# Patient Record
Sex: MALE | Race: ASIAN | NOT HISPANIC OR LATINO | Employment: UNEMPLOYED
[De-identification: names, ages, dates, MRNs, and addresses within clinical notes are randomized per-mention and may not be internally consistent; named-entity substitution may affect disease eponyms.]

---

## 2017-08-29 ENCOUNTER — RECORDS - HEALTHEAST (OUTPATIENT)
Dept: ADMINISTRATIVE | Facility: OTHER | Age: 37
End: 2017-08-29

## 2017-09-06 ENCOUNTER — RECORDS - HEALTHEAST (OUTPATIENT)
Dept: ADMINISTRATIVE | Facility: OTHER | Age: 37
End: 2017-09-06

## 2018-03-05 ENCOUNTER — OFFICE VISIT - HEALTHEAST (OUTPATIENT)
Dept: FAMILY MEDICINE | Facility: CLINIC | Age: 38
End: 2018-03-05

## 2018-03-05 DIAGNOSIS — N52.9 ERECTILE DYSFUNCTION: ICD-10-CM

## 2018-03-05 DIAGNOSIS — N41.9 PROSTATITIS: ICD-10-CM

## 2018-03-05 DIAGNOSIS — E66.9 OBESITY: ICD-10-CM

## 2018-03-05 DIAGNOSIS — L83 ACANTHOSIS NIGRICANS: ICD-10-CM

## 2018-03-05 DIAGNOSIS — L21.0 SEBORRHEA CAPITIS: ICD-10-CM

## 2018-03-05 DIAGNOSIS — G62.9 NEUROPATHY: ICD-10-CM

## 2018-03-05 DIAGNOSIS — R74.8 ABNORMAL LIVER ENZYMES: ICD-10-CM

## 2018-03-05 LAB
ALBUMIN SERPL-MCNC: 4.5 G/DL (ref 3.5–5)
ALP SERPL-CCNC: 79 U/L (ref 45–120)
ALT SERPL W P-5'-P-CCNC: 100 U/L (ref 0–45)
ANION GAP SERPL CALCULATED.3IONS-SCNC: 12 MMOL/L (ref 5–18)
AST SERPL W P-5'-P-CCNC: 67 U/L (ref 0–40)
BILIRUB SERPL-MCNC: 0.8 MG/DL (ref 0–1)
BUN SERPL-MCNC: 15 MG/DL (ref 8–22)
CALCIUM SERPL-MCNC: 9.9 MG/DL (ref 8.5–10.5)
CHLORIDE BLD-SCNC: 103 MMOL/L (ref 98–107)
CO2 SERPL-SCNC: 26 MMOL/L (ref 22–31)
CREAT SERPL-MCNC: 0.93 MG/DL (ref 0.7–1.3)
ERYTHROCYTE [DISTWIDTH] IN BLOOD BY AUTOMATED COUNT: 11.8 % (ref 11–14.5)
GFR SERPL CREATININE-BSD FRML MDRD: >60 ML/MIN/1.73M2
GLUCOSE BLD-MCNC: 71 MG/DL (ref 70–125)
HCT VFR BLD AUTO: 52.5 % (ref 40–54)
HGB BLD-MCNC: 17 G/DL (ref 14–18)
LDLC SERPL CALC-MCNC: 196 MG/DL
MCH RBC QN AUTO: 28.3 PG (ref 27–34)
MCHC RBC AUTO-ENTMCNC: 32.4 G/DL (ref 32–36)
MCV RBC AUTO: 88 FL (ref 80–100)
PLATELET # BLD AUTO: 314 THOU/UL (ref 140–440)
PMV BLD AUTO: 7 FL (ref 7–10)
POTASSIUM BLD-SCNC: 4.4 MMOL/L (ref 3.5–5)
PROT SERPL-MCNC: 8.5 G/DL (ref 6–8)
RBC # BLD AUTO: 5.99 MILL/UL (ref 4.4–6.2)
SODIUM SERPL-SCNC: 141 MMOL/L (ref 136–145)
TSH SERPL DL<=0.005 MIU/L-ACNC: 3.11 UIU/ML (ref 0.3–5)
WBC: 9.9 THOU/UL (ref 4–11)

## 2018-03-05 ASSESSMENT — MIFFLIN-ST. JEOR: SCORE: 1724.59

## 2018-03-07 ENCOUNTER — COMMUNICATION - HEALTHEAST (OUTPATIENT)
Dept: FAMILY MEDICINE | Facility: CLINIC | Age: 38
End: 2018-03-07

## 2018-03-07 LAB
HAV IGM SERPL QL IA: NEGATIVE
HBV CORE IGM SERPL QL IA: NEGATIVE
HBV SURFACE AG SERPL QL IA: NEGATIVE
HCV AB SERPL QL IA: NEGATIVE

## 2018-05-11 ENCOUNTER — OFFICE VISIT - HEALTHEAST (OUTPATIENT)
Dept: FAMILY MEDICINE | Facility: CLINIC | Age: 38
End: 2018-05-11

## 2018-05-11 DIAGNOSIS — L21.0 SEBORRHEA CAPITIS: ICD-10-CM

## 2018-05-11 DIAGNOSIS — E78.5 HYPERLIPIDEMIA: ICD-10-CM

## 2018-05-11 DIAGNOSIS — M77.9 TENDONITIS: ICD-10-CM

## 2018-05-11 DIAGNOSIS — E66.9 OBESITY: ICD-10-CM

## 2018-05-11 DIAGNOSIS — R74.8 ABNORMAL LIVER ENZYMES: ICD-10-CM

## 2018-05-11 LAB
ALBUMIN SERPL-MCNC: 4.1 G/DL (ref 3.5–5)
ALP SERPL-CCNC: 85 U/L (ref 45–120)
ALT SERPL W P-5'-P-CCNC: 86 U/L (ref 0–45)
ANION GAP SERPL CALCULATED.3IONS-SCNC: 9 MMOL/L (ref 5–18)
AST SERPL W P-5'-P-CCNC: 50 U/L (ref 0–40)
BILIRUB SERPL-MCNC: 0.7 MG/DL (ref 0–1)
BUN SERPL-MCNC: 17 MG/DL (ref 8–22)
CALCIUM SERPL-MCNC: 9.8 MG/DL (ref 8.5–10.5)
CHLORIDE BLD-SCNC: 105 MMOL/L (ref 98–107)
CO2 SERPL-SCNC: 25 MMOL/L (ref 22–31)
CREAT SERPL-MCNC: 0.86 MG/DL (ref 0.7–1.3)
GFR SERPL CREATININE-BSD FRML MDRD: >60 ML/MIN/1.73M2
GLUCOSE BLD-MCNC: 72 MG/DL (ref 70–125)
LDLC SERPL CALC-MCNC: 170 MG/DL
POTASSIUM BLD-SCNC: 4.4 MMOL/L (ref 3.5–5)
PROT SERPL-MCNC: 7.7 G/DL (ref 6–8)
SODIUM SERPL-SCNC: 139 MMOL/L (ref 136–145)

## 2018-05-14 ENCOUNTER — COMMUNICATION - HEALTHEAST (OUTPATIENT)
Dept: FAMILY MEDICINE | Facility: CLINIC | Age: 38
End: 2018-05-14

## 2018-06-04 ENCOUNTER — OFFICE VISIT - HEALTHEAST (OUTPATIENT)
Dept: FAMILY MEDICINE | Facility: CLINIC | Age: 38
End: 2018-06-04

## 2018-06-04 DIAGNOSIS — J02.0 STREP PHARYNGITIS: ICD-10-CM

## 2018-06-04 DIAGNOSIS — L21.0 SEBORRHEA CAPITIS: ICD-10-CM

## 2018-06-04 DIAGNOSIS — Z20.818 EXPOSURE TO STREP THROAT: ICD-10-CM

## 2018-06-04 LAB — DEPRECATED S PYO AG THROAT QL EIA: ABNORMAL

## 2018-07-08 ENCOUNTER — OFFICE VISIT - HEALTHEAST (OUTPATIENT)
Dept: FAMILY MEDICINE | Facility: CLINIC | Age: 38
End: 2018-07-08

## 2018-07-08 DIAGNOSIS — S89.92XA INJURY, LOWER LEG, LEFT, INITIAL ENCOUNTER: ICD-10-CM

## 2018-07-08 DIAGNOSIS — M79.605 BILATERAL LEG PAIN: ICD-10-CM

## 2018-07-08 DIAGNOSIS — S89.91XA INJURY, LOWER LEG, RIGHT, INITIAL ENCOUNTER: ICD-10-CM

## 2018-07-08 DIAGNOSIS — M79.604 BILATERAL LEG PAIN: ICD-10-CM

## 2018-07-08 DIAGNOSIS — S86.899A SHIN SPLINTS, INITIAL ENCOUNTER: ICD-10-CM

## 2018-09-30 ENCOUNTER — OFFICE VISIT - HEALTHEAST (OUTPATIENT)
Dept: FAMILY MEDICINE | Facility: CLINIC | Age: 38
End: 2018-09-30

## 2018-09-30 DIAGNOSIS — J02.0 ACUTE STREPTOCOCCAL PHARYNGITIS: ICD-10-CM

## 2018-09-30 DIAGNOSIS — R07.0 THROAT PAIN: ICD-10-CM

## 2018-09-30 DIAGNOSIS — J32.9 SINUSITIS: ICD-10-CM

## 2018-09-30 DIAGNOSIS — H66.93 BILATERAL OTITIS MEDIA: ICD-10-CM

## 2018-09-30 LAB — DEPRECATED S PYO AG THROAT QL EIA: ABNORMAL

## 2019-10-24 ENCOUNTER — AMBULATORY - HEALTHEAST (OUTPATIENT)
Dept: NURSING | Facility: CLINIC | Age: 39
End: 2019-10-24

## 2019-10-31 ENCOUNTER — COMMUNICATION - HEALTHEAST (OUTPATIENT)
Dept: PHARMACY | Facility: HOSPITAL | Age: 39
End: 2019-10-31

## 2019-10-31 ENCOUNTER — OFFICE VISIT - HEALTHEAST (OUTPATIENT)
Dept: FAMILY MEDICINE | Facility: CLINIC | Age: 39
End: 2019-10-31

## 2019-10-31 DIAGNOSIS — R10.13 ABDOMINAL PAIN, EPIGASTRIC: ICD-10-CM

## 2019-10-31 DIAGNOSIS — R10.11 RUQ ABDOMINAL PAIN: ICD-10-CM

## 2019-10-31 DIAGNOSIS — K76.0 HEPATIC STEATOSIS: ICD-10-CM

## 2020-10-03 ENCOUNTER — OFFICE VISIT - HEALTHEAST (OUTPATIENT)
Dept: FAMILY MEDICINE | Facility: CLINIC | Age: 40
End: 2020-10-03

## 2020-10-03 DIAGNOSIS — R10.13 ABDOMINAL PAIN, EPIGASTRIC: ICD-10-CM

## 2020-10-03 DIAGNOSIS — Z13.1 SCREENING FOR DIABETES MELLITUS: ICD-10-CM

## 2020-10-03 DIAGNOSIS — K59.00 CONSTIPATION, UNSPECIFIED CONSTIPATION TYPE: ICD-10-CM

## 2020-10-03 DIAGNOSIS — Z13.220 LIPID SCREENING: ICD-10-CM

## 2020-10-03 LAB
CHOLEST SERPL-MCNC: 262 MG/DL
FASTING STATUS PATIENT QL REPORTED: NO
HBA1C MFR BLD: 7.3 %
HDLC SERPL-MCNC: 41 MG/DL
LDLC SERPL CALC-MCNC: 196 MG/DL
TRIGL SERPL-MCNC: 127 MG/DL

## 2020-10-04 ENCOUNTER — COMMUNICATION - HEALTHEAST (OUTPATIENT)
Dept: FAMILY MEDICINE | Facility: CLINIC | Age: 40
End: 2020-10-04

## 2020-10-05 ENCOUNTER — COMMUNICATION - HEALTHEAST (OUTPATIENT)
Dept: FAMILY MEDICINE | Facility: CLINIC | Age: 40
End: 2020-10-05

## 2020-10-07 ENCOUNTER — OFFICE VISIT - HEALTHEAST (OUTPATIENT)
Dept: FAMILY MEDICINE | Facility: CLINIC | Age: 40
End: 2020-10-07

## 2020-10-07 ENCOUNTER — AMBULATORY - HEALTHEAST (OUTPATIENT)
Dept: LAB | Facility: CLINIC | Age: 40
End: 2020-10-07

## 2020-10-07 DIAGNOSIS — R11.0 NAUSEA: ICD-10-CM

## 2020-10-07 DIAGNOSIS — R10.31 ABDOMINAL PAIN, RIGHT LOWER QUADRANT: ICD-10-CM

## 2020-10-07 DIAGNOSIS — R10.13 ABDOMINAL PAIN, EPIGASTRIC: ICD-10-CM

## 2020-10-07 DIAGNOSIS — A04.8 H. PYLORI INFECTION: ICD-10-CM

## 2020-10-07 DIAGNOSIS — R10.32 ABDOMINAL PAIN, LEFT LOWER QUADRANT: ICD-10-CM

## 2020-10-07 LAB
ALBUMIN SERPL-MCNC: 4.9 G/DL (ref 3.5–5)
ALP SERPL-CCNC: 73 U/L (ref 45–120)
ALT SERPL W P-5'-P-CCNC: 124 U/L (ref 0–45)
AST SERPL W P-5'-P-CCNC: 65 U/L (ref 0–40)
BASOPHILS # BLD AUTO: 0 THOU/UL (ref 0–0.2)
BASOPHILS NFR BLD AUTO: 0 % (ref 0–2)
BILIRUB DIRECT SERPL-MCNC: 0.3 MG/DL
BILIRUB SERPL-MCNC: 1.1 MG/DL (ref 0–1)
EOSINOPHIL # BLD AUTO: 0 THOU/UL (ref 0–0.4)
EOSINOPHIL NFR BLD AUTO: 0 % (ref 0–6)
ERYTHROCYTE [DISTWIDTH] IN BLOOD BY AUTOMATED COUNT: 13.2 % (ref 11–14.5)
HCT VFR BLD AUTO: 50 % (ref 40–54)
HGB BLD-MCNC: 16.3 G/DL (ref 14–18)
IMM GRANULOCYTES # BLD: 0 THOU/UL
IMM GRANULOCYTES NFR BLD: 0 %
LIPASE SERPL-CCNC: 20 U/L (ref 0–52)
LYMPHOCYTES # BLD AUTO: 4.7 THOU/UL (ref 0.8–4.4)
LYMPHOCYTES NFR BLD AUTO: 48 % (ref 20–40)
MCH RBC QN AUTO: 28.2 PG (ref 27–34)
MCHC RBC AUTO-ENTMCNC: 32.6 G/DL (ref 32–36)
MCV RBC AUTO: 86 FL (ref 80–100)
MONOCYTES # BLD AUTO: 0.5 THOU/UL (ref 0–0.9)
MONOCYTES NFR BLD AUTO: 5 % (ref 2–10)
NEUTROPHILS # BLD AUTO: 4.5 THOU/UL (ref 2–7.7)
NEUTROPHILS NFR BLD AUTO: 46 % (ref 50–70)
PLATELET # BLD AUTO: 352 THOU/UL (ref 140–440)
PMV BLD AUTO: 9.6 FL (ref 8.5–12.5)
PROT SERPL-MCNC: 8.8 G/DL (ref 6–8)
RBC # BLD AUTO: 5.79 MILL/UL (ref 4.4–6.2)
WBC: 9.8 THOU/UL (ref 4–11)

## 2020-10-08 ENCOUNTER — OFFICE VISIT - HEALTHEAST (OUTPATIENT)
Dept: FAMILY MEDICINE | Facility: CLINIC | Age: 40
End: 2020-10-08

## 2020-10-08 DIAGNOSIS — E66.01 SEVERE OBESITY (BMI 35.0-35.9 WITH COMORBIDITY) (H): ICD-10-CM

## 2020-10-08 DIAGNOSIS — E78.5 HYPERLIPIDEMIA LDL GOAL <130: ICD-10-CM

## 2020-10-08 DIAGNOSIS — R10.30 LOWER ABDOMINAL PAIN: ICD-10-CM

## 2020-10-08 DIAGNOSIS — R07.89 ATYPICAL CHEST PAIN: ICD-10-CM

## 2020-10-08 DIAGNOSIS — R19.8 TENESMUS: ICD-10-CM

## 2020-10-08 DIAGNOSIS — E11.65 TYPE 2 DIABETES MELLITUS WITH HYPERGLYCEMIA, WITHOUT LONG-TERM CURRENT USE OF INSULIN (H): ICD-10-CM

## 2020-10-08 DIAGNOSIS — R74.01 ELEVATED ALT MEASUREMENT: ICD-10-CM

## 2020-10-08 DIAGNOSIS — R03.0 ELEVATED BLOOD PRESSURE READING WITHOUT DIAGNOSIS OF HYPERTENSION: ICD-10-CM

## 2020-10-08 DIAGNOSIS — K76.0 NONALCOHOLIC HEPATOSTEATOSIS: ICD-10-CM

## 2020-10-08 RX ORDER — GLUCOSAMINE HCL/CHONDROITIN SU 500-400 MG
1 CAPSULE ORAL DAILY
Qty: 100 STRIP | Refills: 3 | Status: SHIPPED | OUTPATIENT
Start: 2020-10-08 | End: 2023-05-01

## 2020-10-08 ASSESSMENT — PATIENT HEALTH QUESTIONNAIRE - PHQ9: SUM OF ALL RESPONSES TO PHQ QUESTIONS 1-9: 0

## 2020-10-09 ENCOUNTER — COMMUNICATION - HEALTHEAST (OUTPATIENT)
Dept: FAMILY MEDICINE | Facility: CLINIC | Age: 40
End: 2020-10-09

## 2020-10-09 DIAGNOSIS — A04.8 HELICOBACTER PYLORI STOOL TEST POSITIVE: ICD-10-CM

## 2020-10-09 LAB — H PYLORI AG STL QL IA: POSITIVE

## 2020-10-11 ENCOUNTER — AMBULATORY - HEALTHEAST (OUTPATIENT)
Dept: FAMILY MEDICINE | Facility: CLINIC | Age: 40
End: 2020-10-11

## 2020-10-11 DIAGNOSIS — R07.89 ATYPICAL CHEST PAIN: ICD-10-CM

## 2020-10-11 DIAGNOSIS — R10.30 LOWER ABDOMINAL PAIN: ICD-10-CM

## 2020-10-13 ENCOUNTER — COMMUNICATION - HEALTHEAST (OUTPATIENT)
Dept: SCHEDULING | Facility: CLINIC | Age: 40
End: 2020-10-13

## 2020-10-14 ENCOUNTER — COMMUNICATION - HEALTHEAST (OUTPATIENT)
Dept: FAMILY MEDICINE | Facility: CLINIC | Age: 40
End: 2020-10-14

## 2020-10-14 ENCOUNTER — COMMUNICATION - HEALTHEAST (OUTPATIENT)
Dept: CARDIOLOGY | Facility: CLINIC | Age: 40
End: 2020-10-14

## 2020-10-14 ENCOUNTER — OFFICE VISIT - HEALTHEAST (OUTPATIENT)
Dept: FAMILY MEDICINE | Facility: CLINIC | Age: 40
End: 2020-10-14

## 2020-10-14 DIAGNOSIS — R07.9 ACUTE CHEST PAIN: ICD-10-CM

## 2020-10-14 LAB
ATRIAL RATE - MUSE: 78 BPM
DIASTOLIC BLOOD PRESSURE - MUSE: NORMAL
INTERPRETATION ECG - MUSE: NORMAL
P AXIS - MUSE: 18 DEGREES
PR INTERVAL - MUSE: 170 MS
QRS DURATION - MUSE: 104 MS
QT - MUSE: 390 MS
QTC - MUSE: 444 MS
R AXIS - MUSE: 60 DEGREES
SYSTOLIC BLOOD PRESSURE - MUSE: NORMAL
T AXIS - MUSE: 4 DEGREES
VENTRICULAR RATE- MUSE: 78 BPM

## 2020-10-15 ENCOUNTER — OFFICE VISIT - HEALTHEAST (OUTPATIENT)
Dept: CARDIOLOGY | Facility: CLINIC | Age: 40
End: 2020-10-15

## 2020-10-15 DIAGNOSIS — R07.9 CHEST PAIN: ICD-10-CM

## 2020-10-15 DIAGNOSIS — E78.5 DYSLIPIDEMIA, GOAL LDL BELOW 70: ICD-10-CM

## 2020-10-15 ASSESSMENT — MIFFLIN-ST. JEOR: SCORE: 1749.08

## 2020-10-18 ENCOUNTER — COMMUNICATION - HEALTHEAST (OUTPATIENT)
Dept: FAMILY MEDICINE | Facility: CLINIC | Age: 40
End: 2020-10-18

## 2020-10-20 ENCOUNTER — COMMUNICATION - HEALTHEAST (OUTPATIENT)
Dept: SCHEDULING | Facility: CLINIC | Age: 40
End: 2020-10-20

## 2020-10-20 ENCOUNTER — OFFICE VISIT - HEALTHEAST (OUTPATIENT)
Dept: FAMILY MEDICINE | Facility: CLINIC | Age: 40
End: 2020-10-20

## 2020-10-20 DIAGNOSIS — R53.83 FATIGUE, UNSPECIFIED TYPE: ICD-10-CM

## 2020-10-20 DIAGNOSIS — J02.9 PHARYNGITIS, UNSPECIFIED ETIOLOGY: ICD-10-CM

## 2020-10-21 ENCOUNTER — AMBULATORY - HEALTHEAST (OUTPATIENT)
Dept: FAMILY MEDICINE | Facility: CLINIC | Age: 40
End: 2020-10-21

## 2020-10-22 ENCOUNTER — OFFICE VISIT - HEALTHEAST (OUTPATIENT)
Dept: FAMILY MEDICINE | Facility: CLINIC | Age: 40
End: 2020-10-22

## 2020-10-22 DIAGNOSIS — R10.9 BILATERAL FLANK PAIN: ICD-10-CM

## 2020-10-22 DIAGNOSIS — R07.0 THROAT PAIN: ICD-10-CM

## 2020-10-22 LAB
ALBUMIN SERPL-MCNC: 5 G/DL (ref 3.5–5)
ALBUMIN UR-MCNC: ABNORMAL MG/DL
ALP SERPL-CCNC: 73 U/L (ref 45–120)
ALT SERPL W P-5'-P-CCNC: 95 U/L (ref 0–45)
ANION GAP SERPL CALCULATED.3IONS-SCNC: 15 MMOL/L (ref 5–18)
APPEARANCE UR: CLEAR
AST SERPL W P-5'-P-CCNC: 52 U/L (ref 0–40)
BACTERIA #/AREA URNS HPF: ABNORMAL HPF
BASOPHILS # BLD AUTO: 0 THOU/UL (ref 0–0.2)
BASOPHILS NFR BLD AUTO: 1 % (ref 0–2)
BILIRUB SERPL-MCNC: 1.1 MG/DL (ref 0–1)
BILIRUB UR QL STRIP: ABNORMAL
BUN SERPL-MCNC: 13 MG/DL (ref 8–22)
CALCIUM SERPL-MCNC: 9.9 MG/DL (ref 8.5–10.5)
CHLORIDE BLD-SCNC: 102 MMOL/L (ref 98–107)
CO2 SERPL-SCNC: 22 MMOL/L (ref 22–31)
COLOR UR AUTO: YELLOW
CREAT SERPL-MCNC: 1.26 MG/DL (ref 0.7–1.3)
EOSINOPHIL # BLD AUTO: 0.1 THOU/UL (ref 0–0.4)
EOSINOPHIL NFR BLD AUTO: 1 % (ref 0–6)
ERYTHROCYTE [DISTWIDTH] IN BLOOD BY AUTOMATED COUNT: 12.8 % (ref 11–14.5)
GFR SERPL CREATININE-BSD FRML MDRD: >60 ML/MIN/1.73M2
GLUCOSE BLD-MCNC: 85 MG/DL (ref 70–125)
GLUCOSE UR STRIP-MCNC: NEGATIVE MG/DL
HCT VFR BLD AUTO: 49.3 % (ref 40–54)
HGB BLD-MCNC: 16.2 G/DL (ref 14–18)
HGB UR QL STRIP: NEGATIVE
IMM GRANULOCYTES # BLD: 0 THOU/UL
IMM GRANULOCYTES NFR BLD: 0 %
KETONES UR STRIP-MCNC: ABNORMAL MG/DL
LEUKOCYTE ESTERASE UR QL STRIP: NEGATIVE
LYMPHOCYTES # BLD AUTO: 3.7 THOU/UL (ref 0.8–4.4)
LYMPHOCYTES NFR BLD AUTO: 46 % (ref 20–40)
MCH RBC QN AUTO: 28.2 PG (ref 27–34)
MCHC RBC AUTO-ENTMCNC: 32.9 G/DL (ref 32–36)
MCV RBC AUTO: 86 FL (ref 80–100)
MONOCYTES # BLD AUTO: 0.5 THOU/UL (ref 0–0.9)
MONOCYTES NFR BLD AUTO: 7 % (ref 2–10)
MUCOUS THREADS #/AREA URNS LPF: ABNORMAL LPF
NEUTROPHILS # BLD AUTO: 3.6 THOU/UL (ref 2–7.7)
NEUTROPHILS NFR BLD AUTO: 45 % (ref 50–70)
NITRATE UR QL: NEGATIVE
PH UR STRIP: 5.5 [PH] (ref 5–8)
PLATELET # BLD AUTO: 345 THOU/UL (ref 140–440)
PMV BLD AUTO: 9.6 FL (ref 8.5–12.5)
POTASSIUM BLD-SCNC: 4.4 MMOL/L (ref 3.5–5)
PROT SERPL-MCNC: 8.7 G/DL (ref 6–8)
RBC # BLD AUTO: 5.75 MILL/UL (ref 4.4–6.2)
RBC #/AREA URNS AUTO: ABNORMAL HPF
SODIUM SERPL-SCNC: 139 MMOL/L (ref 136–145)
SP GR UR STRIP: 1.02 (ref 1–1.03)
SQUAMOUS #/AREA URNS AUTO: ABNORMAL LPF
TSH SERPL DL<=0.005 MIU/L-ACNC: 2.37 UIU/ML (ref 0.3–5)
UROBILINOGEN UR STRIP-ACNC: ABNORMAL
WBC #/AREA URNS AUTO: ABNORMAL HPF
WBC: 8 THOU/UL (ref 4–11)

## 2020-10-23 ENCOUNTER — COMMUNICATION - HEALTHEAST (OUTPATIENT)
Dept: SCHEDULING | Facility: CLINIC | Age: 40
End: 2020-10-23

## 2020-10-26 ENCOUNTER — OFFICE VISIT - HEALTHEAST (OUTPATIENT)
Dept: FAMILY MEDICINE | Facility: CLINIC | Age: 40
End: 2020-10-26

## 2020-10-26 DIAGNOSIS — Z23 IMMUNIZATION DUE: ICD-10-CM

## 2020-10-26 DIAGNOSIS — E11.9 TYPE 2 DIABETES MELLITUS WITHOUT COMPLICATION, WITHOUT LONG-TERM CURRENT USE OF INSULIN (H): ICD-10-CM

## 2020-10-26 DIAGNOSIS — E78.5 DYSLIPIDEMIA, GOAL LDL BELOW 70: ICD-10-CM

## 2020-10-26 DIAGNOSIS — A04.8 HELICOBACTER PYLORI STOOL TEST POSITIVE: ICD-10-CM

## 2020-10-26 DIAGNOSIS — K76.0 NONALCOHOLIC HEPATOSTEATOSIS: ICD-10-CM

## 2020-10-26 DIAGNOSIS — R10.9 FLANK PAIN: ICD-10-CM

## 2020-10-26 LAB
ALBUMIN SERPL-MCNC: 4.9 G/DL (ref 3.5–5)
ALBUMIN UR-MCNC: ABNORMAL MG/DL
ALP SERPL-CCNC: 76 U/L (ref 45–120)
ALT SERPL W P-5'-P-CCNC: 76 U/L (ref 0–45)
ANION GAP SERPL CALCULATED.3IONS-SCNC: 13 MMOL/L (ref 5–18)
APPEARANCE UR: CLEAR
AST SERPL W P-5'-P-CCNC: 41 U/L (ref 0–40)
BACTERIA #/AREA URNS HPF: ABNORMAL HPF
BILIRUB SERPL-MCNC: 0.8 MG/DL (ref 0–1)
BILIRUB UR QL STRIP: ABNORMAL
BUN SERPL-MCNC: 15 MG/DL (ref 8–22)
CALCIUM SERPL-MCNC: 10 MG/DL (ref 8.5–10.5)
CHLORIDE BLD-SCNC: 102 MMOL/L (ref 98–107)
CHOLEST SERPL-MCNC: 220 MG/DL
CO2 SERPL-SCNC: 23 MMOL/L (ref 22–31)
COLOR UR AUTO: YELLOW
CREAT SERPL-MCNC: 1.03 MG/DL (ref 0.7–1.3)
CREAT UR-MCNC: 384 MG/DL
FASTING STATUS PATIENT QL REPORTED: YES
GFR SERPL CREATININE-BSD FRML MDRD: >60 ML/MIN/1.73M2
GLUCOSE BLD-MCNC: 84 MG/DL (ref 70–125)
GLUCOSE UR STRIP-MCNC: NEGATIVE MG/DL
HBA1C MFR BLD: 6.7 %
HDLC SERPL-MCNC: 29 MG/DL
HGB UR QL STRIP: ABNORMAL
KETONES UR STRIP-MCNC: ABNORMAL MG/DL
LDLC SERPL CALC-MCNC: 176 MG/DL
LEUKOCYTE ESTERASE UR QL STRIP: NEGATIVE
MICROALBUMIN UR-MCNC: 2.31 MG/DL (ref 0–1.99)
MICROALBUMIN/CREAT UR: 6 MG/G
MUCOUS THREADS #/AREA URNS LPF: ABNORMAL LPF
NITRATE UR QL: NEGATIVE
PH UR STRIP: 6 [PH] (ref 5–8)
POTASSIUM BLD-SCNC: 4.4 MMOL/L (ref 3.5–5)
PROT SERPL-MCNC: 8.2 G/DL (ref 6–8)
RBC #/AREA URNS AUTO: ABNORMAL HPF
SODIUM SERPL-SCNC: 138 MMOL/L (ref 136–145)
SP GR UR STRIP: 1.02 (ref 1–1.03)
SQUAMOUS #/AREA URNS AUTO: ABNORMAL LPF
TRIGL SERPL-MCNC: 75 MG/DL
UROBILINOGEN UR STRIP-ACNC: ABNORMAL
WBC #/AREA URNS AUTO: ABNORMAL HPF

## 2020-10-27 ENCOUNTER — COMMUNICATION - HEALTHEAST (OUTPATIENT)
Dept: FAMILY MEDICINE | Facility: CLINIC | Age: 40
End: 2020-10-27

## 2020-10-27 ENCOUNTER — HOSPITAL ENCOUNTER (OUTPATIENT)
Dept: CARDIOLOGY | Facility: HOSPITAL | Age: 40
Discharge: HOME | End: 2020-10-27
Attending: INTERNAL MEDICINE

## 2020-10-27 ENCOUNTER — HOSPITAL ENCOUNTER (OUTPATIENT)
Dept: NUCLEAR MEDICINE | Facility: HOSPITAL | Age: 40
Discharge: HOME | End: 2020-10-27
Attending: INTERNAL MEDICINE

## 2020-10-27 DIAGNOSIS — R07.9 CHEST PAIN: ICD-10-CM

## 2020-10-27 LAB
BACTERIA SPEC CULT: NO GROWTH
CV STRESS CURRENT BP HE: NORMAL
CV STRESS CURRENT HR HE: 101
CV STRESS CURRENT HR HE: 107
CV STRESS CURRENT HR HE: 108
CV STRESS CURRENT HR HE: 109
CV STRESS CURRENT HR HE: 110
CV STRESS CURRENT HR HE: 110
CV STRESS CURRENT HR HE: 111
CV STRESS CURRENT HR HE: 113
CV STRESS CURRENT HR HE: 114
CV STRESS CURRENT HR HE: 118
CV STRESS CURRENT HR HE: 125
CV STRESS CURRENT HR HE: 126
CV STRESS CURRENT HR HE: 129
CV STRESS CURRENT HR HE: 134
CV STRESS CURRENT HR HE: 153
CV STRESS CURRENT HR HE: 155
CV STRESS CURRENT HR HE: 157
CV STRESS CURRENT HR HE: 158
CV STRESS CURRENT HR HE: 159
CV STRESS CURRENT HR HE: 159
CV STRESS CURRENT HR HE: 164
CV STRESS CURRENT HR HE: 167
CV STRESS CURRENT HR HE: 169
CV STRESS CURRENT HR HE: 74
CV STRESS CURRENT HR HE: 81
CV STRESS CURRENT HR HE: 98
CV STRESS DEVIATION TIME HE: NORMAL
CV STRESS ECHO PERCENT HR HE: NORMAL
CV STRESS EXERCISE STAGE HE: NORMAL
CV STRESS EXERCISE STAGE REACHED HE: NORMAL
CV STRESS FINAL RESTING BP HE: NORMAL
CV STRESS FINAL RESTING HR HE: 111
CV STRESS MAX HR HE: 169
CV STRESS MAX TREADMILL GRADE HE: 14
CV STRESS MAX TREADMILL SPEED HE: 3.4
CV STRESS PEAK DIA BP HE: NORMAL
CV STRESS PEAK SYS BP HE: NORMAL
CV STRESS PHASE HE: NORMAL
CV STRESS PROTOCOL HE: NORMAL
CV STRESS RESTING PT POSITION HE: NORMAL
CV STRESS RESTING PT POSITION HE: NORMAL
CV STRESS ST DEVIATION AMOUNT HE: NORMAL
CV STRESS ST DEVIATION ELEVATION HE: NORMAL
CV STRESS ST EVELATION AMOUNT HE: NORMAL
CV STRESS TEST TYPE HE: NORMAL
CV STRESS TOTAL STAGE TIME MIN 1 HE: NORMAL
HBV SURFACE AG SERPL QL IA: NEGATIVE
HEPATITIS B SURFACE ANTIBODY LHE- HISTORICAL: POSITIVE
NUC STRESS EJECTION FRACTION: 68 %
RATE PRESSURE PRODUCT: NORMAL
STRESS ECHO BASELINE DIASTOLIC HE: 64
STRESS ECHO BASELINE HR: 71
STRESS ECHO BASELINE SYSTOLIC BP: 104
STRESS ECHO CALCULATED PERCENT HR: 92 %
STRESS ECHO LAST STRESS DIASTOLIC BP: 84
STRESS ECHO LAST STRESS HR: 169
STRESS ECHO LAST STRESS SYSTOLIC BP: 198
STRESS ECHO POST ESTIMATED WORKLOAD: 9.7
STRESS ECHO POST EXERCISE DUR MIN: 8
STRESS ECHO POST EXERCISE DUR SEC: 5
STRESS ECHO TARGET HR: 183

## 2020-10-28 ENCOUNTER — COMMUNICATION - HEALTHEAST (OUTPATIENT)
Dept: FAMILY MEDICINE | Facility: CLINIC | Age: 40
End: 2020-10-28

## 2020-11-03 ENCOUNTER — COMMUNICATION - HEALTHEAST (OUTPATIENT)
Dept: FAMILY MEDICINE | Facility: CLINIC | Age: 40
End: 2020-11-03

## 2020-11-04 ENCOUNTER — AMBULATORY - HEALTHEAST (OUTPATIENT)
Dept: EDUCATION SERVICES | Facility: CLINIC | Age: 40
End: 2020-11-04

## 2020-11-04 DIAGNOSIS — E11.9 TYPE 2 DIABETES MELLITUS WITHOUT COMPLICATION, WITHOUT LONG-TERM CURRENT USE OF INSULIN (H): ICD-10-CM

## 2020-11-10 ENCOUNTER — OFFICE VISIT - HEALTHEAST (OUTPATIENT)
Dept: FAMILY MEDICINE | Facility: CLINIC | Age: 40
End: 2020-11-10

## 2020-11-10 DIAGNOSIS — R10.84 ABDOMINAL PAIN, GENERALIZED: ICD-10-CM

## 2020-11-10 DIAGNOSIS — E66.01 MORBID OBESITY (H): ICD-10-CM

## 2020-11-10 DIAGNOSIS — Q79.0 BOCHDALEK HERNIA: ICD-10-CM

## 2020-11-10 DIAGNOSIS — M54.2 NECK PAIN: ICD-10-CM

## 2020-11-10 DIAGNOSIS — Z23 NEED FOR VACCINATION AGAINST HEPATITIS A: ICD-10-CM

## 2020-11-10 DIAGNOSIS — Z23 NEED FOR TD VACCINE: ICD-10-CM

## 2020-11-10 ASSESSMENT — MIFFLIN-ST. JEOR: SCORE: 1683.3

## 2020-11-18 ENCOUNTER — COMMUNICATION - HEALTHEAST (OUTPATIENT)
Dept: FAMILY MEDICINE | Facility: CLINIC | Age: 40
End: 2020-11-18

## 2020-11-18 DIAGNOSIS — R22.1 NECK SWELLING: ICD-10-CM

## 2020-11-21 ENCOUNTER — HOSPITAL ENCOUNTER (OUTPATIENT)
Dept: ULTRASOUND IMAGING | Facility: HOSPITAL | Age: 40
Discharge: HOME | End: 2020-11-21
Attending: FAMILY MEDICINE

## 2020-11-21 DIAGNOSIS — M54.2 NECK PAIN: ICD-10-CM

## 2020-11-21 DIAGNOSIS — R10.84 ABDOMINAL PAIN, GENERALIZED: ICD-10-CM

## 2020-11-24 ENCOUNTER — OFFICE VISIT - HEALTHEAST (OUTPATIENT)
Dept: OTOLARYNGOLOGY | Facility: CLINIC | Age: 40
End: 2020-11-24

## 2020-11-24 ENCOUNTER — AMBULATORY - HEALTHEAST (OUTPATIENT)
Dept: FAMILY MEDICINE | Facility: CLINIC | Age: 40
End: 2020-11-24

## 2020-11-24 DIAGNOSIS — R51.9 MORNING HEADACHE: ICD-10-CM

## 2020-11-24 DIAGNOSIS — Q79.0 BOCHDALEK HERNIA: ICD-10-CM

## 2020-11-24 DIAGNOSIS — R09.A2 GLOBUS SENSATION: ICD-10-CM

## 2020-11-24 DIAGNOSIS — R06.83 SNORING: ICD-10-CM

## 2020-11-24 DIAGNOSIS — R10.84 ABDOMINAL PAIN, GENERALIZED: ICD-10-CM

## 2020-11-24 DIAGNOSIS — M26.621 ARTHRALGIA OF RIGHT TEMPOROMANDIBULAR JOINT: ICD-10-CM

## 2020-11-24 DIAGNOSIS — R09.81 NASAL CONGESTION: ICD-10-CM

## 2020-11-24 DIAGNOSIS — K76.0 NONALCOHOLIC HEPATOSTEATOSIS: ICD-10-CM

## 2020-11-24 DIAGNOSIS — K21.9 GASTROESOPHAGEAL REFLUX DISEASE WITHOUT ESOPHAGITIS: ICD-10-CM

## 2020-11-24 RX ORDER — AZELASTINE 1 MG/ML
SPRAY, METERED NASAL
Qty: 30 ML | Refills: 12 | Status: SHIPPED | OUTPATIENT
Start: 2020-11-24

## 2020-11-30 ENCOUNTER — OFFICE VISIT - HEALTHEAST (OUTPATIENT)
Dept: SURGERY | Facility: CLINIC | Age: 40
End: 2020-11-30

## 2020-11-30 ENCOUNTER — SURGERY - HEALTHEAST (OUTPATIENT)
Dept: SURGERY | Facility: CLINIC | Age: 40
End: 2020-11-30

## 2020-11-30 DIAGNOSIS — K21.9 GASTROESOPHAGEAL REFLUX DISEASE WITHOUT ESOPHAGITIS: ICD-10-CM

## 2020-12-03 ENCOUNTER — OFFICE VISIT - HEALTHEAST (OUTPATIENT)
Dept: FAMILY MEDICINE | Facility: CLINIC | Age: 40
End: 2020-12-03

## 2020-12-03 DIAGNOSIS — N48.1 BALANITIS: ICD-10-CM

## 2020-12-03 RX ORDER — CLOTRIMAZOLE 1 %
CREAM (GRAM) TOPICAL 2 TIMES DAILY
Qty: 30 G | Refills: 0 | Status: SHIPPED | OUTPATIENT
Start: 2020-12-03 | End: 2023-05-01

## 2020-12-03 ASSESSMENT — MIFFLIN-ST. JEOR: SCORE: 1687.27

## 2020-12-04 ENCOUNTER — COMMUNICATION - HEALTHEAST (OUTPATIENT)
Dept: FAMILY MEDICINE | Facility: CLINIC | Age: 40
End: 2020-12-04

## 2020-12-04 ENCOUNTER — AMBULATORY - HEALTHEAST (OUTPATIENT)
Dept: EDUCATION SERVICES | Facility: CLINIC | Age: 40
End: 2020-12-04

## 2020-12-04 DIAGNOSIS — E11.9 TYPE 2 DIABETES MELLITUS WITHOUT COMPLICATION, WITHOUT LONG-TERM CURRENT USE OF INSULIN (H): ICD-10-CM

## 2020-12-08 ENCOUNTER — HOSPITAL ENCOUNTER (OUTPATIENT)
Dept: RADIOLOGY | Facility: HOSPITAL | Age: 40
Discharge: HOME | End: 2020-12-08
Attending: SURGERY

## 2020-12-08 DIAGNOSIS — K21.9 GASTROESOPHAGEAL REFLUX DISEASE WITHOUT ESOPHAGITIS: ICD-10-CM

## 2020-12-10 ENCOUNTER — COMMUNICATION - HEALTHEAST (OUTPATIENT)
Dept: SURGERY | Facility: CLINIC | Age: 40
End: 2020-12-10

## 2020-12-15 ENCOUNTER — COMMUNICATION - HEALTHEAST (OUTPATIENT)
Dept: SURGERY | Facility: CLINIC | Age: 40
End: 2020-12-15

## 2020-12-15 ENCOUNTER — COMMUNICATION - HEALTHEAST (OUTPATIENT)
Dept: SLEEP MEDICINE | Facility: CLINIC | Age: 40
End: 2020-12-15

## 2020-12-17 ENCOUNTER — OFFICE VISIT - HEALTHEAST (OUTPATIENT)
Dept: FAMILY MEDICINE | Facility: CLINIC | Age: 40
End: 2020-12-17

## 2020-12-17 DIAGNOSIS — K21.00 GASTROESOPHAGEAL REFLUX DISEASE WITH ESOPHAGITIS WITHOUT HEMORRHAGE: ICD-10-CM

## 2020-12-17 DIAGNOSIS — K21.9 GASTROESOPHAGEAL REFLUX DISEASE WITHOUT ESOPHAGITIS: ICD-10-CM

## 2020-12-17 DIAGNOSIS — R07.89 OTHER CHEST PAIN: ICD-10-CM

## 2020-12-17 DIAGNOSIS — R52 TINGLING PAIN: ICD-10-CM

## 2020-12-18 ENCOUNTER — AMBULATORY - HEALTHEAST (OUTPATIENT)
Dept: LAB | Facility: CLINIC | Age: 40
End: 2020-12-18

## 2020-12-18 DIAGNOSIS — R52 TINGLING PAIN: ICD-10-CM

## 2020-12-18 LAB
ALBUMIN SERPL-MCNC: 4.5 G/DL (ref 3.5–5)
ALP SERPL-CCNC: 88 U/L (ref 45–120)
ALT SERPL W P-5'-P-CCNC: 41 U/L (ref 0–45)
ANION GAP SERPL CALCULATED.3IONS-SCNC: 13 MMOL/L (ref 5–18)
AST SERPL W P-5'-P-CCNC: 28 U/L (ref 0–40)
BILIRUB SERPL-MCNC: 0.7 MG/DL (ref 0–1)
BUN SERPL-MCNC: 21 MG/DL (ref 8–22)
CALCIUM SERPL-MCNC: 9.5 MG/DL (ref 8.5–10.5)
CHLORIDE BLD-SCNC: 103 MMOL/L (ref 98–107)
CO2 SERPL-SCNC: 24 MMOL/L (ref 22–31)
CREAT SERPL-MCNC: 1.04 MG/DL (ref 0.7–1.3)
GFR SERPL CREATININE-BSD FRML MDRD: >60 ML/MIN/1.73M2
GLUCOSE BLD-MCNC: 78 MG/DL (ref 70–125)
MAGNESIUM SERPL-MCNC: 2.2 MG/DL (ref 1.8–2.6)
POTASSIUM BLD-SCNC: 4.2 MMOL/L (ref 3.5–5)
PROT SERPL-MCNC: 8.1 G/DL (ref 6–8)
SODIUM SERPL-SCNC: 140 MMOL/L (ref 136–145)
TSH SERPL DL<=0.005 MIU/L-ACNC: 2.94 UIU/ML (ref 0.3–5)

## 2020-12-21 ENCOUNTER — AMBULATORY - HEALTHEAST (OUTPATIENT)
Dept: FAMILY MEDICINE | Facility: CLINIC | Age: 40
End: 2020-12-21

## 2020-12-21 DIAGNOSIS — E55.9 VITAMIN D DEFICIENCY: ICD-10-CM

## 2020-12-21 LAB
25(OH)D3 SERPL-MCNC: 15.3 NG/ML (ref 30–80)
25(OH)D3 SERPL-MCNC: 15.3 NG/ML (ref 30–80)

## 2020-12-22 ENCOUNTER — COMMUNICATION - HEALTHEAST (OUTPATIENT)
Dept: FAMILY MEDICINE | Facility: CLINIC | Age: 40
End: 2020-12-22

## 2020-12-28 ENCOUNTER — COMMUNICATION - HEALTHEAST (OUTPATIENT)
Dept: SURGERY | Facility: CLINIC | Age: 40
End: 2020-12-28

## 2020-12-28 ENCOUNTER — SURGERY - HEALTHEAST (OUTPATIENT)
Dept: SURGERY | Facility: CLINIC | Age: 40
End: 2020-12-28

## 2020-12-28 ENCOUNTER — COMMUNICATION - HEALTHEAST (OUTPATIENT)
Dept: FAMILY MEDICINE | Facility: CLINIC | Age: 40
End: 2020-12-28

## 2020-12-28 ENCOUNTER — AMBULATORY - HEALTHEAST (OUTPATIENT)
Dept: SURGERY | Facility: AMBULATORY SURGERY CENTER | Age: 40
End: 2020-12-28

## 2020-12-28 DIAGNOSIS — Z11.59 ENCOUNTER FOR SCREENING FOR OTHER VIRAL DISEASES: ICD-10-CM

## 2020-12-28 DIAGNOSIS — K21.9 GASTROESOPHAGEAL REFLUX DISEASE WITHOUT ESOPHAGITIS: ICD-10-CM

## 2021-01-02 ENCOUNTER — AMBULATORY - HEALTHEAST (OUTPATIENT)
Dept: FAMILY MEDICINE | Facility: CLINIC | Age: 41
End: 2021-01-02

## 2021-01-02 DIAGNOSIS — Z11.59 ENCOUNTER FOR SCREENING FOR OTHER VIRAL DISEASES: ICD-10-CM

## 2021-01-04 ENCOUNTER — ANESTHESIA - HEALTHEAST (OUTPATIENT)
Dept: SURGERY | Facility: AMBULATORY SURGERY CENTER | Age: 41
End: 2021-01-04

## 2021-01-05 ENCOUNTER — SURGERY - HEALTHEAST (OUTPATIENT)
Dept: SURGERY | Facility: AMBULATORY SURGERY CENTER | Age: 41
End: 2021-01-05
Payer: COMMERCIAL

## 2021-02-09 ENCOUNTER — COMMUNICATION - HEALTHEAST (OUTPATIENT)
Dept: FAMILY MEDICINE | Facility: CLINIC | Age: 41
End: 2021-02-09

## 2021-02-09 DIAGNOSIS — K21.00 GASTROESOPHAGEAL REFLUX DISEASE WITH ESOPHAGITIS WITHOUT HEMORRHAGE: ICD-10-CM

## 2021-02-09 DIAGNOSIS — R22.1 NECK SWELLING: ICD-10-CM

## 2021-02-09 DIAGNOSIS — Z79.899 MEDICATION MANAGEMENT: ICD-10-CM

## 2021-02-09 DIAGNOSIS — K76.0 NONALCOHOLIC HEPATOSTEATOSIS: ICD-10-CM

## 2021-02-09 DIAGNOSIS — E11.9 TYPE 2 DIABETES MELLITUS WITHOUT COMPLICATION, WITHOUT LONG-TERM CURRENT USE OF INSULIN (H): ICD-10-CM

## 2021-02-09 DIAGNOSIS — E55.9 VITAMIN D DEFICIENCY: ICD-10-CM

## 2021-02-09 DIAGNOSIS — E78.5 DYSLIPIDEMIA, GOAL LDL BELOW 70: ICD-10-CM

## 2021-02-11 ENCOUNTER — AMBULATORY - HEALTHEAST (OUTPATIENT)
Dept: LAB | Facility: CLINIC | Age: 41
End: 2021-02-11

## 2021-02-11 ENCOUNTER — HOSPITAL ENCOUNTER (OUTPATIENT)
Dept: ULTRASOUND IMAGING | Facility: HOSPITAL | Age: 41
Discharge: HOME | End: 2021-02-11
Attending: FAMILY MEDICINE

## 2021-02-11 ENCOUNTER — COMMUNICATION - HEALTHEAST (OUTPATIENT)
Dept: FAMILY MEDICINE | Facility: CLINIC | Age: 41
End: 2021-02-11

## 2021-02-11 DIAGNOSIS — E78.5 DYSLIPIDEMIA, GOAL LDL BELOW 70: ICD-10-CM

## 2021-02-11 DIAGNOSIS — E11.9 TYPE 2 DIABETES MELLITUS WITHOUT COMPLICATION, WITHOUT LONG-TERM CURRENT USE OF INSULIN (H): ICD-10-CM

## 2021-02-11 DIAGNOSIS — Z79.899 MEDICATION MANAGEMENT: ICD-10-CM

## 2021-02-11 DIAGNOSIS — K21.00 GASTROESOPHAGEAL REFLUX DISEASE WITH ESOPHAGITIS WITHOUT HEMORRHAGE: ICD-10-CM

## 2021-02-11 DIAGNOSIS — R22.1 NECK SWELLING: ICD-10-CM

## 2021-02-11 DIAGNOSIS — E55.9 VITAMIN D DEFICIENCY: ICD-10-CM

## 2021-02-11 LAB
HBA1C MFR BLD: 5.3 %
IRON SATN MFR SERPL: 30 % (ref 20–50)
IRON SERPL-MCNC: 103 UG/DL (ref 42–175)
LDLC SERPL CALC-MCNC: 231 MG/DL
TIBC SERPL-MCNC: 345 UG/DL (ref 313–563)
TRANSFERRIN SERPL-MCNC: 276 MG/DL (ref 212–360)
TSH SERPL DL<=0.005 MIU/L-ACNC: 2.65 UIU/ML (ref 0.3–5)
VIT B12 SERPL-MCNC: 564 PG/ML (ref 213–816)

## 2021-02-12 LAB
25(OH)D3 SERPL-MCNC: 35.3 NG/ML (ref 30–80)
25(OH)D3 SERPL-MCNC: 35.3 NG/ML (ref 30–80)

## 2021-04-12 ENCOUNTER — COMMUNICATION - HEALTHEAST (OUTPATIENT)
Dept: FAMILY MEDICINE | Facility: CLINIC | Age: 41
End: 2021-04-12

## 2021-04-12 DIAGNOSIS — E55.9 VITAMIN D DEFICIENCY: ICD-10-CM

## 2021-04-15 ENCOUNTER — OFFICE VISIT - HEALTHEAST (OUTPATIENT)
Dept: FAMILY MEDICINE | Facility: CLINIC | Age: 41
End: 2021-04-15

## 2021-04-15 ENCOUNTER — AMBULATORY - HEALTHEAST (OUTPATIENT)
Dept: LAB | Facility: CLINIC | Age: 41
End: 2021-04-15

## 2021-04-15 ENCOUNTER — COMMUNICATION - HEALTHEAST (OUTPATIENT)
Dept: FAMILY MEDICINE | Facility: CLINIC | Age: 41
End: 2021-04-15

## 2021-04-15 DIAGNOSIS — E55.9 VITAMIN D DEFICIENCY: ICD-10-CM

## 2021-04-15 DIAGNOSIS — K21.9 GASTROESOPHAGEAL REFLUX DISEASE WITHOUT ESOPHAGITIS: ICD-10-CM

## 2021-04-15 DIAGNOSIS — M54.32 BILATERAL SCIATICA: ICD-10-CM

## 2021-04-15 DIAGNOSIS — E11.65 TYPE 2 DIABETES MELLITUS WITH HYPERGLYCEMIA, WITHOUT LONG-TERM CURRENT USE OF INSULIN (H): ICD-10-CM

## 2021-04-15 DIAGNOSIS — E78.5 DYSLIPIDEMIA, GOAL LDL BELOW 70: ICD-10-CM

## 2021-04-15 DIAGNOSIS — E66.01 MORBID OBESITY (H): ICD-10-CM

## 2021-04-15 DIAGNOSIS — N41.0 PROSTATITIS, ACUTE: ICD-10-CM

## 2021-04-15 DIAGNOSIS — M54.31 BILATERAL SCIATICA: ICD-10-CM

## 2021-04-15 LAB — LDLC SERPL CALC-MCNC: 187 MG/DL

## 2021-04-15 RX ORDER — OMEPRAZOLE 40 MG/1
40 CAPSULE, DELAYED RELEASE ORAL
Qty: 90 CAPSULE | Refills: 3 | Status: SHIPPED | OUTPATIENT
Start: 2021-04-15 | End: 2023-05-01

## 2021-04-16 LAB
25(OH)D3 SERPL-MCNC: 29 NG/ML (ref 30–80)
25(OH)D3 SERPL-MCNC: 29 NG/ML (ref 30–80)
BACTERIA SPEC CULT: NO GROWTH
C TRACH DNA SPEC QL PROBE+SIG AMP: NEGATIVE
N GONORRHOEA DNA SPEC QL NAA+PROBE: NEGATIVE

## 2021-04-16 RX ORDER — ROSUVASTATIN CALCIUM 40 MG/1
40 TABLET, COATED ORAL AT BEDTIME
Qty: 90 TABLET | Refills: 3 | Status: SHIPPED | OUTPATIENT
Start: 2021-04-16 | End: 2021-10-08

## 2021-04-16 RX ORDER — ERGOCALCIFEROL 1.25 MG/1
50000 CAPSULE ORAL
Qty: 3 CAPSULE | Refills: 4 | Status: SHIPPED | OUTPATIENT
Start: 2021-04-16 | End: 2023-05-01

## 2021-04-23 ENCOUNTER — COMMUNICATION - HEALTHEAST (OUTPATIENT)
Dept: FAMILY MEDICINE | Facility: CLINIC | Age: 41
End: 2021-04-23

## 2021-04-29 ENCOUNTER — OFFICE VISIT - HEALTHEAST (OUTPATIENT)
Dept: FAMILY MEDICINE | Facility: CLINIC | Age: 41
End: 2021-04-29

## 2021-04-29 DIAGNOSIS — N41.0 PROSTATITIS, ACUTE: ICD-10-CM

## 2021-04-29 DIAGNOSIS — K64.8 INTERNAL HEMORRHOIDS: ICD-10-CM

## 2021-04-29 RX ORDER — AMOXICILLIN 250 MG
2 CAPSULE ORAL DAILY
Qty: 50 TABLET | Refills: 0 | Status: SHIPPED | OUTPATIENT
Start: 2021-04-29 | End: 2023-05-01

## 2021-05-06 ENCOUNTER — RECORDS - HEALTHEAST (OUTPATIENT)
Dept: ADMINISTRATIVE | Facility: OTHER | Age: 41
End: 2021-05-06

## 2021-05-06 ENCOUNTER — OFFICE VISIT - HEALTHEAST (OUTPATIENT)
Dept: FAMILY MEDICINE | Facility: CLINIC | Age: 41
End: 2021-05-06

## 2021-05-06 DIAGNOSIS — R51.9 ACUTE INTRACTABLE HEADACHE, UNSPECIFIED HEADACHE TYPE: ICD-10-CM

## 2021-05-06 DIAGNOSIS — M62.838 CERVICAL PARASPINAL MUSCLE SPASM: ICD-10-CM

## 2021-05-06 DIAGNOSIS — L03.811 CELLULITIS OF HEAD EXCEPT FACE: ICD-10-CM

## 2021-05-06 RX ORDER — CYCLOBENZAPRINE HCL 5 MG
5 TABLET ORAL 3 TIMES DAILY PRN
Qty: 30 TABLET | Refills: 0 | Status: SHIPPED | OUTPATIENT
Start: 2021-05-06 | End: 2023-05-01

## 2021-05-13 ENCOUNTER — OFFICE VISIT - HEALTHEAST (OUTPATIENT)
Dept: FAMILY MEDICINE | Facility: CLINIC | Age: 41
End: 2021-05-13

## 2021-05-13 DIAGNOSIS — K76.0 NONALCOHOLIC HEPATOSTEATOSIS: ICD-10-CM

## 2021-05-13 DIAGNOSIS — E55.9 VITAMIN D DEFICIENCY: ICD-10-CM

## 2021-05-13 DIAGNOSIS — E78.5 DYSLIPIDEMIA, GOAL LDL BELOW 70: ICD-10-CM

## 2021-05-13 DIAGNOSIS — E11.65 TYPE 2 DIABETES MELLITUS WITH HYPERGLYCEMIA, WITHOUT LONG-TERM CURRENT USE OF INSULIN (H): ICD-10-CM

## 2021-05-13 DIAGNOSIS — R19.5 ABNORMAL STOOL CALIBER: ICD-10-CM

## 2021-05-27 VITALS
OXYGEN SATURATION: 98 % | SYSTOLIC BLOOD PRESSURE: 125 MMHG | RESPIRATION RATE: 16 BRPM | TEMPERATURE: 98 F | HEART RATE: 71 BPM | DIASTOLIC BLOOD PRESSURE: 78 MMHG

## 2021-06-01 VITALS — WEIGHT: 210 LBS | BODY MASS INDEX: 38.41 KG/M2

## 2021-06-01 VITALS — WEIGHT: 205.7 LBS | BODY MASS INDEX: 37.62 KG/M2

## 2021-06-01 VITALS — WEIGHT: 204 LBS | HEIGHT: 62 IN | BODY MASS INDEX: 37.54 KG/M2

## 2021-06-01 VITALS — WEIGHT: 207 LBS | BODY MASS INDEX: 37.86 KG/M2

## 2021-06-02 VITALS — WEIGHT: 208.5 LBS | BODY MASS INDEX: 38.14 KG/M2

## 2021-06-02 NOTE — PATIENT INSTRUCTIONS - HE
Go to the Emergency Department if your symptoms worsen.      Follow up with your primary care provider in a week for recheck.

## 2021-06-02 NOTE — PROGRESS NOTES
Chief Complaint   Patient presents with     Abdominal Pain     started yesterday, started at the chest and travel down to stomach and pain is now on middle Rt side of abdomen, coughing, SOB comes and goes           HPI      Patient is here for having moderate constant epigastric pain described as somewhat burning. The pain started for mid lower chest that came down to epigastric and radiates to right upper quadrant. The worst pain is at the epigastric area. Pain worsens with coughing. This AM he started a mild cough. He has hx of bad heartburn and only takes herbal supplements to help. No nausea, vomiting, chest pain (no chest pain today), diarrhea. No hx of abdominal surgeries.     ROS: Pertinent ROS noted in HPI.     No Known Allergies    There is no problem list on file for this patient.    No family history on file.    Social History     Socioeconomic History     Marital status:      Spouse name: Not on file     Number of children: Not on file     Years of education: Not on file     Highest education level: Not on file   Occupational History     Not on file   Social Needs     Financial resource strain: Not on file     Food insecurity:     Worry: Not on file     Inability: Not on file     Transportation needs:     Medical: Not on file     Non-medical: Not on file   Tobacco Use     Smoking status: Former Smoker     Smokeless tobacco: Never Used   Substance and Sexual Activity     Alcohol use: Not on file     Drug use: Not on file     Sexual activity: Not on file   Lifestyle     Physical activity:     Days per week: Not on file     Minutes per session: Not on file     Stress: Not on file   Relationships     Social connections:     Talks on phone: Not on file     Gets together: Not on file     Attends [employer] service: Not on file     Active member of club or organization: Not on file     Attends meetings of clubs or organizations: Not on file     Relationship status: Not on file     Intimate partner  violence:     Fear of current or ex partner: Not on file     Emotionally abused: Not on file     Physically abused: Not on file     Forced sexual activity: Not on file   Other Topics Concern     Not on file   Social History Narrative     Not on file         Objective:    Vitals:    10/31/19 0734   BP: 119/65   Pulse: 83   Resp: 16   Temp: 98.2  F (36.8  C)   SpO2: 96%       Gen: well appearing  CV: RRR, normal S1S2, no M, R, G  Pulm: CTAB, normal effort  Abd: normal inspection, normal bowel sounds, soft, moderate pain to palpation of epigastric area, mild tenderness to palpation of RUQ, normal palpation of other quadrants, no mass/HSM    Us Abdomen Limited    Result Date: 10/31/2019  EXAM DATE:         10/31/2019 EXAM: US ABDOMEN LIMITED LOCATION: [facility] DATE/TIME: 10/31/2019 8:30 AM INDICATION: Epigastric and right upper quadrant pain COMPARISON: None. TECHNIQUE: Limited abdominal ultrasound. FINDINGS: GALLBLADDER: Normal. No gallstones, wall thickening, or pericholecystic fluid. Negative sonographic Tran's sign. BILE DUCTS: No biliary dilatation. The common duct measures 3 mm. LIVER: Echogenic liver parenchyma with smooth border suggestive of steatosis. Focal area of lesser echogenicity adjacent to the gallbladder fossa is likely focal sparing. RIGHT KIDNEY: No hydronephrosis. PANCREAS: The visualized portions are normal. No ascites. IMPRESSION: 1.  Hepatic steatosis with focal sparing adjacent to the gallbladder fossa.         Abdominal pain, epigastric - patient reported significant improvement after GI cocktail, suspect GERD. Will start Pepcid and Prilosec, and have patient f/u with PCP, sooner in ER should symptoms escalate.   -     aluminum-magnesium hydroxide-simethicone 15 mL, viscous lidocaine HC 15 mL (GI COCKTAIL)  -     US Abdomen Limited  -     famotidine (FOR PEPCID) 10 MG tablet; Take 1 tablet (10 mg total) by mouth 2 (two) times a day.  -     omeprazole (PRILOSEC) 20 MG  capsule; Take 1 capsule (20 mg total) by mouth 2 (two) times a day.    RUQ abdominal pain - there is only partial relief after GI cocktail. US was obtained and results are as above.   -     US Abdomen Limited    Hepatic steatosis - discussed etiology, advised life style modifications. Patient will f/u with PCP for this.

## 2021-06-03 VITALS
HEART RATE: 83 BPM | TEMPERATURE: 98.2 F | BODY MASS INDEX: 39.27 KG/M2 | RESPIRATION RATE: 16 BRPM | DIASTOLIC BLOOD PRESSURE: 65 MMHG | OXYGEN SATURATION: 96 % | WEIGHT: 214.7 LBS | SYSTOLIC BLOOD PRESSURE: 119 MMHG

## 2021-06-12 NOTE — PROGRESS NOTES
"Office Visit  [facility]  Date of Service: 10/26/2020      Subjective   Luan Morgan is a 39 y.o. male who presents for Follow-up    Completed 13 days of H pylori triple therapy on 10/24/20.    Chest/neck/upper abdominal pain  Feeling much better.  Neck still hurts - right side, felt worse yesterday, now better. Radiates in front of ear and back of neck.  Throat - can swallow now  RUQ pain - radiates to back, better, but   Chest tightness - had it a little yesterday. Very mild <1/10. Heart goes fast with exercise  Feels kidneys \"pumping\" (heartbeat sensation) and \"liver bloating \"  Urine was dark brown with foam. Went to urgent care. Concentrated urine.    Diabetes  Blood sugars have mostly been in 70-80s  Yesterday: donut & cupcake - went up to 90.   Highest was mid 100s.  Wonders if he really has diabetes  Acanthosis nigricans improving  24# weight loss in the last month.      No data recorded   Objective   /74 (Patient Site: Left Arm, Patient Position: Sitting, Cuff Size: Adult Regular)   Pulse 86   Resp 16   Wt 197 lb (89.4 kg)   SpO2 99%   BMI 34.90 kg/m     He reports that he quit smoking about 7 years ago. His smoking use included cigarettes. He has a 52.50 pack-year smoking history. He has never used smokeless tobacco.  Wt Readings from Last 6 Encounters:   10/26/20 197 lb (89.4 kg)   10/15/20 207 lb (93.9 kg)   10/14/20 210 lb (95.3 kg)   10/07/20 213 lb 4.8 oz (96.8 kg)   10/03/20 221 lb (100.2 kg)   10/02/20 200 lb (90.7 kg)       Gen: Alert, no apparent distress.   Tonsils/oropharynx: no tonsillar hypertrophy, normal oropharynx.   Neck: no significant lymphadenopathy, thyroid not enlarged and not tender.  Tenderness at proximal right SCM and inferior masseter muscle.  Heart: Regular rate and rhythm, no murmurs.  Lungs: Clear to auscultation bilaterally, no increased work of breathing.  Abdomen: Soft, mildly tender at hepatic margin, " non-distended, bowel sounds normal.  Extremities: No clubbing, cyanosis, edema.  Skin: acanthosis nigricans    Results for orders placed or performed in visit on 10/26/20   Glycosylated Hemoglobin A1c   Result Value Ref Range    Hemoglobin A1c 6.7 (H) <=5.6 %   Urinalysis   Result Value Ref Range    Color, UA Yellow Colorless, Yellow, Straw, Light Yellow    Clarity, UA Clear Clear    Glucose, UA Negative Negative    Bilirubin, UA Moderate (!) Negative    Ketones, UA >=160 mg/dL (!) Negative    Specific Gravity, UA 1.025 1.005 - 1.030    Blood, UA Trace (!) Negative    pH, UA 6.0 5.0 - 8.0    Protein, UA Trace (!) Negative mg/dL    Urobilinogen, UA 1.0 E.U./dL 0.2 E.U./dL, 1.0 E.U./dL    Nitrite, UA Negative Negative    Leukocytes, UA Negative Negative    Bacteria, UA Moderate (!) None Seen hpf    RBC, UA 0-2 None Seen, 0-2 hpf    WBC, UA 0-5 None Seen, 0-5 hpf    Squam Epithel, UA 0-5 None Seen, 0-5 lpf    Mucus, UA Few (!) None Seen lpf     No results found.    Assessment & Plan     1. Neck pain - by exam this is musculoskeletal today. Suspect some referred pain from GERD/esophagitis was also contributing before.  2. Epigastric pain - history of hepatic steatosis with abnormal lfts. Since treatment of H pylori, significant relief of pain with residual RUQ tenderness. Suspect resolving gastritis. Recheck labs to assess for transaminitis. Check Hep B serologies. Due for hep B and hep A immunization, but Luan prefers to defer until he is feeling better.  3. DM2, diet-controlled. A1c now at goal. Making significant lifestyle changes now with goal of keeping the weight off as he starts to feel better. Pneumovax ordered, referral made for eye exam.  4. Hyperlipidemia. Recheck lipids. Luan prefers to not use medication, if not needed. Discussed lifestyle modification with a plan to start a statin if persistent lipid elevation in six months.  5. Flank pain. Normal exam today, some bacteria on urine. Await culture result.    6. Chest pain. In patient with newly diagnosed diabetes, and probably longstanding hyperlipidemia. Planning exercise stress tomorrow.       Order Summary                                                      1. Type 2 diabetes mellitus without complication, without long-term current use of insulin (H)  HM DIABETES FOOT EXAM    Pneumococcal polysaccharide vaccine 23-valent 3 yo or older, subq/IM    Microalbumin, Random Urine    Ambulatory referral to Ophthalmology    Glycosylated Hemoglobin A1c    Urinalysis   2. Immunization due  Pneumococcal polysaccharide vaccine 23-valent 3 yo or older, subq/IM    Influenza, Seasonal Quad, PF =/> 6months   3. Dyslipidemia, goal LDL below 70  Lipid Cascade   4. Nonalcoholic hepatosteatosis  Comprehensive Metabolic Panel    Hepatitis B Surface Antigen (HBsAG)    Hepatitis B Surface Antibody (Anti-HBs)    Hepatitis A adult 2 dose IM (19 yr & older)    Hepatitis B Vaccine Age 20 years and above   5. Helicobacter pylori stool test positive  H. pylori Antigen, Stool(HPSAG)   6. Flank pain  Culture, Urine      Future Appointments   Date Time Provider Department Center   10/27/2020  7:00 AM JN NM CH 1 JN NM JN   10/27/2020  7:15 AM JN NM A JN NM JN   10/27/2020  7:45 AM JN HC NM STRESS 1 JN CTST JN   10/27/2020  9:30 AM JN NM A JN NM JN   11/4/2020 10:30 AM Maya Mace, Diabetes Ed [facility]   12/15/2020  7:30 AM JN HCC LAB HCC JN HCC JN       Completed by: Shahla Joaquin M.D., [facility]. 10/26/2020 7:41 AM.  This transcription uses voice recognition software, which may contain typographical errors.

## 2021-06-12 NOTE — PROGRESS NOTES
Please let patient know if his lab results and to follow up with PCP to discuss optimization of care for high cholesterol and diabetes.

## 2021-06-12 NOTE — PATIENT INSTRUCTIONS - HE
Roland,    It was a pleasure to see you today at [facility].    My nurse or I will [facility]    Please start taking rosuvastatin 10 mg by mouth each day; we will recheck your lipid profile in 2 months. Your goal LDL is less than 70 mg/dl.    Please call us if you have any questions or concerns about your heart.      Hipolito Barr M.D.  [facility]

## 2021-06-12 NOTE — TELEPHONE ENCOUNTER
Wellness Screening Tool  Symptom Screening:  Do you have one of the following NEW symptoms:    Fever (subjective or >100.0)?  No    A new cough?  No    Shortness of breath?  No     Chills? No     New loss of taste or smell? No     Generalized body aches? No     New persistent headache? No     New sore throat? No     Nausea, vomiting, or diarrhea?  No    Within the past 2 weeks, have you been exposed to someone with a known positive illness below:    COVID-19 (known or suspected)?  No    Chicken pox?  No    Mealses?  No    Pertussis?  No    Patient notified of visitor policy- They may have one person accompany them to their appointment, but they will need to wear a mask and will be screened upon arrival for symptoms: Yes  Pt informed to wear a mask: Yes  Pt notified if they develop any symptoms listed above, prior to their appointment, they are to call the clinic directly at [phone] for further instructions.  Yes  Patient's appointment status: 10/15

## 2021-06-12 NOTE — PATIENT INSTRUCTIONS - HE
Abdominal + Chest Pain  Avoid these foods:   fatty, greasy, or oily foods  chocolate  tomatoes and tomato sauce  spices (like onion, pepper, garlic)  citrus like lemon, lime, orange, grapefruit  mint  Avoid these beverages:   alcohol  caffeine  juices (like orange juice, grapefruit juice, tomato juice, apple juice)  coffee  tea  pop  other carbonated beverages  Eat smaller meals.  Don't eat before bedtime.  Avoid tight fitting clothes.  Lose weight.  Avoid tobacco.

## 2021-06-12 NOTE — PROGRESS NOTES
Chief Complaint   Patient presents with     Abdominal Pain     LUQ pain, lower abdominal pain, bloated, diarrhea 2 days ago, constipated now. X3 days      Blood pressure 144/88, pulse 78, temperature 97.8  F (36.6  C), temperature source Oral, resp. rate 18, weight 221 lb (100.2 kg), SpO2 97 %.         JOSE Roland is a 39 y.o.  male with a PMH significant for:   There is no problem list on file for this patient.    Complaining of epigastric abdominal pain since Thursday.  Was seen yesterday at [place]'s ED for cardiac rule out, was told that it work-up was unremarkable and sent home.  Chest x-ray negative.  BMP notable for glucose of 156.  D-dimer, CBC, BMP, troponin negative.  Unclear why patient was not sent home with any GI medications.  Still with epigastric pain, described as sharp and burning rated at 8 out of 10 at its worst.  Reports that spicy and greasy foods makes it worse.  Able to tolerate liquids okay.  Denies any nausea or vomiting.    Also describes constipation, last bowel movement was this morning, mixed loose and hard stools.  Denies any blood in the stools.  No dysuria.  Does have some lower abdominal cramping pain.    Patient has not tried any medications for any of his symptoms at the moment.  Of note, he was seen on 10/31/2019 for similar symptoms and was sent home with PPI and famotidine.  Ultrasound of abdomen at that time was notable for hepatic steatosis.nStates that omeprazole made the epigastric pain better, but caused constipation thought to be secondary to decreased digestion of food.  Patient has tried Prevacid over-the-counter which works better for him.       Patient often also feels like there is water is in his ears, right worse than left.  Has been having some congestion over the past few days.  Denies any fevers, chills, shortness of breath, cough, lightheadedness or dizziness.    PMH, Medications and Allergies were reviewed and updated as needed.        REVIEW OF  SYSTEMS     A 12 point comprehensive review of systems was negative except as noted.        OBJECTIVE     Vitals:    10/03/20 1015   BP: 144/88   Patient Site: Right Arm   Patient Position: Sitting   Cuff Size: Adult Large   Pulse: 78   Resp: 18   Temp: 97.8  F (36.6  C)   TempSrc: Oral   SpO2: 97%   Weight: 221 lb (100.2 kg)     Body mass index is 37.93 kg/m .    Constitutional: Awake, alert, cooperative, no acute distress, and appears stated age.  Eyes: sclera clear, conjunctiva normal.  ENT: NC/AT, MMM  Neck: Supple, symmetrical, trachea midline  Back: Symmetric, no curvature  Lungs: No increased WOB, CTABL, no crackles or wheezing appreciated.  Cardiovascular: Appears well perfused, RRR, normal S1 and S2, no S3 or S4, and no murmur appreciated.  Abdomen: Obese, normal BS, soft, tenderness to palpation of the epigastric and lower abdomen without rebound tenderness or guarding.  Musculoskeletal: No redness, warmth, or swelling of the joints. Tone is normal.  Neurologic: Cranial nerves II-XII are grossly intact.  Sensory is intact. Gait is normal.  Neuropsychiatric: Normal affect, mood, orientation, memory and insight.  Skin: Chronic hyperpigmented skin changes throughout    Pertinent Labs  Recent Results (from the past 24 hour(s))   ECG 12 lead nursing unit performed   Result Value Ref Range    SYSTOLIC BLOOD PRESSURE      DIASTOLIC BLOOD PRESSURE      VENTRICULAR RATE 107 BPM    ATRIAL RATE 107 BPM    P-R INTERVAL 178 ms    QRS DURATION 106 ms    Q-T INTERVAL 336 ms    QTC CALCULATION (BEZET) 448 ms    P Axis 28 degrees    R AXIS 81 degrees    T AXIS -2 degrees    MUSE DIAGNOSIS       Sinus tachycardia  Abnormal QRS-T angle, consider primary T wave abnormality  Abnormal ECG  No previous ECGs available  Confirmed by SEE ED PROVIDER NOTE FOR, ECG INTERPRETATION ([id number]),  MAIK OROZCO ([id number]) on 10/3/2020 6:38:11 AM     Troponin I   Result Value Ref Range    Troponin I <0.01 0.00 - 0.29 ng/mL   BNP(B-type  Natriuretic Peptide)   Result Value Ref Range    BNP <10 0 - 35 pg/mL   Basic Metabolic Panel   Result Value Ref Range    Sodium 136 136 - 145 mmol/L    Potassium 3.6 3.5 - 5.0 mmol/L    Chloride 101 98 - 107 mmol/L    CO2 23 22 - 31 mmol/L    Anion Gap, Calculation 12 5 - 18 mmol/L    Glucose 159 (H) 70 - 125 mg/dL    Calcium 9.1 8.5 - 10.5 mg/dL    BUN 20 8 - 22 mg/dL    Creatinine 1.02 0.70 - 1.30 mg/dL    GFR MDRD Af Amer >60 >60 mL/min/1.73m2    GFR MDRD Non Af Amer >60 >60 mL/min/1.73m2   D-dimer, Quantitative   Result Value Ref Range    D-Dimer, Quant <0.27 <=0.50 FEU ug/mL   HM1 (CBC with Diff)   Result Value Ref Range    WBC 11.6 (H) 4.0 - 11.0 thou/uL    RBC 5.46 4.40 - 6.20 mill/uL    Hemoglobin 15.2 14.0 - 18.0 g/dL    Hematocrit 47.4 40.0 - 54.0 %    MCV 87 80 - 100 fL    MCH 27.8 27.0 - 34.0 pg    MCHC 32.1 32.0 - 36.0 g/dL    RDW 13.2 11.0 - 14.5 %    Platelets 297 140 - 440 thou/uL    MPV 9.4 8.5 - 12.5 fL    Neutrophils % 45 (L) 50 - 70 %    Lymphocytes % 47 (H) 20 - 40 %    Monocytes % 6 2 - 10 %    Eosinophils % 2 0 - 6 %    Basophils % 0 0 - 2 %    Immature Granulocyte % 1 (H) <=0 %    Neutrophils Absolute 5.2 2.0 - 7.7 thou/uL    Lymphocytes Absolute 5.4 (H) 0.8 - 4.4 thou/uL    Monocytes Absolute 0.7 0.0 - 0.9 thou/uL    Eosinophils Absolute 0.2 0.0 - 0.4 thou/uL    Basophils Absolute 0.0 0.0 - 0.2 thou/uL    Immature Granulocyte Absolute 0.1 (H) <=0.0 thou/uL   Glycosylated Hemoglobin A1c   Result Value Ref Range    Hemoglobin A1c 7.3 (H) <=5.6 %     Lab Results: personally reviewed.     Pertinent Radiology:  Xr Chest 2 Views    Result Date: 10/2/2020  EXAM: XR CHEST 2 VIEWS LOCATION: [facility] DATE/TIME: 10/2/2020 10:49 PM INDICATION: cp COMPARISON: None.     Negative chest.    Radiology Results: personally reviewed.     Cardiographics:   EKG Results: personally reviewed.   EKG: nonspecific ST and T waves changes, sinus tachycardia.      ASSESSMENT AND PLAN     Hong  was seen today for abdominal pain.    Diagnoses and all orders for this visit:    Abdominal pain, epigastric  Likely secondary to reflux versus gastritis.  Reassuring that patient had a negative cardiac work-up in the ED yesterday.  Advised to take Prevacid daily and famotidine as needed.  If symptoms continue to persist, may need to GI referral for endoscopy.  -     famotidine (PEPCID) 40 MG tablet; Take 1 tablet (40 mg total) by mouth 2 (two) times a day as needed for heartburn.  -     lansoprazole (PREVACID) 15 MG capsule; Take 1 capsule (15 mg total) by mouth daily.    Constipation, unspecified constipation type  Likely secondary to viral gastroenteritis, given initial diarrhea, but now with constipation.  -     polyethylene glycol (GLYCOLAX) 17 gram/dose powder; Take 17 g by mouth daily as needed (contipation). Titrate to 1 loose bowel movement per day. If too loose, half the dose. If too solid, double the dose.    Screening for diabetes mellitus  Lipid screening  Positive family history for diabetes.  Advised to modify lifestyle changes including diet and exercise.  May need to start on metformin and other oral anti-hypoglycemics in the future.  Follow-up with PCP.  -     Glycosylated Hemoglobin A1c  -     Lipid Profile      Of note, patient is due for Tdap and influenza, but declining at this time due to not wanting to exacerbate current illness.      Pola Childers  10/3/2020

## 2021-06-12 NOTE — PROGRESS NOTES
Assessment/Plan:   Bilateral flank pain  Throat/neck pain  Dark urine  There is bilirubin in the urine with some protein and mucus along with ketones (hasn't eaten today) and fairly concentrated specific gravity leading to the color and froth changes. No sign of infection. LFTs are slightly elevated but a little better than before, renal function within normal limits. There is no definite process to explain the mild flank pains with percussion and change in urine but he was reassured that the kidneys were working and liver stable. Close follow up with primary care and I think he can safely continue the antibiotics to complete the last 4 days of treatment.   He swallowed a GI cocktail to see if the sharp poke like pain in the right side throat was dulled by the lidocaine which would help us locate whether there was an esophageal source or external source of the pain. The sharp pain was blocked with the lidocaine but the soreness with palpating the right side throat remained. So I think he may have scratched the throat/esophagus, perhaps from swallowing all pills at once which is causing the foreign body sensation and sticking with swallowing that he notices. TSH is normal and thyroid itself doesn't seem so tender. moreso the neck lateral to the thyroid and there is maybe some adenopathy. Normal CBC. Consider also thrush though no signs of it in mouth. Use mylanta if needed to coat throat symptomatically. Take pills one at a time with plenty of water to make sure they go all the way down. If worse or no better may need imaging or scope.   Follow up as planned with primary care on Monday to recheck.   I discussed red flag symptoms, return precautions to clinic/ER and follow up care with patient/parent.  Expected clinical course, symptomatic cares advised. Questions answered. Patient/parent amenable with plan.  - Urinalysis-UC if Indicated  - Comprehensive Metabolic Panel  - HM1(CBC and Differential)  - Thyroid  Cascade  - aluminum-magnesium hydroxide-simethicone 15 mL, viscous lidocaine HC 15 mL (GI COCKTAIL)    Drink plenty of water as tolerated.   Finish the antibiotic treatment but swallow just one pill at a time with plenty of water to get it all the way down.   May use over the counter mylanta or equivalent 10ml to swallow and coat the esophagus which may have been scratched.   If fever, severe pain, inability to swallow then need to be seen immediately otherwise follow up with primary care on Monday as planned to recheck.     Patient and  wore masks.   Provider and rooming staff wore masks, face shields.     Subjective:      Luan Morgan is a 39 y.o. male who presents 3-4 days of throat and back pain. He has been taking amoxicillin and clarithromycin and prevacid for H. Pylori infection since 10 /12/20 (prevacid started earlier). He is actually feeling much better with regard to that issue - still a little bloated and soft stools but not significant diarrhea. He has 4 days remaining.   However he has noticed in the last 3 days bilateral flank 'kidney' pain that comes on 40-60 minutes after taking the clarithromycin. There has been a constant soreness in mid back for 3 days that gets worse with movement and taking those pills. He has had a change in urine color since last night - it is darker like beer and foamy on top, no fever. No definite blood. He denies eating anything that would change urine color and states usually urine is pretty clear/white. BMs have been yellowish/tan/light brown, not chalky. No abdominal pain. No urinary urgency or frequency. He is worried that the medications are shutting his kidneys down.   He has CT scan a week ago or so which showed fatty liver but no other abnormalities.   He has been prescribed crestor to take for elevated cholesterol but was told to wait until after the h pylori treatment to start it so hasn't started it yet.   The other thing that happened about 3 days ago  was he developed pain in the right side of his throat lateral to the voice box. It feels swollen in this area and sore all the time with a sharp poke type of pain with swallow or palpation of that area. He does have a sensation of something in his throat and things sticking with swallow. He had been swallowing the amoxicillin and clarithromycin all together. No other injury. No fever or chills, no URI or cough or ST. No sores in the mouth or pain on the tongue or upper pharynx. No stiffness of the neck. There was some posterior neck soreness the first day of onset, none now. No rash. No hoarseness. No palpitations. He has had weight loss over the last month or two. He is worried about thyroid or tumor.   No known ill contacts.   No high risk exposures for coronavirus.  Former smoker.     No Known Allergies    Current Outpatient Medications on File Prior to Visit   Medication Sig Dispense Refill     amoxicillin (AMOXIL) 500 MG tablet Take 2 tablets (1,000 mg total) by mouth 2 (two) times a day for 14 days. 56 tablet 0     clarithromycin (BIAXIN) 500 MG tablet Take 1 tablet (500 mg total) by mouth 2 (two) times a day for 14 days. For Helicobacter pylori gastritis. 28 tablet 0     lansoprazole (PREVACID) 30 MG capsule Take 1 capsule (30 mg total) by mouth 2 (two) times a day for 14 days. 28 capsule 0     alcohol swabs PadM Apply 1 each topically daily. 100 each 3     aspirin 81 mg chewable tablet Chew 81 mg daily.       blood glucose test strips Use 1 each As Directed daily. Dispense brand per patient's insurance at pharmacy discretion. 100 strip 3     lancets 32 gauge Misc Use 1 each As Directed daily. 100 each 3     rosuvastatin (CRESTOR) 10 MG tablet Take 1 tablet (10 mg total) by mouth daily. 90 tablet 3     No current facility-administered medications on file prior to visit.      Patient Active Problem List   Diagnosis     Type 2 diabetes mellitus (H)     Dyslipidemia, goal LDL below 70     Nonalcoholic  hepatosteatosis     Severe obesity (BMI 35.0-35.9 with comorbidity) (H)     Chest pain     Bochdalek hernia       Objective:     /85 (Patient Site: Right Arm, Patient Position: Sitting, Cuff Size: Adult Regular)   Pulse 86   Temp 97.6  F (36.4  C) (Oral)   Resp 16   SpO2 97%     Physical  General Appearance: Alert, cooperative, no distress, aVSS  Head: Normocephalic, without obvious abnormality, atraumatic  Eyes: Conjunctivae are normal. No scleral icterus.   Nose: No significant congestion.  Throat: Throat is normal.  No exudate.  No significant lesions. No thrush.   Neck: supple, there is tenderness to palpate along the right edge of the thyroid, no palpable thyroid nodule or enlargement. Possible tender anterior cervical nodes on the right.   Lungs: Clear to auscultation bilaterally, respirations unlabored  Heart: Regular rate and rhythm  Abdomen: Soft, non-tender, no masses, no organomegaly. Negative mcmurrays. No epigastric pain. There is CVA tenderness with percussion - left side hurts with tapping on the right side and right side hurts with tapping on the left side. There is pain both sides with tapping on the spine but no spine tenderness. No low back pain. No definite muscle spasm. No radiation to the groin or abdomen.   Skin: Skin color, texture, turgor normal, no rashes or lesions. No jaundice.   Psychiatric: Patient has a normal mood and affect.        Recent Results (from the past 24 hour(s))   Comprehensive Metabolic Panel   Result Value Ref Range    Sodium 139 136 - 145 mmol/L    Potassium 4.4 3.5 - 5.0 mmol/L    Chloride 102 98 - 107 mmol/L    CO2 22 22 - 31 mmol/L    Anion Gap, Calculation 15 5 - 18 mmol/L    Glucose 85 70 - 125 mg/dL    BUN 13 8 - 22 mg/dL    Creatinine 1.26 0.70 - 1.30 mg/dL    GFR MDRD Af Amer >60 >60 mL/min/1.73m2    GFR MDRD Non Af Amer >60 >60 mL/min/1.73m2    Bilirubin, Total 1.1 (H) 0.0 - 1.0 mg/dL    Calcium 9.9 8.5 - 10.5 mg/dL    Protein, Total 8.7 (H) 6.0 - 8.0  g/dL    Albumin 5.0 3.5 - 5.0 g/dL    Alkaline Phosphatase 73 45 - 120 U/L    AST 52 (H) 0 - 40 U/L    ALT 95 (H) 0 - 45 U/L   Thyroid Cascade   Result Value Ref Range    TSH 2.37 0.30 - 5.00 uIU/mL   HM1 (CBC with Diff)   Result Value Ref Range    WBC 8.0 4.0 - 11.0 thou/uL    RBC 5.75 4.40 - 6.20 mill/uL    Hemoglobin 16.2 14.0 - 18.0 g/dL    Hematocrit 49.3 40.0 - 54.0 %    MCV 86 80 - 100 fL    MCH 28.2 27.0 - 34.0 pg    MCHC 32.9 32.0 - 36.0 g/dL    RDW 12.8 11.0 - 14.5 %    Platelets 345 140 - 440 thou/uL    MPV 9.6 8.5 - 12.5 fL    Neutrophils % 45 (L) 50 - 70 %    Lymphocytes % 46 (H) 20 - 40 %    Monocytes % 7 2 - 10 %    Eosinophils % 1 0 - 6 %    Basophils % 1 0 - 2 %    Immature Granulocyte % 0 <=0 %    Neutrophils Absolute 3.6 2.0 - 7.7 thou/uL    Lymphocytes Absolute 3.7 0.8 - 4.4 thou/uL    Monocytes Absolute 0.5 0.0 - 0.9 thou/uL    Eosinophils Absolute 0.1 0.0 - 0.4 thou/uL    Basophils Absolute 0.0 0.0 - 0.2 thou/uL    Immature Granulocyte Absolute 0.0 <=0.0 thou/uL   Urinalysis-UC if Indicated   Result Value Ref Range    Color, UA Yellow Colorless, Yellow, Straw, Light Yellow    Clarity, UA Clear Clear    Glucose, UA Negative Negative    Bilirubin, UA Moderate (!) Negative    Ketones, UA 80 mg/dL (!) Negative    Specific Gravity, UA 1.025 1.005 - 1.030    Blood, UA Negative Negative    pH, UA 5.5 5.0 - 8.0    Protein, UA 30 mg/dL (!) Negative mg/dL    Urobilinogen, UA 0.2 E.U./dL 0.2 E.U./dL, 1.0 E.U./dL    Nitrite, UA Negative Negative    Leukocytes, UA Negative Negative    Bacteria, UA None Seen None Seen hpf    RBC, UA 0-2 None Seen, 0-2 hpf    WBC, UA 0-5 None Seen, 0-5 hpf    Squam Epithel, UA None Seen None Seen, 0-5 lpf    Mucus, UA Moderate (!) None Seen lpf

## 2021-06-12 NOTE — PATIENT INSTRUCTIONS - HE
Drink plenty of water as tolerated.   Finish the antibiotic treatment but swallow just one pill at a time with plenty of water to get it all the way down.   May use over the counter mylanta or equivalent 10ml to swallow and coat the esophagus which may have been scratched.   If fever, severe pain, inability to swallow then need to be seen immediately otherwise follow up with primary care on Monday as planned to recheck.

## 2021-06-12 NOTE — TELEPHONE ENCOUNTER
"Symptoms starting yesterday with sore throat. Patient reporting \"lump\" in back of throat. Patient reporting he is able to swallow solid foods with pain. Taking fluids. Stating he took Benadryl 47 minutes ago. Denies any difficulty breathing. Afebrile. Denies other symptoms. Patient rating pain level \"7\" on 1-10 pain scale. Stating he is unable to see swelling. Patient is able to open mouth.     Advised virtual visit for today. Transferred to Central Scheduling.     COVID 19 Nurse Triage Plan/Patient Instructions    Please be aware that novel coronavirus (COVID-19) may be circulating in the community. If you develop symptoms such as fever, cough, or SOB or if you have concerns about the presence of another infection including coronavirus (COVID-19), please contact your health care provider or visit [email or web address].     Disposition/Instructions    Virtual Visit with provider recommended. Reference Visit Selection Guide.    Thank you for taking steps to prevent the spread of this virus.  o Limit your contact with others.  o Wear a simple mask to cover your cough.  o Wash your hands well and often.    Resources    [facility]: About COVID-19: [email or web address]    CDC: What to Do If You're Sick: www.cdc.gov/coronavirus/2019-ncov/about/steps-when-sick.html    CDC: Ending Home Isolation: www.cdc.gov/coronavirus/2019-ncov/hcp/disposition-in-home-patients.html     CDC: Caring for Someone: www.cdc.gov/coronavirus/2019-ncov/if-you-are-sick/care-for-someone.html     [facility]: Interim Guidance for Hospital Discharge to Home: www.health.[facility].[place]/diseases/coronavirus/hcp/hospdischarge.pdf    [facility] clinical trials (COVID-19 research studies): clinicalaffairs.[facility]-clinical-trials     Below are the COVID-19 hotlines at the [place] [facility] ([facility]). Interpreters are available.   o For health questions: Call 968-086-7266 or 1-395.166.6365 (7 a.m. to 7 p.m.)  o For questions about " schools and childcare: Call 367-598-4735 or 1-807.896.4046 (7 a.m. to 7 p.m.)     Reason for Disposition    SEVERE sore throat pain    Additional Information    Negative: SEVERE difficulty breathing (e.g., struggling for each breath, speaks in single words)    Negative: Sounds like a life-threatening emergency to the triager    Negative: Drooling or spitting out saliva (because can't swallow)    Negative: Unable to open mouth completely    Negative: Drinking very little and has signs of dehydration (e.g., no urine > 12 hours, very dry mouth, very lightheaded)    Negative: Patient sounds very sick or weak to the triager    Negative: Difficulty breathing (per caller) but not severe    Negative: Fever > 103 F (39.4 C)    Negative: Refuses to drink anything for > 12 hours    Negative: Throat culture results, call about    Negative: Productive cough is the main symptom    Negative: Runny nose is the main symptom    Protocols used: SORE THROAT-A-OH

## 2021-06-12 NOTE — PROGRESS NOTES
[facility] Note                                                        Date of Visit: 10/7/2020     Chief Complaint   Luan Morgan is a(n) 39 y.o.  male who presents to [facility], accompanied by his wife, with the following complaint(s):  Bloated (lower abdomen pains x 1 week, persistant pains worsening last few days ) and Diarrhea       Assessment and Plan   1. Abdominal pain, epigastric  - HM1(CBC and Differential)  - Hepatic Profile  - Lipase  - XR Abdomen 2 Views; Future  - XR Abdomen 2 Views  - JIC RED  - CT Abdomen Pelvis With Oral With IV Contrast; Future  - CT Abdomen Pelvis With Oral With IV Contrast  - H. pylori Antigen, Stool; Future    2. Abdominal pain, right lower quadrant  - HM1(CBC and Differential)  - Hepatic Profile  - Lipase  - XR Abdomen 2 Views; Future  - XR Abdomen 2 Views  - JIC RED  - CT Abdomen Pelvis With Oral With IV Contrast; Future  - CT Abdomen Pelvis With Oral With IV Contrast  - H. pylori Antigen, Stool; Future    3. Abdominal pain, left lower quadrant  - HM1(CBC and Differential)  - Hepatic Profile  - Lipase  - XR Abdomen 2 Views; Future  - XR Abdomen 2 Views  - JIC RED  - CT Abdomen Pelvis With Oral With IV Contrast; Future  - CT Abdomen Pelvis With Oral With IV Contrast  - H. pylori Antigen, Stool; Future    4. Nausea  - HM1(CBC and Differential)  - Hepatic Profile  - Lipase  - XR Abdomen 2 Views; Future  - XR Abdomen 2 Views  - JIC RED  - CT Abdomen Pelvis With Oral With IV Contrast; Future  - CT Abdomen Pelvis With Oral With IV Contrast  - H. pylori Antigen, Stool; Future      Patient with recently diagnosed type 2 diabetes mellitus and hyperlipidemia presenting for evaluation of ongoing abdominal pain. He was evaluated in the ED for chest pain and shortness of breath on 10/2/2020, where EKG, chest x-ray, Troponin, BNP, and D-Dimer were negative; CBC showed mild leukocytosis with lymphocytic shift but normal hemoglobin, and BMP was normal except for a random glucose of  159. He was evaluated in [facility] for epigastric abdominal pain on 10/3/2020, at which time famotidine 40 mg two times a day, lansoprazole 15 mg daily, and polyethylene glycol 17 gm daily were prescribed; HbA1c was found to be 7.3% and Lipid Profile showed elevated Total Cholesterol and LDL but normal Triglycerides at that time. Of note, patient was seen for similar gastrointestinal symptoms on 10/31/2019, at which time RUQ abdominal ultrasound showed hepatic steatosis and normal gallbladder; famotidine and omeprazole were prescribed at that time, though patient noted that the omeprazole seemed to cause constipation. He ultimately stopped the medications after his symptoms resolved. Repeat CBC today shows normal hemoglobin and WBC with slight lymphocytosis. Abdominal x-rays obtained today rule out constipation, which was one of the patient's main concerns. Hepatic Profile shows mildly elevated transaminases, likely due to hepatic steatosis. Normal Lipase rules out pancreatitis. CT of the abdomen / pelvis was completed to evaluate for obstruction; this study is normal except for hepatic steatosis. Have ordered testing for H pylori. Recommended that patient continue lansoprazole for now but that he discontinue polyethylene glycol. Discussed possible referral to Gastroenterology for EGD if symptoms persist.     Counseled patient and his wife regarding assessment and plan for evaluation and treatment. Questions were answered. See AVS for the specific written instructions and educational handout(s) regarding abdominal pain with uncertain cause that were provided at the conclusion of the visit.     Discussed signs / symptoms that warrant urgent / emergent medical attention.     Follow up with Primary Care tomorrow as scheduled.      History of Present Illness   Primary symptom: Abdominal pain  Onset: 1 week ago  Progression: Worsening  Location: Across the lower abdomen  Quality: Burning  Radiation: No  Frequency:  Constant  Relieving factors: None  Exacerbating factors: Laying on either his right or left side in bed.   Appetite change: Decreased  Nausea: Yes  Vomiting: No  Diarrhea: 2 episodes in the past week  Constipation: Yes, for the past week  Melena: No  Hematochezia: No  Urinary symptoms: Notes polyuria and nocturia (4-5 times per night). Also incidentally notes polydipsia. Denies hematuria. Urine is cloudy or malodorous.   Genital symptoms: Denies urethral discharge and testicular pain / swelling.   Fevers: No  Chills: No  Additional symptoms: Has noted central anterior chest pain and epigastric pain over this same time frame. Periodically feels short or breath, which is associated with his abdomen feeling bloated. Notes weight loss of several pounds in recent weeks.   History of similar pain: No  History of abdominal surgery: No  Prior endoscopy: No  Home treatments utilized: Has been taking lansoprazole since being seen on 10/3/2020. Took a single dose of Miralax on 10/3/2020 but stopped it since he did not appreciate improvement in constipation following that dose.   Additional pertinent history: Was evaluated in the ED at [facility] on 10/2/2020 due to chest pain. Was seen in [facility] on 10/3/2020 for abdominal pain.      Review of Systems   Review of Systems   All other systems reviewed and are negative.       Physical Exam   Vitals:    10/07/20 0903   BP: 149/84   Pulse: 82   Resp: 16   Temp: 98.3  F (36.8  C)   TempSrc: Oral   SpO2: 96%   Weight: 213 lb 4.8 oz (96.8 kg)     Physical Exam  Vitals signs and nursing note reviewed.   Constitutional:       General: He is not in acute distress.     Appearance: He is well-developed and overweight. He is not ill-appearing, toxic-appearing or diaphoretic.   HENT:      Mouth/Throat:      Mouth: Mucous membranes are moist. No oral lesions.      Pharynx: Oropharynx is clear.   Eyes:      General: Lids are normal. No scleral icterus.     Conjunctiva/sclera:  Conjunctivae normal.   Neck:      Musculoskeletal: Neck supple. No edema or erythema.   Cardiovascular:      Rate and Rhythm: Normal rate and regular rhythm.      Heart sounds: S1 normal and S2 normal. No murmur. No friction rub. No gallop.    Pulmonary:      Effort: Pulmonary effort is normal.      Breath sounds: Normal breath sounds. No stridor. No wheezing, rhonchi or rales.   Abdominal:      General: Abdomen is protuberant. Bowel sounds are normal. There is no distension.      Palpations: Abdomen is soft. There is no hepatomegaly, splenomegaly or mass.      Tenderness: There is generalized abdominal tenderness. There is no right CVA tenderness, left CVA tenderness, guarding or rebound.   Lymphadenopathy:      Cervical: No cervical adenopathy.   Skin:     General: Skin is warm and dry.      Coloration: Skin is not pale.      Findings: No rash.      Comments: Acanthosis nigricans on the posterior neck.    Neurological:      General: No focal deficit present.      Mental Status: He is alert and oriented to person, place, and time.   Psychiatric:         Mood and Affect: Mood is anxious. Affect is flat.          Diagnostic Studies   Laboratory:  Results for orders placed or performed in visit on 10/07/20   Hepatic Profile   Result Value Ref Range    Bilirubin, Total 1.1 (H) 0.0 - 1.0 mg/dL    Bilirubin, Direct 0.3 <=0.5 mg/dL    Protein, Total 8.8 (H) 6.0 - 8.0 g/dL    Albumin 4.9 3.5 - 5.0 g/dL    Alkaline Phosphatase 73 45 - 120 U/L    AST 65 (H) 0 - 40 U/L     (H) 0 - 45 U/L   Lipase   Result Value Ref Range    Lipase 20 0 - 52 U/L   HM1 (CBC with Diff)   Result Value Ref Range    WBC 9.8 4.0 - 11.0 thou/uL    RBC 5.79 4.40 - 6.20 mill/uL    Hemoglobin 16.3 14.0 - 18.0 g/dL    Hematocrit 50.0 40.0 - 54.0 %    MCV 86 80 - 100 fL    MCH 28.2 27.0 - 34.0 pg    MCHC 32.6 32.0 - 36.0 g/dL    RDW 13.2 11.0 - 14.5 %    Platelets 352 140 - 440 thou/uL    MPV 9.6 8.5 - 12.5 fL    Neutrophils % 46 (L) 50 - 70 %     Lymphocytes % 48 (H) 20 - 40 %    Monocytes % 5 2 - 10 %    Eosinophils % 0 0 - 6 %    Basophils % 0 0 - 2 %    Immature Granulocyte % 0 <=0 %    Neutrophils Absolute 4.5 2.0 - 7.7 thou/uL    Lymphocytes Absolute 4.7 (H) 0.8 - 4.4 thou/uL    Monocytes Absolute 0.5 0.0 - 0.9 thou/uL    Eosinophils Absolute 0.0 0.0 - 0.4 thou/uL    Basophils Absolute 0.0 0.0 - 0.2 thou/uL    Immature Granulocyte Absolute 0.0 <=0.0 thou/uL       Radiology:  EXAM DATE:         10/07/2020  EXAM: X-RAY ABDOMEN, 2 VIEWS  LOCATION: [facility]  DATE/TIME: 10/7/2020 10:15 AM  INDICATION: Lower abdominal pain, upper abdominal pain, nausea, evaluate for  constipation / obstruction  COMPARISON: None.  IMPRESSION: Negative abdomen. Bowel gas pattern is normal. Minimal air in the  colon. No significant colonic stool. Nothing for obstruction or free air. No  evidence for renal stones.    EXAM DATE:         10/07/2020  EXAM: CT ABDOMEN, PELVIS WITH CONTRAST  LOCATION: [facility]  DATE/TIME: 10/7/2020 11:00 AM  INDICATION: Lower abdominal pain, upper abdominal pain, nausea, evaluate for  constipation / obstruction  COMPARISON: Radiograph 10/7/2020  TECHNIQUE: CT scan of the abdomen and pelvis was performed following injection  of IV contrast. Multiplanar reformats were obtained. Dose reduction techniques  were used.  CONTRAST: 100 ml Isovue 370 was administered via iv from a single use vial. SPR  istat CR= 1.0 mg/dl, eGFR=62.  FINDINGS:  LOWER CHEST: Small left Bochdalek hernia.  HEPATOBILIARY: Hepatic steatosis.  PANCREAS: Normal.  SPLEEN: Normal.  ADRENAL GLANDS: Normal.  KIDNEYS/BLADDER: Normal.  BOWEL: No obstruction or inflammatory change. Normal appendix.  LYMPH NODES: Normal.  VASCULATURE: Unremarkable.  PELVIC ORGANS: Normal.  MUSCULOSKELETAL: Mild degenerative disc disease at L4-5 and L5-S1.  IMPRESSION:  1.  No acute abnormality in the abdomen or pelvis.  2.  Hepatic steatosis.    Electrocardiogram:  N/A      Procedure Note   N/A     Pertinent History   The following portions of the patient's history were reviewed and updated as appropriate: allergies, current medications, past family history, past medical history, past social history, past surgical history and problem list.    Patient has Type 2 diabetes mellitus (H); Mixed hyperlipidemia; and Abdominal pain, epigastric on their problem list.    Patient has a past medical history of Mixed hyperlipidemia (10/3/2020) and Type 2 diabetes mellitus (H) (10/3/2020).    Patient has a past surgical history that includes No past surgeries.    Patient's family history is not on file.    Patient reports that he has quit smoking. He has never used smokeless tobacco.     Portions of this note have been dictated using voice recognition software. Any grammatical or contextual distortions are unintentional and inherent to the software.    Pedro Stearns MD  [facility]

## 2021-06-12 NOTE — PROGRESS NOTES
"Luan Morgan is a 39 y.o. male who is being evaluated via a billable telephone visit.      The patient has been notified of following:     \"This telephone visit will be conducted via a call between you and your physician/provider. We have found that certain health care needs can be provided without the need for a physical exam.  This service lets us provide the care you need with a short phone conversation.  If a prescription is necessary we can send it directly to your pharmacy.  If lab work is needed we can place an order for that and you can then stop by our lab to have the test done at a later time.    Telephone visits are billed at different rates depending on your insurance coverage. During this emergency period, for some insurers they may be billed the same as an in-person visit.  Please reach out to your insurance provider with any questions.    If during the course of the call the physician/provider feels a telephone visit is not appropriate, you will not be charged for this service.\"    Patient has given verbal consent to a Telephone visit? Yes    What phone number would you like to be contacted at? [phone]    Patient would like to receive their AVS by AVS Preference: Vale.    ____________________________    Virtual Visit - Telephone Encounter  [facility]  Family Medicine  Date of Service: 10/20/2020    Subjective:    Right Side of Throat Pain  Feels swollen, in middle of neck/throat  Hard to swallow but can swallow  Saliva is pink-tinged, but not obviously bloody  No fever  Dizzy  Cold  Temp 97.7  Really tired + weak  Started last night  Wondering about thyroid  Wonders if this is due to reflux from H pylori mdicines    Chest Pain  Still there, but hardly noticeable    Stomach/Intestines  Doing OK right now  Currently on day #11 of 14 of triple therapy (amox, clarithromycin, omeprazole) for H pylori  Hurts upper back on both sides    Objective:  There were no vitals taken " for this visit.   Speech is normal  Patient is calm  Admission on 10/14/2020, Discharged on 10/14/2020   Component Date Value Ref Range Status     VENTRICULAR RATE 10/14/2020 82  BPM Final     ATRIAL RATE 10/14/2020 82  BPM Final     P-R INTERVAL 10/14/2020 168  ms Final     QRS DURATION 10/14/2020 104  ms Final     Q-T INTERVAL 10/14/2020 394  ms Final     QTC CALCULATION (BEZET) 10/14/2020 460  ms Final     P Axis 10/14/2020 47  degrees Final     R AXIS 10/14/2020 66  degrees Final     T AXIS 10/14/2020 30  degrees Final     MUSE DIAGNOSIS 10/14/2020    Final                    Value:Normal sinus rhythm  Normal ECG  When compared with ECG of 14-OCT-2020 07:50,  No significant change was found  Confirmed by SEE ED PROVIDER NOTE FOR, ECG INTERPRETATION (4000),  SAMIRA LANG ([id number]) on 10/14/2020 1:23:54 PM       BNP 10/14/2020 <10  0 - 35 pg/mL Final     Troponin I 10/14/2020 <0.01  0.00 - 0.29 ng/mL Final     Sodium 10/14/2020 139  136 - 145 mmol/L Final     Potassium 10/14/2020 3.8  3.5 - 5.0 mmol/L Final     Chloride 10/14/2020 104  98 - 107 mmol/L Final     CO2 10/14/2020 24  22 - 31 mmol/L Final     Anion Gap, Calculation 10/14/2020 11  5 - 18 mmol/L Final     Glucose 10/14/2020 91  70 - 125 mg/dL Final     Calcium 10/14/2020 9.3  8.5 - 10.5 mg/dL Final     BUN 10/14/2020 13  8 - 22 mg/dL Final     Creatinine 10/14/2020 1.01  0.70 - 1.30 mg/dL Final     GFR MDRD Af Amer 10/14/2020 >60  >60 mL/min/1.73m2 Final     GFR MDRD Non Af Amer 10/14/2020 >60  >60 mL/min/1.73m2 Final     WBC 10/14/2020 9.0  4.0 - 11.0 thou/uL Final     RBC 10/14/2020 5.55  4.40 - 6.20 mill/uL Final     Hemoglobin 10/14/2020 15.6  14.0 - 18.0 g/dL Final     Hematocrit 10/14/2020 47.0  40.0 - 54.0 % Final     MCV 10/14/2020 85  80 - 100 fL Final     MCH 10/14/2020 28.1  27.0 - 34.0 pg Final     MCHC 10/14/2020 33.2  32.0 - 36.0 g/dL Final     RDW 10/14/2020 12.7  11.0 - 14.5 % Final     Platelets 10/14/2020 321  140 - 440 thou/uL  Final     MPV 10/14/2020 9.1  8.5 - 12.5 fL Final     Neutrophils % 10/14/2020 51  50 - 70 % Final     Lymphocytes % 10/14/2020 43* 20 - 40 % Final     Monocytes % 10/14/2020 5  2 - 10 % Final     Eosinophils % 10/14/2020 0  0 - 6 % Final     Basophils % 10/14/2020 1  0 - 2 % Final     Immature Granulocyte % 10/14/2020 0  <=0 % Final     Neutrophils Absolute 10/14/2020 4.6  2.0 - 7.7 thou/uL Final     Lymphocytes Absolute 10/14/2020 3.9  0.8 - 4.4 thou/uL Final     Monocytes Absolute 10/14/2020 0.5  0.0 - 0.9 thou/uL Final     Eosinophils Absolute 10/14/2020 0.0  0.0 - 0.4 thou/uL Final     Basophils Absolute 10/14/2020 0.1  0.0 - 0.2 thou/uL Final     Immature Granulocyte Absolute 10/14/2020 0.0  <=0.0 thou/uL Final     Troponin I 10/14/2020 <0.01  0.00 - 0.29 ng/mL Final     SYSTOLIC BLOOD PRESSURE 10/14/2020 119  mmHg Final     DIASTOLIC BLOOD PRESSURE 10/14/2020 61  mmHg Final     VENTRICULAR RATE 10/14/2020 73  BPM Final     ATRIAL RATE 10/14/2020 73  BPM Final     P-R INTERVAL 10/14/2020 174  ms Final     QRS DURATION 10/14/2020 102  ms Final     Q-T INTERVAL 10/14/2020 412  ms Final     QTC CALCULATION (BEZET) 10/14/2020 453  ms Final     P Axis 10/14/2020 35  degrees Final     R AXIS 10/14/2020 62  degrees Final     T AXIS 10/14/2020 36  degrees Final     MUSE DIAGNOSIS 10/14/2020    Final                    Value:Normal sinus rhythm  Normal ECG  When compared with ECG of 14-OCT-2020 08:18,  No significant change was found  Confirmed by SEE ED PROVIDER NOTE FOR, ECG INTERPRETATION (4000),  SAMIRA LANG ([id number]) on 10/14/2020 1:24:24 PM       D-Dimer, Quant 10/14/2020 <0.27  <=0.50 FEU ug/mL Final   Office Visit on 10/14/2020   Component Date Value Ref Range Status     VENTRICULAR RATE 10/14/2020 78  BPM Final     ATRIAL RATE 10/14/2020 78  BPM Final     P-R INTERVAL 10/14/2020 170  ms Final     QRS DURATION 10/14/2020 104  ms Final     Q-T INTERVAL 10/14/2020 390  ms Final     QTC CALCULATION (NIKHIL)  10/14/2020 444  ms Final     P Axis 10/14/2020 18  degrees Final     R AXIS 10/14/2020 60  degrees Final     T AXIS 10/14/2020 4  degrees Final     MUSE DIAGNOSIS 10/14/2020    Final                    Value:Normal sinus rhythm  Normal ECG  When compared with ECG of 02-OCT-2020 21:05,  No significant change was found  Confirmed by GUILLERMO YUEN MD LOC:JN ([id number]) on 10/14/2020 4:53:25 PM       Xr Chest 2 Views    Result Date: 10/14/2020  EXAM: XR CHEST 2 VIEWS LOCATION: [facility] DATE/TIME: 10/14/2020 8:34 AM INDICATION: Chest pain. COMPARISON: 10/02/2020.     Possible mild perihilar predominant airway thickening or bronchitis. Correlate for symptoms. Otherwise, no significant change or obvious acute process. Lungs are clear.     Xr Chest 2 Views    Result Date: 10/2/2020  EXAM: XR CHEST 2 VIEWS LOCATION: [facility] DATE/TIME: 10/2/2020 10:49 PM INDICATION: cp COMPARISON: None.     Negative chest.    Xr Abdomen 2 Views    Result Date: 10/7/2020  EXAM DATE:         10/07/2020 EXAM: X-RAY ABDOMEN, 2 VIEWS LOCATION: [facility] DATE/TIME: 10/7/2020 10:15 AM INDICATION: Lower abdominal pain, upper abdominal pain, nausea, evaluate for constipation / obstruction COMPARISON: None. IMPRESSION: Negative abdomen. Bowel gas pattern is normal. Minimal air in the colon. No significant colonic stool. Nothing for obstruction or free air. No evidence for renal stones.     Ct Abdomen Pelvis With Oral With Iv Contrast    Result Date: 10/7/2020  EXAM DATE:         10/07/2020 EXAM: CT ABDOMEN, PELVIS WITH CONTRAST LOCATION: [facility] DATE/TIME: 10/7/2020 11:00 AM INDICATION: Lower abdominal pain, upper abdominal pain, nausea, evaluate for constipation / obstruction COMPARISON: Radiograph 10/7/2020 TECHNIQUE: CT scan of the abdomen and pelvis was performed following injection of IV contrast. Multiplanar reformats were obtained. Dose reduction  techniques were used. CONTRAST: 100 ml Isovue 370 was administered via iv from a single use vial. SPR istat CR= 1.0 mg/dl, eGFR=62. FINDINGS: LOWER CHEST: Small left Bochdalek hernia. HEPATOBILIARY: Hepatic steatosis. PANCREAS: Normal. SPLEEN: Normal. ADRENAL GLANDS: Normal. KIDNEYS/BLADDER: Normal. BOWEL: No obstruction or inflammatory change. Normal appendix. LYMPH NODES: Normal. VASCULATURE: Unremarkable. PELVIC ORGANS: Normal. MUSCULOSKELETAL: Mild degenerative disc disease at L4-5 and L5-S1. IMPRESSION: 1.  No acute abnormality in the abdomen or pelvis. 2.  Hepatic steatosis.      Assessment & Plan:  1. Pharyngitis, unspecified etiology. Unlikely bacterial due to current antibiotic regimen. He has had some face to face interactions in healthcare and has covid symptoms. Referral sent for covid screening. Discussed symptomatic care: acetaminophen, ibuprofen. OK to continue the bendadryl he has found helpful. If any breathing problems, or unable to swallow,he is to go to the ER immediately.      Order Summary                                                      1. Pharyngitis, unspecified etiology  Symptomatic COVID-19 Virus (CORONAVIRUS) PCR   2. Fatigue, unspecified type  Symptomatic COVID-19 Virus (CORONAVIRUS) PCR      Future Appointments   Date Time Provider Department Center   10/20/2020  3:20 PM Shahla Joaquin MD [facility] OB [facility]   10/26/2020  7:40 AM Shahla Joaquin MD [facility] OB C Clinic   10/27/2020  7:00 AM JN NM CH 1 JN NM JN   10/27/2020  7:15 AM JN NM A JN NM JN   10/27/2020  7:45 AM JN HC NM STRESS 1 JN CTST JN   10/27/2020  9:30 AM JN NM A JN NM JN   11/4/2020 10:30 AM Maya Mace, Diabetes Ed [facility] DIABED [facility]   12/15/2020  7:30 AM JN HCC LAB HCC JN HCC JN       Completed by: Shahla Joaquin M.D., [facility]. 10/20/2020 3:03 PM.  This transcription uses voice recognition software, which may contain typographical errors.  ____________________________  Start call: 3:03  PM   End call: 3:18 PM     Phone call duration:  15 minutes    Shahla Joaquin MD

## 2021-06-12 NOTE — PROGRESS NOTES
Call to inform patient that his hemoglobin A1c is elevated at 7.3, meeting criteria for diabetes diagnosis.  Instructed patient to follow-up with his PCP for further management.

## 2021-06-12 NOTE — TELEPHONE ENCOUNTER
----- Message from Pola Childers MD sent at 10/3/2020  2:10 PM CDT -----  Please let patient know if his lab results and to follow up with PCP to discuss optimization of care for high cholesterol and diabetes.

## 2021-06-12 NOTE — PROGRESS NOTES
Assessment:   --initial educatin for Luan for Type 2 DM  --patient stated that after hearing that he had Type 2 DM, he made drastic changes in his diet: cut out fast food, candy, regular pop, junk food and cut out rice/pasta/bread, other carb foods, He has been having just boiled chicken breast and caesar salad for his meals, he stated that he cut back to one alden/day as of yesterday.  --patient drinking Pedialyte and regular water during the day  --patient stated that weight was 254# at the beginning of October and he decreased to 194# on home scale in this past month.  --he stated that he is feeling weak later in the afternoon, some dizziness when he gets up too fast.   --patient is walking/jogging in his home, 30 minutes total per day, he splits exercise into smaller increments of time.       Blood glucose record 10/28-10/31: patient ran of testing strips  FBS:  94,80,86,86  Pre lunch: 86,98,97  Pre dinner: 93,118,85,84    No medications prescribed for Diabetes     Education:  --reviewed Type 2 DM, SMBG, Basic CHO counting and plate method & reading food labels  --discussed getting adequate nutrition during the day, having regularly scheduled meals, allowing some carbohydrate back in his meals in smaller portions, having fruit as a snack. .  --reviewed s/s of hyper and hypoglycemia, and treatment, BG/A1C goals, healthy eating, snacking and benefits of exercise.        Plan:   1. Check glucose twice daily: fasting and two hours after one of your meals.  2. Take medications daily as prescribed.  3. Add in foods containing carbohydrate in small portions at meals and for snacks.  Read labels for total carbohydrate content when eating out and for food purchased.   4. Continue to exercise per MD discretion.  5. Follow up with educator on 12/4/20    Subjective and Objective:      Luan Morgan is referred by Shahla Joaquin for Diabetes Education.     Lab Results   Component Value Date    HGBA1C 6.7 (H) 10/26/2020                Education:     Monitoring   Meter (per above goals): meter not brought with to the clinic, but readings were given off his cell phone  Monitoring: Assessed and Discussed  BG goals: Assessed, Discussed and Literature provided    Nutrition Management  Nutrition Management: Assessed, Discussed and Literature provided  Weight: Assessed and Discussed  Portions/Balance: Assessed, Discussed and Literature provided  Carb ID/Count: Assessed, Discussed and Literature provided  Label Reading: Assessed, Discussed and Literature provided  Heart Healthy Fats: Assessed, Discussed and Literature provided  Menu Planning: Assessed, Discussed and Literature provided  Dining Out: Assessed and Discussed  Physical Activity: Assessed and Discussed    Orals: Assessed and Discussed      Diabetes Disease Process: Assessed, Discussed and Literature provided    Acute Complications: Prevent, Detect, Treat:  Hypoglycemia: Assessed, Discussed and Literature provided  Hyperglycemia: Assessed, Discussed and Literature provided  Sick Days: Not addressed  Driving: Not addressed    Chronic Complications  Foot Care:Not addressed  Skin Care: Not addressed  Eye: Not addressed  ABC: Assessed  Teeth:Not addressed  Goal Setting and Problem Solving: Assessed and Discussed  Barriers: Assessed  Psychosocial Adjustments: Assessed      Time spent with the patient: 60 minutes for diabetes education and counseling.   Previous Education: no  Visit Type:DSMT  Hours Remainin, DSMT 9 and MNT 3      Maya Mace  2020

## 2021-06-12 NOTE — TELEPHONE ENCOUNTER
Pt tested positive for H Pylori and is requesting medication. Please advise. Pt has a future order for COVID testing should she still be tested?

## 2021-06-12 NOTE — TELEPHONE ENCOUNTER
Hemoglobin A1c  Lab Results   Component Value Date    HGBA1C 6.7 (H) 10/26/2020    HGBA1C 7.3 (H) 10/03/2020     Creatinine  Lab Results   Component Value Date    CREATININE 1.03 10/26/2020     Microalbumin:Cr Ratio  Lab Results   Component Value Date    MSHCGMOM 6.0 10/26/2020     TSH  Lab Results   Component Value Date    TSH 2.37 10/22/2020      LDL  Lab Results   Component Value Date    LDLDIRECT 170 (H) 05/11/2018    LDLDIRECT 196 (H) 03/05/2018    LDLCALC 176 (H) 10/26/2020    LDLCALC 196 (H) 10/03/2020

## 2021-06-12 NOTE — PROGRESS NOTES
"URGENT CARE VISIT:    SUBJECTIVE:   Luan Morgan is a 39 y.o. male who presents for chest pain that started at 10 am this morning and has persisted. Pain is \"squeezing\" and still present. Nothing makes it better or worse. He also feels shortness of breath, low pulse, and dizziness. He reports that his pulse dropped to 37 at one point. Treatments tried include nothing with no relief of symptoms. No past medical hx of heart disease.     PMH:   Past Medical History:   Diagnosis Date     Mixed hyperlipidemia 10/3/2020     Type 2 diabetes mellitus (H) 10/3/2020     Allergies: Patient has no known allergies.  Medications:   Current Outpatient Medications   Medication Sig Dispense Refill     alcohol swabs PadM Apply 1 each topically daily. 100 each 3     amoxicillin (AMOXIL) 500 MG tablet Take 2 tablets (1,000 mg total) by mouth 2 (two) times a day for 14 days. 56 tablet 0     blood glucose test strips Use 1 each As Directed daily. Dispense brand per patient's insurance at pharmacy discretion. 100 strip 3     clarithromycin (BIAXIN) 500 MG tablet Take 1 tablet (500 mg total) by mouth 2 (two) times a day for 14 days. For Helicobacter pylori gastritis. 28 tablet 0     lancets 32 gauge Misc Use 1 each As Directed daily. 100 each 3     lansoprazole (PREVACID) 30 MG capsule Take 1 capsule (30 mg total) by mouth 2 (two) times a day for 14 days. 28 capsule 0     omeprazole (PRILOSEC) 20 MG capsule Take 1 capsule (20 mg total) by mouth 2 (two) times a day before meals for 14 days. For Helicobacter pylori gastritis. 28 capsule 0     No current facility-administered medications for this visit.      Social History:   Social History     Tobacco Use     Smoking status: Former Smoker     Smokeless tobacco: Never Used   Substance Use Topics     Alcohol use: Not on file       ROS:   General ROS: negative  Psychological ROS: negative  Respiratory ROS: sob  Cardiovascular ROS: chest pain  Gastrointestinal ROS: no abdominal pain, change in " bowel habits, or black or bloody stools  Musculoskeletal ROS: negative  Neurological ROS: dizziness  Dermatological ROS: negative.     OBJECTIVE:  /82   Pulse 81   Resp 14   SpO2 100%   General: WDWN in NAD  Eyes: Conjunctiva clear  Neck: Supple, non-tender  Cardiac: RRR without murmurs, rubs, or gallops.  Respiratory: LCTAB without adventitious sounds. Non-labored breathing.  Extremities: No peripheral edema or tenderness, peripheral pulses normal  Musculoskeletal: No gross deformities noted, FROM noted in all extremities  Neuro: Normal strength and tone, sensory exam grossly normal,  normal speech and mentation  Integumentary: No suspicious rashes or lesions.     ASSESSMENT:   1. Acute chest pain  Electrocardiogram Perform and Read        PLAN:  Patient Instructions   Patient has been seen multiple times for this problem. Extensive lab work up has been done recently and found to be negative. EKG shows normal sinus rhythm today. I do not feel the need to repeat lab testing since this has been an intermittent problem. See your primary care provider for further management. Consider a holter monitor and/or stress test. Seek emergency care if you develop worsening symptoms.  Patient verbalized understanding and is agreeable to plan. The patient was discharged ambulatory and in stable condition.    Dorothea Frey ....................  10/14/2020   9:24 AM

## 2021-06-12 NOTE — TELEPHONE ENCOUNTER
Called and relayed provider's message. Pt stated understanding.     Lena Morgan LPN 10/5/2020 9:18

## 2021-06-12 NOTE — PROGRESS NOTES
"Luan Morgan is a 39 y.o. male who is being evaluated via a billable video visit.      The patient has been notified of following:     \"This video visit will be conducted via a call between you and your physician/provider. We have found that certain health care needs can be provided without the need for an in-person physical exam.  This service lets us provide the care you need with a video conversation.  If a prescription is necessary we can send it directly to your pharmacy.  If lab work is needed we can place an order for that and you can then stop by our lab to have the test done at a later time.    Video visits are billed at different rates depending on your insurance coverage. Please reach out to your insurance provider with any questions.    If during the course of the call the physician/provider feels a video visit is not appropriate, you will not be charged for this service.\"    Patient has given verbal consent to a Video visit? Yes  How would you like to obtain your AVS? AVS Preference: MyChart.  If dropped by the video visit, the video invitation should be sent to: Text to cell phone: [phone]  Will anyone else be joining your video visit? Spouse        ____________________________    Virtual Visit - Video Encounter  [facility]  Family Medicine  Date of Service: 10/8/2020    Subjective:    Chest and abdominal pain  Started 10/1/20    Chest pain started 10/1/2020  upper chest  Burning, stabbing  Brief    Abdominal Pain  Started with Vomitting and diarrhea  Had stomach pain, intestinal pain all night  Burning  Next day bloated, heart burn  Went to ER 10/2  Urgent care 10/3  Urgent care 10/7    Has tried Miralax, prevacid, prilosec  Symptoms are gradually improving    Still has:  Bilateral lower quadrant pain  Low appetite  Tenesmus  No sick contacts    BP Readings from Last 6 Encounters:   10/07/20 149/84   10/03/20 144/88   10/02/20 136/72   10/31/19 119/65   09/30/18 120/72 "   07/08/18 118/70     Objective:  There were no vitals taken for this visit.   GENERAL: sitting comfortably, alert, and in no apparent distress. RESPIRATORY: No retractions, no nasal flaring, overall work of breathing. No audible wheezing, stridor, cough. SKIN: No rashes, bruising or other skin lesions   No visible edema.     I reviewed these labs and imaging studies with Luan and his wife:  Office Visit on 10/07/2020   Component Date Value Ref Range Status     Bilirubin, Total 10/07/2020 1.1* 0.0 - 1.0 mg/dL Final     Bilirubin, Direct 10/07/2020 0.3  <=0.5 mg/dL Final     Protein, Total 10/07/2020 8.8* 6.0 - 8.0 g/dL Final     Albumin 10/07/2020 4.9  3.5 - 5.0 g/dL Final     Alkaline Phosphatase 10/07/2020 73  45 - 120 U/L Final     AST 10/07/2020 65* 0 - 40 U/L Final     ALT 10/07/2020 124* 0 - 45 U/L Final     Lipase 10/07/2020 20  0 - 52 U/L Final     WBC 10/07/2020 9.8  4.0 - 11.0 thou/uL Final     RBC 10/07/2020 5.79  4.40 - 6.20 mill/uL Final     Hemoglobin 10/07/2020 16.3  14.0 - 18.0 g/dL Final     Hematocrit 10/07/2020 50.0  40.0 - 54.0 % Final     MCV 10/07/2020 86  80 - 100 fL Final     MCH 10/07/2020 28.2  27.0 - 34.0 pg Final     MCHC 10/07/2020 32.6  32.0 - 36.0 g/dL Final     RDW 10/07/2020 13.2  11.0 - 14.5 % Final     Platelets 10/07/2020 352  140 - 440 thou/uL Final     MPV 10/07/2020 9.6  8.5 - 12.5 fL Final     Neutrophils % 10/07/2020 46* 50 - 70 % Final     Lymphocytes % 10/07/2020 48* 20 - 40 % Final     Monocytes % 10/07/2020 5  2 - 10 % Final     Eosinophils % 10/07/2020 0  0 - 6 % Final     Basophils % 10/07/2020 0  0 - 2 % Final     Immature Granulocyte % 10/07/2020 0  <=0 % Final     Neutrophils Absolute 10/07/2020 4.5  2.0 - 7.7 thou/uL Final     Lymphocytes Absolute 10/07/2020 4.7* 0.8 - 4.4 thou/uL Final     Monocytes Absolute 10/07/2020 0.5  0.0 - 0.9 thou/uL Final     Eosinophils Absolute 10/07/2020 0.0  0.0 - 0.4 thou/uL Final     Basophils Absolute 10/07/2020 0.0  0.0 - 0.2  thou/uL Final     Immature Granulocyte Absolute 10/07/2020 0.0  <=0.0 thou/uL Final   Office Visit on 10/03/2020   Component Date Value Ref Range Status     Hemoglobin A1c 10/03/2020 7.3* <=5.6 % Final    Normal <5.7% Prediabete 5.7-6.4% Diabletes 6.5% or higher - adopted from ADA consensus guidelines     Triglycerides 10/03/2020 127  <=149 mg/dL Final     Cholesterol 10/03/2020 262* <=199 mg/dL Final     LDL Calculated 10/03/2020 196* <=129 mg/dL Final     HDL Cholesterol 10/03/2020 41  >=40 mg/dL Final     Patient Fasting > 8hrs? 10/03/2020 No   Final   Admission on 10/02/2020, Discharged on 10/02/2020   Component Date Value Ref Range Status     VENTRICULAR RATE 10/02/2020 107  BPM Final     ATRIAL RATE 10/02/2020 107  BPM Final     P-R INTERVAL 10/02/2020 178  ms Final     QRS DURATION 10/02/2020 106  ms Final     Q-T INTERVAL 10/02/2020 336  ms Final     QTC CALCULATION (BEZET) 10/02/2020 448  ms Final     P Axis 10/02/2020 28  degrees Final     R AXIS 10/02/2020 81  degrees Final     T AXIS 10/02/2020 -2  degrees Final     MUSE DIAGNOSIS 10/02/2020    Final                    Value:Sinus tachycardia  Abnormal QRS-T angle, consider primary T wave abnormality  Abnormal ECG  No previous ECGs available  Confirmed by SEE ED PROVIDER NOTE FOR, ECG INTERPRETATION ([id number]),  MAIK OROZCO ([id number]) on 10/3/2020 6:38:11 AM       Troponin I 10/02/2020 <0.01  0.00 - 0.29 ng/mL Final     BNP 10/02/2020 <10  0 - 35 pg/mL Final     Sodium 10/02/2020 136  136 - 145 mmol/L Final     Potassium 10/02/2020 3.6  3.5 - 5.0 mmol/L Final     Chloride 10/02/2020 101  98 - 107 mmol/L Final     CO2 10/02/2020 23  22 - 31 mmol/L Final     Anion Gap, Calculation 10/02/2020 12  5 - 18 mmol/L Final     Glucose 10/02/2020 159* 70 - 125 mg/dL Final     Calcium 10/02/2020 9.1  8.5 - 10.5 mg/dL Final     BUN 10/02/2020 20  8 - 22 mg/dL Final     Creatinine 10/02/2020 1.02  0.70 - 1.30 mg/dL Final     GFR MDRD Af Amer 10/02/2020 >60  >60  mL/min/1.73m2 Final     GFR MDRD Non Af Amer 10/02/2020 >60  >60 mL/min/1.73m2 Final     D-Dimer, Quant 10/02/2020 <0.27  <=0.50 FEU ug/mL Final     WBC 10/02/2020 11.6* 4.0 - 11.0 thou/uL Final     RBC 10/02/2020 5.46  4.40 - 6.20 mill/uL Final     Hemoglobin 10/02/2020 15.2  14.0 - 18.0 g/dL Final     Hematocrit 10/02/2020 47.4  40.0 - 54.0 % Final     MCV 10/02/2020 87  80 - 100 fL Final     MCH 10/02/2020 27.8  27.0 - 34.0 pg Final     MCHC 10/02/2020 32.1  32.0 - 36.0 g/dL Final     RDW 10/02/2020 13.2  11.0 - 14.5 % Final     Platelets 10/02/2020 297  140 - 440 thou/uL Final     MPV 10/02/2020 9.4  8.5 - 12.5 fL Final     Neutrophils % 10/02/2020 45* 50 - 70 % Final     Lymphocytes % 10/02/2020 47* 20 - 40 % Final     Monocytes % 10/02/2020 6  2 - 10 % Final     Eosinophils % 10/02/2020 2  0 - 6 % Final     Basophils % 10/02/2020 0  0 - 2 % Final     Immature Granulocyte % 10/02/2020 1* <=0 % Final     Neutrophils Absolute 10/02/2020 5.2  2.0 - 7.7 thou/uL Final     Lymphocytes Absolute 10/02/2020 5.4* 0.8 - 4.4 thou/uL Final     Monocytes Absolute 10/02/2020 0.7  0.0 - 0.9 thou/uL Final     Eosinophils Absolute 10/02/2020 0.2  0.0 - 0.4 thou/uL Final     Basophils Absolute 10/02/2020 0.0  0.0 - 0.2 thou/uL Final     Immature Granulocyte Absolute 10/02/2020 0.1* <=0.0 thou/uL Final     Xr Chest 2 Views    Result Date: 10/2/2020  EXAM: XR CHEST 2 VIEWS LOCATION: [facility] DATE/TIME: 10/2/2020 10:49 PM INDICATION: cp COMPARISON: None.     Negative chest.    Xr Abdomen 2 Views    Result Date: 10/7/2020  EXAM DATE:         10/07/2020 EXAM: X-RAY ABDOMEN, 2 VIEWS LOCATION: [facility] DATE/TIME: 10/7/2020 10:15 AM INDICATION: Lower abdominal pain, upper abdominal pain, nausea, evaluate for constipation / obstruction COMPARISON: None. IMPRESSION: Negative abdomen. Bowel gas pattern is normal. Minimal air in the colon. No significant colonic stool. Nothing for obstruction or  free air. No evidence for renal stones.     Ct Abdomen Pelvis With Oral With Iv Contrast    Result Date: 10/7/2020  EXAM DATE:         10/07/2020 EXAM: CT ABDOMEN, PELVIS WITH CONTRAST LOCATION: [facility] DATE/TIME: 10/7/2020 11:00 AM INDICATION: Lower abdominal pain, upper abdominal pain, nausea, evaluate for constipation / obstruction COMPARISON: Radiograph 10/7/2020 TECHNIQUE: CT scan of the abdomen and pelvis was performed following injection of IV contrast. Multiplanar reformats were obtained. Dose reduction techniques were used. CONTRAST: 100 ml Isovue 370 was administered via iv from a single use vial. SPR istat CR= 1.0 mg/dl, eGFR=62. FINDINGS: LOWER CHEST: Small left Bochdalek hernia. HEPATOBILIARY: Hepatic steatosis. PANCREAS: Normal. SPLEEN: Normal. ADRENAL GLANDS: Normal. KIDNEYS/BLADDER: Normal. BOWEL: No obstruction or inflammatory change. Normal appendix. LYMPH NODES: Normal. VASCULATURE: Unremarkable. PELVIC ORGANS: Normal. MUSCULOSKELETAL: Mild degenerative disc disease at L4-5 and L5-S1. IMPRESSION: 1.  No acute abnormality in the abdomen or pelvis. 2.  Hepatic steatosis.      Assessment & Plan:  1. Burning chest pain and Bilateral lower quadrant abdominal pain with persistent tenesmus, normal CT. Suspect infectious. Referral sent for Covid-19 testing. Discussed bland diet, fluids.  2. Abnormal LFTs. Hepatic steatosis noted on CT. Suspect fatty liver disease. Recheck LFTs in one month.  3. Diabetes, type 2. New diagnosis. Referral made for diabetes education, testing supplies ordered.  4. Hyperlipidemia, new diagnosis. Discussed lifestyle modification and medication if LDL remains >190.  5. Elevated blood pressure. In the setting of pain. Monitor for resolution.      Order Summary                                                      No diagnosis found.   Future Appointments   Date Time Provider Department Center   10/26/2020  7:40 AM Shahla Joaquin MD [facility]        Completed by: Shahla Joaquin M.D., [facility]. 10/8/2020 9:52 AM.  This transcription uses voice recognition software, which may contain typographical errors.  ____________________________  Start visit: 9:52 AM  End visit: 10:15 AM       Video-Visit Details    Type of service:  Video Visit    Video End Time (time video stopped): 10:15 AM  Originating Location (pt. Location): Home    Distant Location (provider location):  [facility]     Platform used for Video Visit: Yaquelin Joaquin MD

## 2021-06-12 NOTE — PATIENT INSTRUCTIONS - HE
Patient has been seen multiple times for this problem. Extensive lab work up has been done recently and found to be negative. EKG shows normal sinus rhythm today. I do not feel the need to repeat lab testing since this has been an intermittent problem. See your primary care provider for further management. Consider a holter monitor and/or stress test. Seek emergency care if you develop worsening symptoms.

## 2021-06-12 NOTE — TELEPHONE ENCOUNTER
Please tell patient there is no way to tell that over a mychart message.   If he is feeling ill in any way, like feeling chest pain, shortness of breath, and/or trouble breathing and concerned about a heart attack he should go to ED now.

## 2021-06-13 NOTE — PROGRESS NOTES
Adult Physical:    CC:  Well checkup.    HPI:  Patient concerned about white skin changes noted on penile glans.  Present for about a week and a half.  Had some tearing of the medial raphae.  States it has not been itchy.  Patient does have diabetes.  He is not sure if it spreading or if it staying exactly the same.    Also patient noticing a little bit of improvement in throat symptoms with PPI use.  Had been taking a large amount of cola previously.  Now taking PPI consistently.  He does note that he coughs a bit more if he lays down at night.  Tends to wake with this at between 3 and 5 AM.     Yesterday he noted a fairly high blood sugar at about 204 fasting.  This was odd is usually has had fasting sugars less than 100.  Does not think there was any difference in exercise or dietary intake in the preceding 12 hours.      Patient Active Problem List   Diagnosis     Type 2 diabetes mellitus (H)     Dyslipidemia, goal LDL below 70     Nonalcoholic hepatosteatosis     Obesity (BMI 30.0-34.9)     Chest pain     Bochdalek hernia     Obesity (BMI 35.0-39.9) with comorbidity (H)       Past Medical History:  Past Medical History:   Diagnosis Date     Mixed hyperlipidemia 10/3/2020     Sleep apnea      Tenesmus 10/8/2020     Type 2 diabetes mellitus (H) 10/3/2020     Past Surgical History:  Past Surgical History:   Procedure Laterality Date     NO PAST SURGERIES         Medicines:  Outpatient Medications Prior to Visit   Medication Sig Dispense Refill     alcohol swabs PadM Apply 1 each topically daily. 100 each 3     azelastine (ASTELIN) 137 mcg (0.1 %) nasal spray Use in each nostril as directed 30 mL 12     blood glucose test strips Use 1 each As Directed daily. Dispense brand per patient's insurance at pharmacy discretion. 100 strip 3     blood glucose test strips Use 1 each As Directed as needed. Use new strip for each finger poke, two times per day. 100 strip 6     generic lancets Use 1 each As Directed as needed.  Use new needle for each finger poke, two times per day. 100 each 6     lancets 32 gauge Misc Use 1 each As Directed daily. 100 each 3     OMEPRAZOLE ORAL Take by mouth.       No facility-administered medications prior to visit.        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     Employer: NOT EMPLOYED   Social Needs     Financial resource strain: Not on file     Food insecurity     Worry: Not on file     Inability: Not on file     Transportation needs     Medical: Not on file     Non-medical: Not on file   Tobacco Use     Smoking status: Former Smoker     Packs/day: 3.50     Years: 15.00     Pack years: 52.50     Types: Cigarettes     Quit date: 2013     Years since quittin.6     Smokeless tobacco: Never Used   Substance and Sexual Activity     Alcohol use: Never     Binge frequency: Never     Drug use: Never     Sexual activity: Yes     Partners: Female   Lifestyle     Physical activity     Days per week: 0 days     Minutes per session: 0 min     Stress: Not on file   Relationships     Social connections     Talks on phone: Not on file     Gets together: Not on file     Attends Restorationism service: Not on file     Active member of club or organization: Not on file     Attends meetings of clubs or organizations: Not on file     Relationship status: Not on file     Intimate partner violence     Fear of current or ex partner: Not on file     Emotionally abused: Not on file     Physically abused: Not on file     Forced  "sexual activity: Not on file   Other Topics Concern     Not on file   Social History Narrative    Jennifer is a .     Review of Systems:  10 point review of systems completed negative except for issues noted in HPI.    Physical Exam:  /80 (Patient Site: Right Arm, Patient Position: Sitting, Cuff Size: Adult Large)   Pulse 80   Resp 16   Ht 5' 2.25\" (1.581 m)   Wt 196 lb (88.9 kg)   BMI 35.56 kg/m    Gen:   Alert, not distressed  Head:   Normocephalic, without obvious abnormality, atraumatic  Eyes:   PERRL, conjunctiva/corneas clear, EOM's intact  Ears:   Normal tympanic membranes and external ear canals  Nose:    Mucosa normal, no drainage or sinus tenderness  Throat:    No erythema or exudates  Neck:    No adenopathy no nodules in thyroid, normal ROM  Lungs:    Clear to auscultation bilaterally, respirations unlabored  Chest wall:  No tenderness or deformity  Heart:     Regular, normal S1 and S2, no murmur, gallop or rub  Abdomen:  Soft, non-tender, no masses, no organomegaly  Back:    Symmetric, no curvature, ROM normal, no CVA tenderness  Genitalia:  Normal appearance of penile shaft and glans other than some hypopigmentation centered around the medial ventral raphae and glans.  Does not appear to be raised it also is macular.  No significant scaling.  Extremities: Extremities normal, atraumatic, no cyanosis or edema  Skin:   Skin color, texture, turgor normal, no rashes or lesions  Lymph nodes: Cervical, and supraclavicular, nodes normal  Neurologic: CNII-XII intact.   DTRs normal and symmetric.  Symmetric strength and sensation.    Immunizations Due:  None are due    Tobacco Cessation:   Not applicable    Cancer Screening:  Nothing is due    Weight management:   The patient was counseled regarding nutrition and physical activity    Assessment and Plan:  1.  Penile skin hypopigmentation  Differential includes monilia, lichen planus, psoriasis, and/or vitiligo.  I recommend treating for " balanitis with an antifungal.  If not better consider seeing urology and/or dermatology.  - clotrimazole (LOTRIMIN) 1 % cream; Apply topically 2 (two) times a day.  Dispense: 30 g; Refill: 0  - Ambulatory referral to Urology      Patient has sleep apnea referral in progress.  Will double check on helping him get that scheduled.  Also he is scheduled for an upper GI.  This is for the evaluation of reflux.  There has been consideration of having an upper endoscopy with Dr. Snow.  We will continue to assess as he progresses with longer term use of PPI.

## 2021-06-13 NOTE — TELEPHONE ENCOUNTER
----- Message from Maya Mace, Diabetes Ed sent at 12/4/2020 10:00 AM CST -----  Regarding: patient requesting follow up visit with you  Dr. Joaquin,    I met with Luan this morning for a follow up.  All of his FBS and post meal readings are meeting glycemic goals.  He has been extremely restrictive with his diet, so we did discuss nutrition in depth today and sustainability with healthy eating habits.      He did bring up a few concerns that he wanted to be able to discuss with you:  One incident of chest discomfort while exercising  Tingling in his left leg, that led to shooting pain upon standing   Timing of being able to resume strengthening exercises    He is agreeable to a video visit.    I will CC scheduling for assisting with an appointment.    Thank you,    Maya Mace

## 2021-06-13 NOTE — PATIENT INSTRUCTIONS - HE
Take omeprazole 40 mg every morning, 30 minutes before you eat.  Avoid these foods:   fatty, greasy, or oily foods  chocolate  tomatoes and tomato sauce  spices (like onion, pepper, garlic)  citrus like lemon, lime, orange, grapefruit  mint  Avoid these beverages:   alcohol  caffeine  juices (like orange juice, grapefruit juice, tomato juice, apple juice)  coffee  tea  pop  other carbonated beverages  Eat smaller meals.  Don't eat before bedtime.  Avoid tight fitting clothes.  Lose weight.  Avoid tobacco.

## 2021-06-13 NOTE — TELEPHONE ENCOUNTER
I called patient to discuss the results of his esophagram.  He was nonhome but I did leave a message for him to call back into clinic.    Nikunj Snow DO [facility]  [phone]

## 2021-06-13 NOTE — PROGRESS NOTES
Chief Complaint   Patient presents with     Annual Exam     want to check his thyroid, noticed pink mucus when he spits, fasting for labs     Abdominal Pain     low abdominal pain ongoing for 1 month, pain comes and goes     Immunizations     hep a #2     Subjective:  40 y.o. male any concerns as noted above.    Feels a sensation in his throat and neck.  It feels painful.  Painful to touch.  Not painful to swallow.  Has some coughing at times.  Thinks he gets some pinkish sputum.  Not sure if it comes from lung or stomach.  Recently treated for Helicobacter pylori.  Felt better after treatment.  Seem to slide back into a little bit more pain after that.    Chest x-ray 1 month ago with possible perihilar airway thickening.  No contacted tuberculosis that he is aware of.    Patient request colonoscopy for abdominal pain.  Seems to be fairly global in abdomen.  Feels pain if he is hungry.  Feels pain is short while after eating as well.  Denies diarrhea except for 1 day.  That resolved.  Denies constipation.  Has an odd sensation of waking in the middle of the night to defecate.  Otherwise has difficulty having a normal bowel movement during the day.  Stool of a soft, formed consistency.  No blood or melena.    CT scan done for lower abdominal pain in early October.  Findings included small left Bochdalek hernia, hepatic steatosis.  No mention of gallstones  I am unable to review images.    Had stress test as well that was normal.  Recommendation for statin from cardiology but he is not taking that.    Recent diagnosis of type 2 diabetes.  Has controlled A1c with diet and exercise.  BMI is 35 in the obese category.    No herbs or traditional medicines currently being used.    Outpatient Medications Prior to Visit   Medication Sig Dispense Refill     alcohol swabs PadM Apply 1 each topically daily. 100 each 3     blood glucose test strips Use 1 each As Directed daily. Dispense brand per patient's insurance at pharmacy  "discretion. 100 strip 3     blood glucose test strips Use 1 each As Directed as needed. Use new strip for each finger poke, two times per day. 100 strip 6     generic lancets Use 1 each As Directed as needed. Use new needle for each finger poke, two times per day. 100 each 6     lancets 32 gauge Misc Use 1 each As Directed daily. 100 each 3     OMEPRAZOLE ORAL Take by mouth.       rosuvastatin (CRESTOR) 10 MG tablet Take 1 tablet (10 mg total) by mouth daily. 90 tablet 3     aspirin 81 mg chewable tablet Chew 81 mg daily.       No facility-administered medications prior to visit.       Social History     Tobacco Use   Smoking Status Former Smoker     Packs/day: 3.50     Years: 15.00     Pack years: 52.50     Types: Cigarettes     Quit date: 2013     Years since quittin.6   Smokeless Tobacco Never Used      Objective:  /68 (Patient Site: Right Arm, Patient Position: Sitting, Cuff Size: Adult Large)   Pulse 67   Temp 97.5  F (36.4  C) (Oral)   Resp 20   Ht 5' 2\" (1.575 m)   Wt 196 lb (88.9 kg)   BMI 35.85 kg/m    GENERAL: alert, not distressed  EYES: PERRL/EOMI, no scleral icterus, no conjunctival injection  PHARYNX: no erythema or exudates  MOUTH: well hydrated mucosa, no lesions  NECK: no lymphadenopathy or thyroid nodules, but mildly tender  CHEST: clear, no rales, rhonchi, or wheezes  CARDIAC: regular without murmur, gallop, or rub  ABDOMEN: obese, soft, mild to moderate global tenderness, non distended, normal bowel sounds     Assessment and Plan:   Bochdalek hernia  Abdominal pain, generalized  Discussed the nature of this diaphragmatic defect.  Probably congenital.  I think would be extraordinary if this were causing symptoms but I have no other explanation.  May be more in the range of irritable bowel syndrome, antibiotic or PPI side effects, or cholelithiasis.  Evaluate with abdominal ultrasound.  - Ambulatory referral to Gastroenterology  - US Abdomen Complete; Future    Neck pain  No " true mass palpated on exam but does feel some tenderness.  Recommended further evaluation with ultrasound.  - US Neck Limited; Future    Morbid obesity (H)  To new efforts at diet and exercise.    Need for Td vaccine  - Td, Preservative Free (green label)    Need for vaccination against hepatitis A  - Hepatitis A adult 2 dose IM (19 yr & older)

## 2021-06-13 NOTE — PROGRESS NOTES
CHIEF COMPLAINT: Globus Sensation      HISTORY OF PRESENT ILLNESS    Luan was seen at the behest of Shahla Joaquin for throat related symptoms.     Patient patient comes in with a history of throat conditions.  He was seen in the emergency room in October with significant chest pain related to GERD.  He has been on PPIs intermittently for the last several months.  He was diagnosed with H. pylori and has been treated for that.  Other symptoms include occasional ear pain pain with swallowing and difficulty swallowing.  Prior to his visit in October he was drinking up to 3 L of Coke a day.  Since that time since quitting that he is lost approximately 60 pounds.  He does have daytime sleepiness he does have a.m. headaches and has difficulty sleeping at night along with snoring.  He has been told he has had sleep apnea in the past but to my knowledge is not had a formal sleep study.  He is very concerned that he may have cancer today.  He does not think he grinds or clenches at night but he does have ear related pain that radiates down his neck.  RSI is filmed is refilled his performed in the clinic today risk questionnaire is given to the patient he scores a 22 oh points with a heartburn being a severe a 4 out of 5 problem.  3 out of 5 problems include globus sensation breathing difficulties or choking episodes as well as difficulty swallowing food.    11/10/20 Visit:    40 y.o. male any concerns as noted above.     Feels a sensation in his throat and neck.  It feels painful.  Painful to touch.  Not painful to swallow.  Has some coughing at times.  Thinks he gets some pinkish sputum.  Not sure if it comes from lung or stomach.  Recently treated for Helicobacter pylori.  Felt better after treatment.  Seem to slide back into a little bit more pain after that.     REVIEW OF SYSTEMS    Review of Systems: a 10-system review is reviewed at this encounter.  See scanned document.     Patient has no known allergies.       There  were no vitals filed for this visit.        PHYSICAL EXAM:        HEAD: Normal appearance and symmetry:  No cutaneous lesions.      EARS:    Normal TM's bilaterally. Normal auditory canals and external ears.   Right TMJ tender to palpatoin         NOSE:    Dorsum:   straight  Septum: normal  Mucosa:  moist  Inferior turbinates:  swollen       ORAL CAVITY/OROPHARYNX:    Lips:  Normal.  Tongue: normal, midline  Mucosa:   no lesions  Tonsils:  3+ ; obscures soft palate     NECK:  Trachea:  midline.   Thyroid:  normal   Adenopathy:  none       NEURO:   Alert and Oriented    GAIT AND STATION:  normal     RESPIRATORY:   Symmetry and Respiratory effort    PSYCH:   normal mood and affect    SKIN:  warm and dry         FLEX LARYNGOSCOPY:    After obtaining consent, a flexible laryngoscope is used to examine both nasal cavities, the nasopharynx, pharnx, and larynx.      Nasal cavity: ITH 3+  NP: clear  Pharnx: inflamed  BOT:  Hypertrophied  Larynx:  TVCs sharp, mobile, 3+ pachydermia of PC      IMPRESSION:    Encounter Diagnoses   Name Primary?     Globus sensation Yes     Gastroesophageal reflux disease without esophagitis      Nasal congestion      Snoring      Arthralgia of right temporomandibular joint      Morning headache           RECOMMENDATIONS:      Orders Placed This Encounter   Procedures     Ambulatory referral to General Surgery     Referral Priority:   Routine     Referral Type:   Surgical     Referral Reason:   Evaluation and Treatment     Referred to Provider:   Pito Snow DO     Requested Specialty:   General Surgery     Number of Visits Requested:   1     Ambulatory referral to Sleep Studies     Referral Priority:   Routine     Referral Type:   Consultation     Referral Reason:   Evaluation and Treatment     Requested Specialty:   Sleep Medicine     Number of Visits Requested:   1      Medications Ordered   Medications     azelastine (ASTELIN) 137 mcg (0.1 %) nasal spray     Sig: Use in each nostril  as directed     Dispense:  30 mL     Refill:  12     2 sprays each nostril 1-2x daily as needed for nasal congestion (use 1x daily for 1 week to begin)       Assessment I think Reza has multiple things going on.  His fiberoptic exam is certainly consistent with upper airway reflux.  I think a lot of the upper airway inflammation is been due to the large amounts of soda he is consumed in the past.  We need to get him on a steady PPI.  I would like to also be evaluated by her reflux specialist Dr. Peng.  Given his anatomy I am certain I am can in his symptoms and is in his sleep related symptoms I am concerned that he may have some sleep apnea and secondary grinding and TMJ related symptoms.  He also has some nasal congestion so I like to start him on some Astelin nasal spray at bedtime that may relieve some of his some of his airway obstruction.  He like to return for recheck in 2 months.  All questions were answered.  He is agreeable this plan of care.

## 2021-06-13 NOTE — PROGRESS NOTES
"Luan Morgan is a 40 y.o. male who is being evaluated via a billable telephone visit.      The patient has been notified of following:     \"This telephone visit will be conducted via a call between you and your physician/provider. We have found that certain health care needs can be provided without the need for a physical exam.  This service lets us provide the care you need with a short phone conversation.  If a prescription is necessary we can send it directly to your pharmacy.  If lab work is needed we can place an order for that and you can then stop by our lab to have the test done at a later time.    Telephone visits are billed at different rates depending on your insurance coverage. During this emergency period, for some insurers they may be billed the same as an in-person visit.  Please reach out to your insurance provider with any questions.    If during the course of the call the physician/provider feels a telephone visit is not appropriate, you will not be charged for this service.\"    Patient has given verbal consent to a Telephone visit? Yes    What phone number would you like to be contacted at? [phone]    Patient would like to receive their AVS by AVS Preference: E-Mail (Inform patient AVS not encrypted with this option).    Additional provider notes: 40-year-old male with a several month history of fairly significant esophageal burning and epigastric discomfort.  He is actually been to the emergency room for similar complaints.  He states that prior to changing his diet he has noted fairly significant esophageal burning which emanates from the epigastric region and extends up to the nares.  He notices that he currently has reflux when he is laying supine and also has a cough at night which wakes him up in the middle of the night.  Denies any black tarry stool.  He has been seen by ENT, Dr. Manjarrez, who noted fairly irritated oropharyngeal region.  He is also been tested for H. pylori via stool " antigen which was positive.  Subsequently has been treated for this but has yet to have follow-up.  After evaluation of his history and the recent hospital visits the plan will be for an esophagram.  I discussed the pathophysiology of gastroesophageal reflux disease.  I discussed initial work-up including an EGD with an esophagram.  He would like to hold off on the EGD at this time but has agreed to undergo a baseline esophagram.  I will order this and call him with results once complete.  Telephone GERD-HR QL = 51    Phone call duration: 10 minutes    It is my professional judgment, in conjunction with the current COVID-19 pandemic recommended restrictions on elective procedures, that this procedure can safely be deferred until a later date which may be beyond the typical treatment period/window. I have engaged in a discussion with the patient (and family) about the need for this deferral, explained why the procedure can be safely deferred, the potential risks associated with the deferral, and answered all of the patients questions. The patient has also been advised that if there is a change in their condition/symptoms they should notify me, my clinic/facility, and/or seek appropriate medical care. That patient has also been informed that the decision to defer this procedure can be re-evaluated if there is a change in their condition/symptoms. I have also told the patient they have the right to seek a second opinion on the decision to defer their procedure.  Nikunj Snow DO [facility]  [phone]      Konrad Yan, [facility]

## 2021-06-13 NOTE — PROGRESS NOTES
Assessment:   --follow up for Luan  --patient is weighing self 3x/day: he is concerned with lack of weight loss in recent weeks.  --patient is exercising at home 30+ minutes/day, he would like to resume strengthening, will consult with his MD  --patient is hydrating with water during the day, no sugary beverages  --patient is consuming two meals/day: 9am/5pm  1/4 cup brown rice, boiled chicken, salad  Snacks: fruit or yogurt  --last eye and dental exam completed in 2020    Blood glucose record:  FBS: 90,97,93,78,93,97  Post meal: 97,78,147,97,83    Medications prescribed:  NA for Diabetes    Education:  --reviewed BG record, goals for BG and A1C  --in depth discussion on nutrition, adequate caloric intake,  sustainability of healthy diet, heart healthy eating, adding in small amounts of CHO foods with meals/snacks/hydration, healthy weight loss,  exercise routine  --discussed decreasing frequency of weighing to no more than once daily,   --discussed preventative care: ABC's, feet, eyes, teeth, stress/depression, support, resources.      Plan:   1. Test glucose daily: fasting or two hours post meal.  2. Add in 2 snacks between first and second meal: fruit, HB egg, yogurt.  3. Allow 1/2 cup of brown rice or noodles with meals.   4. Continue to exercise 30+ minutes daily.  5. Decrease weighing at home to no more than 1x/day.       Subjective and Objective:      Luan Morgan is referred by Shahla Joaquin for Diabetes Education.     Lab Results   Component Value Date    HGBA1C 6.7 (H) 10/26/2020     Wt Readings from Last 3 Encounters:   12/04/20 197 lb (89.4 kg)   12/03/20 196 lb (88.9 kg)   11/10/20 196 lb (88.9 kg)     Education:     Monitoring   Meter (per above goals): Assessed and Discussed  Monitoring: Assessed and Discussed  BG goals: Assessed and Discussed    Nutrition Management  Nutrition Management: Assessed and Discussed  Weight: Assessed and Discussed  Portions/Balance: Assessed and Discussed  Carb ID/Count:  NA  Label Reading: Assessed and Discussed  Heart Healthy Fats: Assessed and Discussed  Menu Planning: Assessed and Discussed  Dining Out: Not addressed  Physical Activity: Assessed and Discussed      Diabetes Disease Process: Assessed and Discussed    Acute Complications: Prevent, Detect, Treat:  Hypoglycemia: Assessed  Hyperglycemia: Assessed and Discussed  Sick Days: Assessed and Discussed  Driving: Not addressed    Chronic Complications  Foot Care:Assessed and Discussed  Skin Care: Assessed and Discussed  Eye: Assessed, Discussed and last exam   ABC: Assessed and Discussed  Teeth:Assessed, Discussed and last exam   Goal Setting and Problem Solving: Assessed and Discussed  Barriers: Assessed and Discussed  Psychosocial Adjustments: Assessed and Discussed      Time spent with the patient: 45 minutes for diabetes education and counseling.   Previous Education: yes  Visit Type:MNT  Hours Remainin, DSMT 9 and MNT 2.25      Maya Mace  2020

## 2021-06-16 PROBLEM — Q79.0 BOCHDALEK HERNIA: Status: ACTIVE | Noted: 2020-10-19

## 2021-06-16 PROBLEM — E78.5 DYSLIPIDEMIA, GOAL LDL BELOW 70: Chronic | Status: ACTIVE | Noted: 2020-10-03

## 2021-06-16 PROBLEM — E66.811 OBESITY (BMI 30.0-34.9): Status: ACTIVE | Noted: 2020-10-08

## 2021-06-16 PROBLEM — E55.9 VITAMIN D DEFICIENCY: Status: ACTIVE | Noted: 2021-02-09

## 2021-06-16 PROBLEM — E66.01 MORBID OBESITY (H): Status: ACTIVE | Noted: 2020-11-10

## 2021-06-16 PROBLEM — K21.00 GASTROESOPHAGEAL REFLUX DISEASE WITH ESOPHAGITIS WITHOUT HEMORRHAGE: Status: ACTIVE | Noted: 2020-12-17

## 2021-06-16 PROBLEM — R07.9 CHEST PAIN: Status: ACTIVE | Noted: 2020-10-08

## 2021-06-16 PROBLEM — E11.65 TYPE 2 DIABETES MELLITUS WITH HYPERGLYCEMIA, WITHOUT LONG-TERM CURRENT USE OF INSULIN (H): Status: ACTIVE | Noted: 2020-10-03

## 2021-06-16 NOTE — PROGRESS NOTES
Worried about liver kidney diabetes mellitus Hypertension     Cannot sleep.  Wake up peeing.  Low back right and left.  Moves around.  Wakes 3/night.  To urinate.  Large volumes initially.  Smaller volumes in latter urines.    Over a year feeling badly.    Worst sx is back problem.  Lower back.  Few minutes daily.   15-20 min.   Not to legs, no continence issue.  No rash.  Worse if stand too long.  Like cooking 6 minutes.   Better lying down and chiro adjustment.   Actually went down in last two days.    Sometimes tingling in leg. Numb tingling pain all at once. Gone in 3 minutes.  More on lateral legs.  Bilateral at same time.  If sit on toilet longer 6 minutes. Or if sit on  or flat sleeping.  Same on sides or front.  And sometimes to arms.  Six minutes every hour.  4 years.  Sometime both arms same time.  No change with neck or back motion.    The back and urine sx connected.    No fever.  No hematuria.  No discharge. No change in the flow rate.  Start same.   Stopping has been different.  Stop and then urine then stop.    Stools same    Fmh: sisters mom dad.  diabetes mellitus.   Sisters diabetes mellitus outside preg.  Dad cva.  First age 55.  Hx cig, Hypertension.    hosp surg neg  Med neg  nkda  hab neg.  Neg   Fhsh:  five kids.   Not working now.    Used to exercise.  Ovidio thorpe do   Jog   Wts.   Stopped two years ago.  Due to back problem.  Stopped work as back problem.    Never took supplements.    ROS:  Constitutional: denies fever  Weights fluctuate..   Gain.  Vision: occ change in vision   Fluctuates.  ENT: denies cough or congestion  Thyroid: denies unusual fatigue  Resp:occasional shortness of breath  Card: denies palpitations.  occ poke needle sensation  GI: denies vomiting or abnormal stool, melena, hematochezia  : above  Neuro: above  Derm: denies rash  Joints: denies redness or swelling  Endo: above polyuria  Mental health: mood is good  Extremities: no edema  Mobility:  stable    Otherwise negative review of systems    ROS: as noted above    OBJECTIVE:   Vitals:    03/05/18 1312   BP: 108/70   Pulse: 88   Resp: 20   Temp: 97.1  F (36.2  C)      Wt is noted.  No diaphoresis  Eyes: nl eom, anicteric   External ears, nose: nl    Neck: nl nodes, supple, thyroid normal   Lungs: clear to ausc   Heart: regular rhythm  Abd: soft nontender   No cva (renal) tenderness  Neuro: no weakness  Skin scalp mild seborrheic scaling  Joints: uninflamed   No ketotic breath odor noted  Mental: anxious  Ext: nontender calves   Gait: normal    Bmi:37   acanthosis nigricans neck    ASSESSMENT/PLAN:    New pt high risk diabetes mellitus and non specific sxs.  ?prostatic  Intermittent nerve sx ? Pressure ? Hyperventilation      Plan.  Antibiotic trial, labs, follow up one month or per labs    1. Obesity  Comprehensive Metabolic Panel    LDL Cholesterol, Direct    Thyroid Stimulating Hormone (TSH)   2. Acanthosis nigricans  Comprehensive Metabolic Panel   3. Prostatitis  Urinalysis-UC if Indicated    sulfamethoxazole-trimethoprim (SEPTRA DS) 800-160 mg per tablet   4. Erectile dysfunction  LDL Cholesterol, Direct   5. Neuropathy  LDL Cholesterol, Direct    HM2(CBC w/o Differential)   6. Seborrhea capitis  fluocinonide (LIDEX) 0.05 % cream     One month

## 2021-06-16 NOTE — TELEPHONE ENCOUNTER
RN cannot approve Refill Request    RN can NOT refill this medication med is not covered by policy/route to provider. Last office visit: 10/26/2020 Shahla Joaquin MD Last Physical: Visit date not found Last MTM visit: Visit date not found Last visit same specialty: 10/26/2020 Shahla Joaquin MD.  Next visit within 3 mo: Visit date not found  Next physical within 3 mo: Visit date not found      Barbara Ruelas, [facility] Triage/Med Refill 4/12/2021    Requested Prescriptions   Pending Prescriptions Disp Refills     ergocalciferol (ERGOCALCIFEROL) 1,250 mcg (50,000 unit) capsule [Pharmacy Med Name: VITAMIN D2 50,000IU (ERGO) CAP RX] 8 capsule 1     Sig: TAKE 1 CAPSULE BY MOUTH EVERY WEEK FOR 8 DOSES       There is no refill protocol information for this order

## 2021-06-16 NOTE — TELEPHONE ENCOUNTER
LMTCB #1 to see if patient was interested in getting the COVID-19 Vaccine at [facility] on 4/17 if patient hasn't already.    If patient got it, please notate this information down and re-route it to the provider pool. Thanks.    Name of vaccine:  Place of vaccination:  Date of 1st dose:  Date of 2nd dose:  Lot #: (patient may or may not have this)

## 2021-06-16 NOTE — PROGRESS NOTES
Luan Morgan is a 40 y.o. male who is being evaluated via a billable video visit.      How would you like to obtain your AVS? MyChart.  If dropped from the video visit, the video invitation should be resent by: Text to cell phone: [phone]   Will anyone else be joining your video visit? No    ____________________________    Virtual Visit - Video Encounter  [facility]  Family Medicine  Date of Service: 4/16/2021    Subjective:    Pain between Penis and Testicles  No dysuria  No discharge  Pain started last week Thursday - comes and goes.  Pain is located on the ventral shaft and perineum. Not worse with urination.  Not concerned about STI.    Throat discomfort  Neck massage helps  ENT: reflux  Took omeprazole, then stopped. Eating better now.  Seeing chiropractor    Leg pain  Still having numbness pain  Chiropractor; massage + acupuncture. Sciatica.  Running out of chiropractor visits per insurance    Hyperlipidemia/Obesity  Still gaining weight 194 to 202. Eating salad and exercising.   Gaining muscle in legs  Fluid retention - hands and feet - goes away after HIIT workout    Objective:     GENERAL: sitting comfortably, alert, and in no apparent distress. RESPIRATORY: No retractions, no nasal flaring, overall work of breathing. No audible wheezing, stridor, cough. SKIN: No rashes, bruising or other skin lesions   No visible edema.  Lab on 04/15/2021   Component Date Value Ref Range Status     Vitamin D, Total (25-Hydroxy) 04/15/2021 29.0* 30.0 - 80.0 ng/mL Final     Direct LDL 04/15/2021 187* <=129 mg/dl Final     Chlamydia trachomatis, Amplified D* 04/15/2021 Negative  Negative Final     Neisseria gonorrhoeae, Amplified D* 04/15/2021 Negative  Negative Final     No results found.   Assessment & Plan:  1. Acute prostatitis. Urine culture ordered. Start levofloxacin 500 mg daily for 14 days.  2. Dyslipidemia. LDL still markedly elevated with lifestyle change + rosuvastatin 10 mg. Increase  rosuvastatin to 40 mg.  3. Vitamin D deficiency. Continue supplementation.  4. Sciatica. Chiropractic care beneficial, but insurance limitation. Start Physical Therapy.  5. DM2 well controlled with diet.  6. Obesity. Weight climbing, despite excellent lifestyle change. Will continue and if weight climbs further, consider bariatric care.    Order Summary                                                      1. Prostatitis, acute  Culture, Urine    Chlamydia trachomatis & Neisseria gonorrhoeae, Amplified Detection    levoFLOXacin (LEVAQUIN) 500 MG tablet   2. Dyslipidemia, goal LDL below 70  LDL Cholesterol, Direct    rosuvastatin (CRESTOR) 40 MG tablet   3. Vitamin D deficiency  Vitamin D, Total (25-Hydroxy)    ergocalciferol (ERGOCALCIFEROL) 1,250 mcg (50,000 unit) capsule   4. Bilateral sciatica  Ambulatory referral to Chiropractic    Ambulatory referral to Physical Therapy   5. Gastroesophageal reflux disease without esophagitis  omeprazole (PRILOSEC) 40 MG capsule   6. Type 2 diabetes mellitus with hyperglycemia, without long-term current use of insulin (H)     7. Morbid obesity (H)        No future appointments.    Completed by: Shahla Joaquin M.D., [facility]. 4/16/2021 10:04 AM.  This transcription uses voice recognition software, which may contain typographical errors.  ____________________________  Start visit: 10:04 AM  End visit: 10:32 AM  Video-Visit Details    Type of service:  Video Visit    Originating Location (pt. Location): Home    Distant Location (provider location):  [facility]     Platform used for Video Visit: Best [employer]

## 2021-06-16 NOTE — TELEPHONE ENCOUNTER
[facility] [phone]. Postponing task to tomorrow to try again. If patient returns call back, please help patient schedule an appointment per message below. Thanks!

## 2021-06-16 NOTE — TELEPHONE ENCOUNTER
Patient had telephone visit today with Dr. Joaquin. Per Dr Joaquin please help patient schedule appointment for labs tomorrow and COVID vaccine.

## 2021-06-16 NOTE — TELEPHONE ENCOUNTER
Patient has a future Video Visit appointment on 4/15/21 with Dr. Joaquin. Completing task. Offer to help pt schedule for Covid vaccine, but pt declined, pt stated pt would like to speak with Dr. Joaquin first.

## 2021-06-17 NOTE — PROGRESS NOTES
"Luan Morgan is a 40 y.o. male who is being evaluated via a billable telephone visit.      What phone number would you like to be contacted at? [phone]   How would you like to obtain your AVS? AVS Preference: Vale.  ____________________________    Virtual Visit - Telephone Encounter  [facility]  Family Medicine  Date of Service: 5/13/2021    Subjective:    Head + Neck Pain  Feels like headache is related to nerves from the spine  Feels pulling near spine  Felt pop from right ear, near temple  Throbbing pain   Went to walk in care  CT was OK  Medicine helped a lot  Still has some pain - just a tiny bit    On top of scalp, there were scratch marks  Were worried about infection.    Constipation + Perineal pain  Constipation meds not helping at all  In the morning stool is solid and perfect. Narrower caliber by later in day.  BM frequency: 3 times yesterday, most days once.  No pain now - abdomen, perineum, or with BM.    Butt cheek and butt bone hurt with sitting. Radiates to upper thigh.    Only with sitting.  No numbness. No urinary.  This started a long time ago, in October.   Massage helps with butt pain and normal stool.   This happens at the same time as smaller caliber stools.  1\" wide and pinky finger.    Objective:  There were no vitals taken for this visit.   Speech is normal  Patient is calm  No visits with results within 1 Week(s) from this visit.   Latest known visit with results is:   Lab on 04/15/2021   Component Date Value Ref Range Status     Vitamin D, Total (25-Hydroxy) 04/15/2021 29.0* 30.0 - 80.0 ng/mL Final     Direct LDL 04/15/2021 187* <=129 mg/dl Final     Culture 04/15/2021 No Growth   Final     Chlamydia trachomatis, Amplified D* 04/15/2021 Negative  Negative Final     Neisseria gonorrhoeae, Amplified D* 04/15/2021 Negative  Negative Final     Ct Head Without Contrast    Result Date: 5/6/2021  EXAM DATE:         05/06/2021 EXAM: CT HEAD WITHOUT CONTRAST " LOCATION: [facility] DATE/TIME: 5/6/2021 3:15 PM INDICATION: Headache, acute, normal neuro exam; abrupt onset severe right side headache; new tender soft tissue swelling over right parietal region COMPARISON: None. TECHNIQUE: Routine CT Head without IV contrast. Multiplanar reformats. Dose reduction techniques were used. FINDINGS: INTRACRANIAL CONTENTS: No intracranial hemorrhage, extraaxial collection, or mass effect.  No CT evidence of acute infarct. Normal parenchymal attenuation. Normal ventricles and sulci, without hydrocephalus. VISUALIZED ORBITS/SINUSES/MASTOIDS: No intraorbital abnormality. No paranasal sinus mucosal disease. No middle ear or mastoid effusion. BONES/SOFT TISSUES: No acute abnormality. The soft tissues of the right parietal scalp are unremarkable. The temporalis muscles appear grossly symmetric. IMPRESSION: 1.  No acute intracranial abnormality. 2.  The soft tissues of the right parietal scalp are unremarkable, without localized inflammation or fluid collection. The temporalis muscles appear grossly symmetric.      Assessment & Plan:  1. Headache + cellulitis. Mostly resolved. CT unremarkable.  2. Change in stool caliber, painless. Intermittent and associated with pain over buttocks. Referral to GI to assess for pathology. Suspect pelvic floor dysfunction. Discussed referral to pelvic floor PT if GI evaluation is normal.   3. DM2 + hyperlipidemia - will come in for lab only visit to follow up.      Order Summary                                                      1. Abnormal stool caliber  Ambulatory referral to Gastroenterology   2. Dyslipidemia, goal LDL below 70  Lipid Cascade   3. Nonalcoholic hepatosteatosis  Comprehensive Metabolic Panel   4. Type 2 diabetes mellitus with hyperglycemia, without long-term current use of insulin (H)  Glycosylated Hemoglobin A1c   5. Vitamin D deficiency  Vitamin D, Total (25-Hydroxy)      No future appointments.     Completed by: Shahla Joaquin M.D., [facility]. 5/13/2021 9:57 AM.  This transcription uses voice recognition software, which may contain typographical errors.  ____________________________  Start call: 9:57 AM   End call: 10:13 AM   Phone call duration: 16 minutes

## 2021-06-17 NOTE — PROGRESS NOTES
Luan Morgan is a 40 y.o. male who is being evaluated via a billable video visit.      How would you like to obtain your AVS? MyChart.  If dropped from the video visit, the video invitation should be resent by: Text to cell phone: [phone]   Will anyone else be joining your video visit? No    ____________________________    Virtual Visit - Video Encounter  [facility]  Family Medicine  Date of Service: 5/7/2021    Subjective:    Constipation  Narrow stool - like pinky finger  Started - two weeks ago, Wednesday  No pain with BM, feels normal  Two days ago, stool was round and solid - now back to sachin  Consistency: shredded, smooth  Frequency: once a day (usually twice a day)  Color: blood is present (when wiping and in toilet) bright red on outside, hemorrhoid present but not symptomatic  6 year old son, Elizabeth has the same problem    Perineal pain   Went away, still a little left.   Levofloxacin seemed to really help. Seems to make his speech slurred.   Levofloxacin seems to make heart go fast/slow    Lower back pain   Laying down only   Both sides l>r      Objective:  There were no vitals taken for this visit.   GENERAL: sitting comfortably, alert, and in no apparent distress. RESPIRATORY: No retractions, no nasal flaring, overall work of breathing. No audible wheezing, stridor, cough. SKIN: No rashes, bruising or other skin lesions   No visible edema.  No visits with results within 1 Week(s) from this visit.   Latest known visit with results is:   Lab on 04/15/2021   Component Date Value Ref Range Status     Vitamin D, Total (25-Hydroxy) 04/15/2021 29.0* 30.0 - 80.0 ng/mL Final     Direct LDL 04/15/2021 187* <=129 mg/dl Final     Culture 04/15/2021 No Growth   Final     Chlamydia trachomatis, Amplified D* 04/15/2021 Negative  Negative Final     Neisseria gonorrhoeae, Amplified D* 04/15/2021 Negative  Negative Final     No results found.   Assessment & Plan:  1. Constipation with  painless bleeding. Likely due to internal hemorrhoids. Treat with senna + docusate, fluids, fiber, activity. If it does not resolve discussed importance of follow up to assess for mass/malignancy.  2. Prostatitis, symptoms significantly improved with levofloxacin. Continue for duration of six weeks.       Order Summary                                                      1. Internal hemorrhoids  senna-docusate (SENNOSIDES-DOCUSATE SODIUM) 8.6-50 mg tablet   2. Prostatitis, acute  levoFLOXacin (LEVAQUIN) 500 MG tablet      Future Appointments   Date Time Provider Department Center   5/13/2021 10:00 AM Shahla Joaquin MD [facility]       Completed by: Shahla Joaquin M.D., [facility]. 5/7/2021 9:37 AM.  This transcription uses voice recognition software, which may contain typographical errors.  ____________________________  Start visit: 10:09 AM   End visit: 10:36 AM     Video-Visit Details    Type of service:  Video Visit    Originating Location (pt. Location): Home    Distant Location (provider location):  [facility]     Platform used for Video Visit: QBE

## 2021-06-17 NOTE — PATIENT INSTRUCTIONS - HE
Alternate ice and heat to painful muscles in neck and head  Toradol was given in he office so no Ibuprofen until tomorrow - then you can take 600mg with food every 6 hours  Acetaminophen (tylenol) 500-1000mg every 6-8 hours may be taken in between ibuprofen if needed for pain. May start this now.   Cyclobenzaprine 5-10 mg every 8 hours as needed for muscle spasms. Start with 5mg. May increase to 10 if needed - this will increase the drowsiness.   Cephalexin three times a day for 7-10 days for possible infection.    Watch for worse pain, vision changes, rash on the scalp and be seen or notify our provider if that is the case.   Return if no improvement in a couple days.

## 2021-06-18 NOTE — PATIENT INSTRUCTIONS - HE
Patient Instructions by Jericho Manjarrez MD at 11/24/2020 11:40 AM     Author: Jericho Manjarrez MD Service: -- Author Type: Physician    Filed: 11/24/2020 11:47 AM Encounter Date: 11/24/2020 Status: Signed    : Jericho Manjarrez MD (Physician)       Acid Suppression Treatment    Start omeprazole 20mg tab, take 2 tabs 30 minutes before breakfast until you symptoms resolve OR you have taken the medication for 2 months  Then decrease to 1 tab in AM for 1 week, then 1 tablet every other day for another week  You are being referred to Dr. Pito Snow, a reflux specialist  I prescribed a nasal spray to use 1-2x daily for nasal congestion  I am referring you for a sleep study to investigate if you have sleep apnea    Patient Education     TMJ Syndrome  The temporomandibular joint (TMJ) is the joint that connects your lower jaw to your head. You can feel it in front of your ears when you open and close your mouth. TMJ disorders involve chronic or recurrent pain in the joint. When treated, symptoms of TMJ disorders usually go away within a few months.  Causes  There is no widely agreed-on cause of TMJ disorders. They have been linked to injury, arthritis, chronic fatigue syndrome, and fibromyalgia. A definite connection has not been shown, though.  Symptoms    Pain in the face, jaw, or neck    Pain with jaw movement or chewing    Locking or catching sensation of the jaw    Clicking, popping, or grinding sounds with movement of the TMJ    Headache    Ear pain  Home care  Modest, nonsurgical treatments are a good first step toward relieving symptoms. Try the approaches described below.    Rest the jaw by avoiding crunchy or hard-to-chew foods. Dont eat hard or sticky candies. Soft foods and liquids are easier on the jaw.    Protect your jaw while yawning. If you need to yawn, put your fist under your chin to prevent your mouth from opening up too wide.    To help relieve pain, try applying hot or cold packs to the painful  area. Try both hot and cold to find out which works best for you. To make a cold pack, put ice cubes in a plastic bag that seals at the top. Wrap the bag in a clean, thin towel or cloth. Never put ice or an ice pack directly on the skin. If you use hot packs (small towels soaked in hot water), be careful not to burn yourself.    You may take acetaminophen or ibuprofen for pain, unless you were given a different pain medicine. (Note: If you have chronic liver or kidney disease or have ever had a stomach ulcer or gastrointestinal bleeding, talk with your healthcare provider before using these medicines. Also talk to your provider if you are taking medicine to prevent blood clots.) Dont give aspirin to a child younger than age 19 unless directed by the [facility] provider. Taking aspirin can put a child at risk for Reye syndrome. This is a rare but very serious disorder that most often affects the brain and the liver.  Reducing stress  If stress seems to be contributing to your symptoms, try to identify the sources of stress in your life. These arent always obvious. Common stressors include:    Everyday hassles. These include things such as traffic jams, missed appointments, or car trouble.    Major life changes. These can be good, such as a new baby or job promotion. And they can be bad, such as losing a job or losing a loved one.    Overload. The feeling that you have too many responsibilities and can't take care of everything at once.    Helplessness. Feeling like your problems are more than you can solve.  When possible, do something about your sources of stress. See if you can avoid hassles, limit the amount of change in your life at one time, and take breaks when you feel overloaded.  Unfortunately, many stressful situations cannot be avoided. So learning how to manage stress better is very important. Getting regular exercise, eating nutritious, balanced meals, and getting adequate rest all help to make everyday  stress more manageable. Certain techniques are also helpful: relaxation and breathing exercises, visualization, biofeedback, meditation, or simply taking some time out to clear your mind. For more information, talk with your healthcare provider.  Follow-up care  Follow up with your healthcare provider, or as advised. Further testing and additional treatment may be required. If changes to your lifestyle do not improve your symptoms, talk with your healthcare provider about other available therapies. These include bite guards for help with teeth grinding, stress management techniques, and more. If stress is an important factor and does not respond to the above simple measures, talk with your healthcare provider about a referral for stress management.  If X-rays were done, they will be reviewed by a specialist. You will be notified of the results, especially if they affect treatment.  Call 911  Call 911 if any of these occur:    Trouble breathing or swallowing, wheezing    Confusion    Extreme drowsiness or trouble awakening    Fainting or loss of consciousness    Rapid heart rate  When to seek medical advice  Call your healthcare provider right away if any of these occur:    Swollen or red face    Pain gets worse    Neck, mouth, tooth, or throat pain gets worse    Fever of 100.4 F (38 C) or higher, or as directed by your healthcare provider  Date Last Reviewed: 10/1/2017    1573-8169 [employer]. [address] [place] [address], [place] [address]. All rights reserved. This information is not intended as a substitute for professional medical care. Always follow your healthcare professional's instructions.               What is a Sleep Study?    Do you often have problems sleeping? Do you feel tired most days of the week? Talk with your healthcare provider or a sleep specialist. He or she may suggest that you have a sleep study. It can help diagnose a sleep disorder such as sleep apnea or narcolepsy. During the  study, a special machine is used to monitor your sleep.  Who needs a sleep study?  If you have sleep problems that last longer than a few weeks, you may need a sleep study. It is important to get tested for sleep disorders. Untreated sleep disorders raise your risk for heart failure, high blood pressure, stroke, diabetes, and depression. Talk with your healthcare provider. Be prepared to answer questions about your health history. Try to keep a daily sleep diary for a week or 2. Write down the time you go to bed, the time you wake up, and anything that seems to affect your sleep. Then your healthcare provider can refer you to a sleep specialist and recommend a sleep study.  Monitoring your sleep  Your sleep can be monitored at a sleep clinic or at your home. In either case, your healthcare provider will discuss the results with you at a future visit:    At a sleep clinic. Most sleep studies are done at a sleep clinic or a sleep lab. In many cases, you will need to stay overnight. You will sleep in a private room, much like a hotel or hospital room. A family member or a friend can come along. But he or she cant stay overnight. Most people dont have trouble sleeping during the study. In the morning you can go home. Sometimes you may be asked to stay at the lab the next day for a daytime nap study.    At home. At times, a sleep study can be done at home. A home sleep study provides most of the same information as a study done at a clinic. A special computer is loaned to you by a sleep clinic or a medical supplier. You will be given instructions on how to use it. Or someone may come to your home to help. Before bedtime, the computer is turned on to monitor your sleep all night. In the morning, you return the computer.  Date Last Reviewed: 8/1/2017 2000-2019 The [employer]. [address] [place] [address], [place] [address]. All rights reserved. This information is not intended as a substitute for professional  medical care. Always follow your healthcare professional's instructions.             Lifestyle changes:    Avoid eating 2-3 hours before bedtime.   You may find it helpful to elevate the head of your bed.     Avoid following foods that are likely to trigger acid reflux:    Coffee or tea (try LOW ACID coffee or herbal tea)  Anything thats fizzy or has caffeine in it  Alcohol   Citrus fruits, such as oranges and gene  Tomato based foods (salsa, pizza, lasanga)  Chocolate   Mint or peppermint  Fatty foods (ice cream)  Spicy foods  Onions and garlic

## 2021-06-18 NOTE — PATIENT INSTRUCTIONS - HE
Patient Instructions by Pola Childers MD at 10/3/2020 10:10 AM     Author: Pola Childers MD Service: -- Author Type: Physician    Filed: 10/3/2020 11:21 AM Encounter Date: 10/3/2020 Status: Addendum    : Pola Childers MD (Physician)    Related Notes: Original Note by Pola Childers MD (Physician) filed at 10/3/2020 11:20 AM         Patient Education     Medicines for Acid Reflux  Your healthcare provider has told you that you have acid reflux. This condition causes stomach acid to wash up into your throat. For most people, acid reflux is troubling but not dangerous. But left untreated, it can sometimes damages the esophagus. Medicines can help control acid reflux and limit your risk of future problems.  Medicines for acid reflux  Your healthcare provider may prescribe medicine to help treat your acid reflux. Medicine will be based on your symptoms and any test results. Your provider will explain how to take your medicine. You will also be told about possible side effects.  Reducing stomach acid  Your provider may suggest antacids that you can buy over the counter. Antacids can give fast relief. Or you may be told to take a type of medicine called H2 blockers. These are available over-the-counter and by prescription (for higher doses).  Blocking stomach acid  In more severe cases, your healthcare provider may suggest stronger medicines such as proton pump inhibitors (PPIs). These keep the stomach from making acid. They are often prescribed for long-term use.  Other medicines  In some cases, medicines to reduce or block stomach acid may not work. Then you may be switched to another type of medicine that helps your stomach empty better.  Date Last Reviewed: 6/1/2019 2000-2019 The [employer]. [address] [place] [address]. All rights reserved. This information is not intended as a substitute for professional medical care. Always follow your healthcare professional's instructions.           Patient  Education     Tips to Control Acid Reflux    To control acid reflux, youll need to make some basic diet and lifestyle changes. The simple steps outlined below may be all youll need to ease discomfort.  Watch what you eat    Don't have fatty foods or spicy foods.    Eat fewer acidic foods, such as citrus and tomato-based foods. These can increase symptoms.    Limit drinking alcohol, caffeine, and fizzy beverages. All increase acid reflux.    Try limiting chocolate, peppermint, and spearmint. These can make acid reflux worse in some people.    Watch when you eat    Don't lie down for 3 hours after eating.    Don't snack before going to bed.    Raise your head  Raising your head and upper body by 4 to 6 inches helps limit reflux when youre lying down. Put blocks under the head of your bed frame or a wedge under your mattress to raise it.  Other changes    Lose weight, if you need to    Dont exercise near bedtime    Don't wear tight-fitting clothes    Limit aspirin and ibuprofen    Stop smoking    Date Last Reviewed: 7/1/2016 2000-2019 The [employer]. [address] [place] [address]. All rights reserved. This information is not intended as a substitute for professional medical care. Always follow your healthcare professional's instructions.          Patient Education     GERD (Adult)    The esophagus is a tube that carries food from the mouth to the stomach. A valve at the lower end of the esophagus prevents stomach acid from flowing upward. When this valve doesn't work properly, stomach contents may repeatedly flow back up (reflux) into the esophagus. This is called gastroesophageal reflux disease (GERD). GERD can irritate the esophagus. It can cause problems with swallowing or breathing. In severe cases, GERD can cause recurrent pneumonia or other serious problems.  Symptoms of reflux include burning, pressure or sharp pain in the upper abdomen or mid to lower chest. The pain can spread to the  "neck, back, or shoulder. There may be belching, an acid taste in the back of the throat, chronic cough, or sore throat or hoarseness. GERD symptoms often occur during the day after a big meal. They can also occur at night when lying down.   Home care  Lifestyle changes can help reduce symptoms. If needed, medicines may be prescribed. Symptoms often improve with treatment, but if treatment is stopped, the symptoms often return after a few months. So most persons with GERD will need to continue treatment.  Lifestyle changes    Limit or avoid fatty, fried, and spicy foods, as well as coffee, chocolate, mint, and foods with high acid content such as tomatoes and citrus fruit and juices (orange, grapefruit, lemon).    Dont eat large meals, especially at night. Frequent, smaller meals are best. Do not lie down right after eating. And dont eat anything 3 hours before going to bed.    Avoid drinking alcohol and smoking. As much as possible, stay away from second hand smoke.    If you are overweight, losing weight will reduce symptoms.     Avoid wearing tight clothing around your stomach area.    If your symptoms occur during sleep, use a foam wedge to elevate your upper body (not just your head.) Or, place 4\" blocks under the head of your bed.  Medicines  If needed, medicines can help relieve the symptoms of GERD and prevent damage to the esophagus. Discuss a medicine plan with your healthcare provider. This may include one or more of the following medicines:    Antacids to help neutralize the normal acids in your stomach.    Acid blockers (H2 blockers) to decrease acid production.    Acid inhibitors (PPIs) to decrease acid production in a different way than the blockers. They may work better, but can take a little longer to take effect.  Take an antacid 30-60 minutes after eating and at bedtime, but not at the same time as an acid blocker.  Try not to take medicines such as ibuprofen and aspirin. If you are taking aspirin " for your heart or other medical reasons, talk to your healthcare provider about stopping it.  Follow-up care  Follow up with your healthcare provider or as advised by our staff.  When to seek medical advice  Call your healthcare provider if any of the following occur:    Stomach pain gets worse or moves to the lower right abdomen (appendix area)    Chest pain appears or gets worse, or spreads to the back, neck, shoulder, or arm    Frequent vomiting (cant keep down liquids)    Blood in the stool or vomit (red or black in color)    Feeling weak or dizzy    Fever of 100.4 F (38 C) or higher, or as directed by your healthcare provider  Date Last Reviewed: 6/23/2015 2000-2017 [employer]. [address] [place] [address], [place] [address]. All rights reserved. This information is not intended as a substitute for professional medical care. Always follow your healthcare professional's instructions.

## 2021-06-18 NOTE — PROGRESS NOTES
Working out.  Squats.  complains of left knee pretib bump painful. Somewhat better    Working on losing wt with exercise and diet    Obesity denies polyuria  Acanthosis nigricans    No evidence for diabetes mellitus    Hyperlipidemia on Life style modification    abnl liver ? Etiology  No icterus.  Stools nl    Needs refill of scalp steroid    No etoh consumption    ROS: as noted above    OBJECTIVE:   Vitals:    05/11/18 1513   BP: 121/69   Pulse: 76      Wt is noted.  No diaphoresis  Eyes: nl eom, anicteric   External ears, nose: nl    Neck: nl nodes, supple, thyroid normal   Lungs: clear to ausc   Heart: regular rhythm  Abd: soft nontender     No cva (renal) tenderness  Neuro: no weakness  Skin no rash  Joints: uninflamed   No ketotic breath odor noted  Mental: euthymic  Ext: nontender calves   Gait: normal    Bmi:34  Tender mild left pretibial    ASSESSMENT/PLAN:  Neg viral markers with elevated lft.  No obvious etiology.  Given others, ? Steatosis  May need US  educ on patellar tendonitis and aerobic exercise for wt loss  Chronic issues stable/ same treatment.     follow up per results      1. Hyperlipidemia  LDL Cholesterol, Direct   2. Abnormal liver enzymes  Comprehensive Metabolic Panel   3. Tendonitis     4. Obesity     5. Seborrhea capitis  fluocinonide (LIDEX) 0.05 % cream

## 2021-06-18 NOTE — PROGRESS NOTES
Subjective:    Luan Morgan is a 37 y.o. male who presents for evaluation of strep throat exposure.  He started to have a tactile fever and a sore throat yesterday.  He has also had other cough and cold symptoms.  His son was diagnosed with strep throat last week and was treated.  He is feeling better.  2 other children are both complaining of sore throats and are here today with mom and dad.    Patient denies any significant past medical history of other concerning items.    Current Outpatient Prescriptions:      fluocinonide (LIDEX) 0.05 % cream, Apply topically 2 (two) times a day. As needed., Disp: 30 g, Rfl: 5  No current facility-administered medications for this visit.      Objective:   Allergies:  Review of patient's allergies indicates no known allergies.    Vitals:  Vitals:    06/04/18 1358   BP: 138/78   Pulse: 83   Resp: 16   Temp: 98  F (36.7  C)     Body mass index is 38.41 kg/(m^2).    Vital signs reviewed.  General: Patient is alert and oriented x 3, in no apparent distress  Ears: TMs are non-erythematous with good light reflex bilaterally  Throat: Minimal erythema, edema or exudate noted  Lymphatic: no anterior cervical lymph node enlargement  Cardiac: regular rate and rhythm, no murmurs  Pulmonary: lungs clear to auscultation bilaterally, no crackles, rales, rhonchi, or wheezing noted    Results for orders placed or performed in visit on 06/04/18   Rapid Strep A Screen- Throat Swab   Result Value Ref Range    Rapid Strep A Antigen Group A Strep detected (!) No Group A Strep detected, presumptive negative       Assessment and Plan:   1.  Strep pharyngitis.  I reviewed treatment options with patient.  He opted for IM Bicillin.  1.2 million units were injected today.  He will stay home from work or school for the next day.  Continue other symptomatic treatment as well.  Follow-up in 1 week if no better, sooner if worsening.    This dictation uses voice recognition software, which may contain  typographical errors.

## 2021-06-18 NOTE — PATIENT INSTRUCTIONS - HE
Patient Instructions by Shahla Joaquin MD at 4/15/2021 10:00 AM     Author: Shahla Joaquin MD Service: -- Author Type: Physician    Filed: 4/15/2021 10:35 AM Encounter Date: 4/15/2021 Status: Addendum    : Shahla Joaquin MD (Physician)    Related Notes: Original Note by Shahla Joaquin MD (Physician) filed at 4/15/2021 10:33 AM       Prostatitis  Treat with levofloxacin 500 mg daily for 14 days  Leave urine test.    Throat Discomfort  Take omeprazole every morning, if symptoms are present    Lumbar radiculopathy (pinched lumbar nerves)  Chiropractor  Physical Therapy    Weight Gain  Monitor salt intake  Continue healthful diet - make sure you start each meal with eating lean protein    Patient Education     Prostatitis    The prostate gland is located deep inside the body at the base of the bladder. Prostatitis is an inflammation of the prostate gland. This can occur with or without infection. Most cases of prostatitis are long term (chronic). Most do not involve a bacterial infection.    Chronic prostatitis is more common in older men. It is usually an inflammatory condition and not an infection. But, bacterial infection can also cause chronic prostatitis. It can cause pain in the rectum, urethra, bladder, or scrotum. It can also make you unable to fully empty the bladder.  You may urinate often, or have burning with urination. Prostatitis may also cause painful ejaculation and erectile dysfunction.    Sudden onset (acute) prostatitis usually occurs in men younger than 35. It is from a bacterial infection. You may have severe symptoms such as fever, chills, muscle aches, and pain in the area between the scrotum and anus (perineum). You may have a hard time urinating, or have pain or burning when urinating. There may be blood or pus in the urine.  Your healthcare provider may do a culture test on prostate fluids or discharge from the penis. This will help determine if bacteria are the cause. Treatment  can include antibiotics, anti-inflammatory medicine, prostate medicines, and stool softeners.  Home care  These guidelines will help you care for yourself at home:    Rest at home until the fever is gone and you are feeling better.    A hot sitz bath may offer some relief. Fill a tub with 6 inches of hot water. Allow the water to run so you can keep it hot for 10 to 15 minutes.    Drink plenty of fluids. Do not drink alcohol or caffeine until all symptoms are gone.    If your healthcare gives you an antibiotic, take it exactly as you are told. Take it until it is all gone.    Constipation causes straining and pain. Avoid constipation by eating natural laxatives such as prunes, fresh fruits, and whole-grain cereals. If needed, use a mild over-the-counter (OTC) laxative for constipation. An OTC stool softener may be used to keep the stools soft.    If sex is uncomfortable or painful, avoid until symptoms get better.    You may use OTC medicines for pain and fever, unless another medicine was given. If you have chronic liver or kidney disease, talk with your healthcare provider before using these medicines. Also talk with your provider if you've ever had a stomach ulcer or GI bleeding.  Follow-up care  Follow up with your healthcare provider, a urologist, or as advised to be sure you are responding to treatment. Your healthcare provider may want to see you after you finish your antibiotics to be sure the infection has cleared. If a culture was taken, you may call for the results as directed. A culture test can help your healthcare provider know if you are on the correct antibiotic.  Call 911  Call 911 if any of these occur:    Weakness, dizziness, or fainting  When to seek medical advice  Call your healthcare provider right away if any of these occur:    Fever of 100.4 F (38 C) or higher, or as directed by your healthcare provider    Unable to pass urine for 8 hours    Pressure or pain in your bladder gets  worse    Painful swelling of the testicle or scrotum  Date Last Reviewed: 10/1/2016    0478-2655 [employer] [address] [place] [address], [place] [address]. All rights reserved. This information is not intended as a substitute for professional medical care. Always follow your healthcare professional's instructions.

## 2021-06-19 NOTE — LETTER
Letter by Dillon Ravi MD at      Author: Dillon Ravi MD Service: -- Author Type: --    Filed:  Encounter Date: 10/31/2019 Status: Signed         October 31, 2019     Patient: Luan Morgan   YOB: 1980   Date of Visit: 10/31/2019       To Whom It May Concern:    I saw the above patient today. His wife Jennifer Khan is here with him. She will return to work 11/1/19.     If you have any questions or concerns, please don't hesitate to call.    Sincerely,        Electronically signed by Dillon Ravi MD

## 2021-06-20 NOTE — LETTER
Letter by Pedro Stearns MD at      Author: Pedro Stearns MD Service: -- Author Type: --    Filed:  Encounter Date: 10/7/2020 Status: (Other)         October 7, 2020     Patient: Luan Morgan   YOB: 1980   Date of Visit: 10/7/2020       To Whom it May Concern:    Luan Morgan was seen in my clinic on 10/7/2020.  Please excuse his wife's (Jennifer Khan) absence from work today due to this appointment / evaluation.      If you have any questions or concerns, please don't hesitate to call.    Sincerely,         Electronically signed by Pedro Stearns MD

## 2021-06-21 ENCOUNTER — RECORDS - HEALTHEAST (OUTPATIENT)
Dept: ADMINISTRATIVE | Facility: OTHER | Age: 41
End: 2021-06-21

## 2021-06-26 NOTE — PROGRESS NOTES
Progress Notes by Reza Portillo DO at 9/30/2018 10:40 AM     Author: Reza Portillo DO Service: -- Author Type: Physician    Filed: 10/1/2018 10:13 AM Encounter Date: 9/30/2018 Status: Signed    : Reza Portillo DO (Physician)       Chief Complaint   Patient presents with   ? Sore Throat     fever, cold x 3 days        History of Present Illness: Rooming staff notes reviewed.  Chief concern of patient, is patient has ear pain, nasal congestion, and throat pain.  He has also felt feverish.    Review of systems: See history of present illness, otherwise negative.     Current Outpatient Prescriptions   Medication Sig Dispense Refill   ? amoxicillin (AMOXIL) 500 MG capsule Take 2 capsules (1,000 mg total) by mouth 2 (two) times a day for 10 days. 40 capsule 0   ? fluocinonide (LIDEX) 0.05 % cream Apply topically 2 (two) times a day. As needed. 30 g 5     No current facility-administered medications for this visit.      No past medical history on file.   No past surgical history on file.   Social History   Substance Use Topics   ? Smoking status: Former Smoker   ? Smokeless tobacco: Never Used   ? Alcohol use None        No family history on file.    Vitals:    09/30/18 1037   BP: 120/72   Patient Site: Right Arm   Patient Position: Sitting   Cuff Size: Adult Large   Pulse: 94   Resp: 18   Temp: 98.7  F (37.1  C)   TempSrc: Oral   SpO2: 97%   Weight: 208 lb 8 oz (94.6 kg)       EXAM:   General: Vital signs reviewed.  Patient is in no acute appearing distress.  Breathing appears nonlabored.  Patient is alert and oriented ×3.  Tympanic membrane of bilateral ears are dull and injected.  Nasal turbinates are injected and edematous with increased rhinorrhea noted.  There is mild pharyngeal injection without exudate noted.  Neck: supple with tender adenoapthy.  Heart: Heart rate is regular without murmur.  Lungs: Lungs are clear to auscultation with good airflow bilaterally.  Skin: Skin is warm and dry without any  rash noted.  Recent Results (from the past 24 hour(s))   Rapid Strep A Screen-Throat swab   Result Value Ref Range    Rapid Strep A Antigen Group A Strep detected (!) No Group A Strep detected, presumptive negative    Results from exam reviewed with patient, with a positive rapid strep study noted.    Assessment/Plan   1. Throat pain  Rapid Strep A Screen-Throat swab   2. Acute streptococcal pharyngitis  amoxicillin (AMOXIL) 500 MG capsule   3. Bilateral otitis media  amoxicillin (AMOXIL) 500 MG capsule   4. Sinusitis  amoxicillin (AMOXIL) 500 MG capsule       Patient Instructions   Also see info below. Be seen again in 3 days if symptoms are not better, sooner if feeling any worse.      Middle Ear Infection (Adult)  You have an infection of the middle ear, the space behind the eardrum. This is also called acute otitis media (AOM). Sometimes it is caused by the common cold. This is because congestion can block the internal passage (eustachian tube) that drains fluid from the middle ear. When the middle ear fills with fluid, bacteria can grow there and cause an infection. Oral antibiotics are used to treat this illness, not ear drops. Symptoms usually start to improve within 1 to 2 days of treatment.    Home care  The following are general care guidelines:    Finish all of the antibiotic medicine given, even though you may feel better after the first few days.    You may use over-the-counter medicine, such as acetaminophen or ibuprofen, to control pain and fever, unless something else was prescribed. If you have chronic liver or kidney disease or have ever had a stomach ulcer or gastrointestinal bleeding, talk with your healthcare provider before using these medicines. Do not give aspirin to anyone under 18 years of age who has a fever. It may cause severe illness or death.  Follow-up care  Follow up with your healthcare provider, or as advised, in 2 weeks if all symptoms have not gotten better, or if hearing doesn't  go back to normal within 1 month.  When to seek medical advice  Call your healthcare provider right away if any of these occur:    Ear pain gets worse or does not improve after 3 days of treatment    Unusual drowsiness or confusion    Neck pain, stiff neck, or headache    Fluid or blood draining from the ear canal    Fever of 100.4 F (38 C) or as advised     Seizure  Date Last Reviewed: 6/1/2016 2000-2017 [employer]. [address] [place] [address], [place] [address]. All rights reserved. This information is not intended as a substitute for professional medical care. Always follow your healthcare professional's instructions.        Pharyngitis: Strep (Confirmed)    You have had a positive test for strep throat. Strep throat is a contagious illness. It is spread by coughing, kissing or by touching others after touching your mouth or nose. Symptoms include throat pain that is worse with swallowing, aching all over, headache, and fever. It is treated with antibiotic medicine. This should help you start to feel better in 1 to 2 days.  Home care    Rest at home. Drink plenty of fluids to you won't get dehydrated.    No work or school for the first 2 days of taking the antibiotics. After this time, you will not be contagious. You can then return to school or work if you are feeling better.     Take antibiotic medicine for the full 10 days, even if you feel better. This is very important to ensure the infection is treated. It is also important to prevent medicine-resistant germs from developing. If you were given an antibiotic shot, you don't need any more antibiotics.    You may use acetaminophen or ibuprofen to control pain or fever, unless another medicine was prescribed for this. Talk with your healthcare provider before taking these medicines if you have chronic liver or kidney disease. Also talk with your healthcare provider if you have had a stomach ulcer or GI bleeding.    Throat lozenges or sprays help  reduce pain. Gargling with warm saltwater will also reduce throat pain. Dissolve 1/2 teaspoon of salt in 1 glass of warm water. This may be useful just before meals.     Soft foods are OK. Don't eat salty or spicy foods.  Follow-up care  Follow up with your healthcare provider or our staff if you don't get better over the next week.  When to seek medical advice  Call your healthcare provider right away if any of these occur:    Fever of 100.4 F (38 C) or higher, or as directed by your healthcare provider    New or worsening ear pain, sinus pain, or headache    Painful lumps in the back of neck    Stiff neck    Lymph nodes getting larger or becoming soft in the middle    You can't swallow liquids or you can't open your mouth wide because of throat pain    Signs of dehydration. These include very dark urine or no urine, sunken eyes, and dizziness.    Trouble breathing or noisy breathing    Muffled voice    Rash  Prevention  Here are steps you can take to help prevent an infection:    Keep good hand washing habits.    Dont have close contact with people who have sore throats, colds, or other upper respiratory infections.    Dont smoke, and stay away from secondhand smoke.  Date Last Reviewed: 11/1/2017 2000-2017 [employer] [address] [facility], [place] [address]. All rights reserved. This information is not intended as a substitute for professional medical care. Always follow your healthcare professional's instructions.           Reza Portillo,

## 2021-06-26 NOTE — PROGRESS NOTES
Progress Notes by Reza Portillo DO at 7/8/2018 12:00 PM     Author: Reza Portillo DO Service: -- Author Type: Physician    Filed: 7/9/2018  8:26 AM Encounter Date: 7/8/2018 Status: Signed    : Reza Portillo DO (Physician)       Chief Complaint   Patient presents with   ? Leg Swelling     both legs hurt since 7/6/18, right leg is worse, jumped off chair and heared a crack        History of Present Illness: Rooming staff notes reviewed.  Patient states she was standing on a chair change in a light bulb, and then he jumped off a chair onto the ground with subsequent discomfort in the bilateral distal shin regions of legs, being more severe on the right lower extremity.  Since jumping off chair, he has developed swelling throughout the distal one half of lower leg extended into the right ankle region.    Review of systems: See history of present illness, otherwise negative.     Current Outpatient Prescriptions   Medication Sig Dispense Refill   ? fluocinonide (LIDEX) 0.05 % cream Apply topically 2 (two) times a day. As needed. 30 g 5   ? HYDROcodone-acetaminophen 5-325 mg per tablet Take 1-2 tablets by mouth every 6 (six) hours as needed for pain (for pain relief). 15 tablet 0     No current facility-administered medications for this visit.        No past medical history on file.   No past surgical history on file.   Social History     Social History   ? Marital status:      Spouse name: N/A   ? Number of children: N/A   ? Years of education: N/A     Social History Main Topics   ? Smoking status: Former Smoker   ? Smokeless tobacco: Never Used   ? Alcohol use None   ? Drug use: None   ? Sexual activity: Not Asked     Other Topics Concern   ? None     Social History Narrative       History   Smoking Status   ? Former Smoker   Smokeless Tobacco   ? Never Used      Exam:   Blood pressure 118/70, pulse 83, temperature 97.5  F (36.4  C), temperature source Oral, resp. rate 16, weight 205 lb 11.2 oz (93.3  kg), SpO2 98 %.    EXAM:   General: Vital signs reviewed. Patient is in some distress due to lower extremity discomfort, especially right lower extremity. Breathing is non labored appearing. Patient is alert and oriented x 3.   Lower extremity examination is notable for tenderness over the distal shin regions bilaterally, being more significant on the right side.  No areas of discoloration is noted.  He has approximately 2 mm of edema in the distal one half of right lower leg extending into the right ankle.  There are no areas of foot or ankle tenderness bilaterally.  No areas of knee tenderness, and no knee edema is noted bilaterally.  I reviewed the x-ray studies with patient at time of exam, and I did not note any abnormality.  I reviewed the radiology report with patient at time of exam also, where no abnormality was noted.  Assessment/Plan   1. Bilateral leg pain  ketorolac injection 60 mg (TORADOL)   2. Injury, lower leg, right, initial encounter  XR Tibia and Fibula Right    XR Tibia and Fibula Right   3. Injury, lower leg, left, initial encounter  XR Tibia and Fibula Left    XR Tibia and Fibula Left   4. Shin splints, initial encounter  HYDROcodone-acetaminophen 5-325 mg per tablet    bilateral legs       Patient Instructions   Caution that the hydreocodone may cause drowsiness and constipation, and can lead to psychological addiction in some people. There is also acetaminophen in this medication, so do not take other products containing this when using the prescription. You may take ibuprofen or naproxen for pain relief before the narcotic. Wait 6 hours from discharge before taking any naproxen or ibuprofen. Be seen again in 3 days if symptoms are not better, sooner if feeling any worse.    Shin Splints (Medial Tibial Stress Syndrome)  Pain felt in the front of your lower leg is often called shin splints. One common cause of this pain is tendinitis--inflammation of tendons (tough, cordlike bands of tissue  that connect muscle to bone). When the tendons of the muscles near the shinbone (tibia) become inflamed, the pain is felt along the shin. Shin splints often affect athletes and runners, and are commonly due to overuse. A less common cause is flat feet with low arches.  Symptoms of shin splints  Symptoms of shin splints often start as a dull ache that gets worse over time. Pain may also be sharp or stabbing. Resting your legs often relieves the symptoms. Pain may occur both during or after activity. Later, the pain may become continuous with almost any activity.  Your evaluation  Your doctor will ask you questions about your activities and your health history. Be sure to tell your doctor about possible injuries. The diagnosis is usually made through the history and physical exam. There are no tests for shin splints, but your doctor may want to do some tests to rule out a stress fracture in your shinbone. These tests may include an X-ray, bone scan, or MRI (magnetic resonance imaging) test.    Treating shin splints  Follow these and any other instructions you are given.    Rest: Cut down on running and high-impact sports, or avoid them completely to allow your legs to rest and the injury to heal.    Ice: Put ice on the painful areas. Use an ice pack or bag of frozen peas. Put a thin cloth between the cold source and your skin. Ice for 15 minutes every 3 hours.    Medications: Take nonsteroidal anti-inflammatory medicines (NSAIDs), such as ibuprofen, as directed by your doctor.  Preventing shin splints  To help prevent shin splints in the future:    Warm up before you run. Do gentle calf-stretching exercises.    Be careful not to overtrain.    Avoid running on hard or uneven surfaces.    If you have flat feet or low arches, consider orthotics or insoles for correction.  Be sure you are using running shoes with good support and cushioned soles.  Date Last Reviewed: 11/10/2015    5366-1738 [employer] [address]  [place] [address]. All rights reserved. This information is not intended as a substitute for professional medical care. Always follow your healthcare professional's instructions.           Reza Portillo,

## 2021-06-27 ENCOUNTER — HEALTH MAINTENANCE LETTER (OUTPATIENT)
Age: 41
End: 2021-06-27

## 2021-06-29 ENCOUNTER — COMMUNICATION - HEALTHEAST (OUTPATIENT)
Dept: EDUCATION SERVICES | Facility: CLINIC | Age: 41
End: 2021-06-29

## 2021-06-29 DIAGNOSIS — E11.9 TYPE 2 DIABETES MELLITUS WITHOUT COMPLICATION, WITHOUT LONG-TERM CURRENT USE OF INSULIN (H): ICD-10-CM

## 2021-06-29 RX ORDER — LANCETS
EACH MISCELLANEOUS
Qty: 100 EACH | Refills: 11 | Status: SHIPPED | OUTPATIENT
Start: 2021-06-29 | End: 2023-05-16

## 2021-06-29 NOTE — PROGRESS NOTES
Progress Notes by Brock Barr MD at 10/15/2020 10:20 AM     Author: Brock Barr MD Service: -- Author Type: Physician    Filed: 10/15/2020 11:07 AM Encounter Date: 10/15/2020 Status: Signed    : Brock Barr MD (Physician)            [facility] Consultation Note    Thank you, Dr. Melanie Marquez, for advising Luan Morgan to meet with me in consultation today at the [facility] to evaluate chest pain.     Assessment:    1. Chest pain  NM Exercise Stress Test   2. Dyslipidemia, goal LDL below 70  rosuvastatin (CRESTOR) 10 MG tablet    Lipid Profile       Plan:    1. Rosuvastatin 10 mg orally daily to treat his severe dyslipidemia in the setting of type 2 diabetes mellitus, which he and his wife state was diagnosed just this month.  2. Exercise nuclear stress testing to exclude inducible ischemia as a cause of his nonspecific chest discomfort.    An After Visit Summary was printed and given to the patient.    Current History:    Luan tells me that at 10 PM two days ago he felt a squeezing in his substernal chest area and then he had abdominal pain and put on his finger oxygen meter which said his heart rate was 37 bpm and his saturation was 88%. He states that Dr. Stearns advised him to get the meter and a blood pressure cuff at his visit in the walk in clinic on October 7. He states the chest discomfort was present all night last night and the night before.    He was recently started on 2 antibiotics to treat H. pylori gastritis.  He describes intermittent abdominal discomfort and diarrhea.  His system review was also notable for constipation, heartburn, dyspnea with moderately strenuous exertion, muscle weakness and dizziness.    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    His tobacco history was not previously completed; he told me today that he smoked 3-4 packages of cigarettes a year for 15 years and  stopped smoking in 2013.    Patient Active Problem List   Diagnosis   ? Type 2 diabetes mellitus (H)   ? Dyslipidemia, goal LDL below 70   ? Nonalcoholic hepatosteatosis   ? Severe obesity (BMI 35.0-35.9 with comorbidity) (H)   ? Chest pain       Past Medical History:  Past Medical History:   Diagnosis Date   ? Mixed hyperlipidemia 10/3/2020   ? Tenesmus 10/8/2020   ? Type 2 diabetes mellitus (H) 10/3/2020       Past Surgical History:  Past Surgical History:   Procedure Laterality Date   ? NO PAST SURGERIES         Family History:  Family History   Problem Relation Age of Onset   ? Diabetes type II Mother    ? Diabetes type II Father    ? Heart disease Father Brett Roland does not know what kind of heart disease   ? Sudden death Paternal Uncle        Social History:   reports that he quit smoking about 7 years ago. His smoking use included cigarettes. He has a 52.50 pack-year smoking history. He has never used smokeless tobacco. He reports that he does not drink alcohol or use drugs.    Medications:  Outpatient Encounter Medications as of 10/15/2020   Medication Sig Dispense Refill   ? alcohol swabs PadM Apply 1 each topically daily. 100 each 3   ? amoxicillin (AMOXIL) 500 MG tablet Take 2 tablets (1,000 mg total) by mouth 2 (two) times a day for 14 days. 56 tablet 0   ? blood glucose test strips Use 1 each As Directed daily. Dispense brand per patient's insurance at pharmacy discretion. 100 strip 3   ? clarithromycin (BIAXIN) 500 MG tablet Take 1 tablet (500 mg total) by mouth 2 (two) times a day for 14 days. For Helicobacter pylori gastritis. 28 tablet 0   ? lancets 32 gauge Misc Use 1 each As Directed daily. 100 each 3   ? lansoprazole (PREVACID) 30 MG capsule Take 1 capsule (30 mg total) by mouth 2 (two) times a day for 14 days. 28 capsule 0     No facility-administered encounter medications on file as of 10/15/2020.        Allergies:  Patient has no known allergies.    Review of Systems:     General: Weight  "Loss  Eyes: WNL  Ears/Nose/Throat: WNL  Lungs: WNL  Heart: Chest Pain, Arm Pain, Shortness of Breath with activity, Irregular Heartbeat  Stomach: Constipation, Heartburn  Bladder: WNL  Muscle/Joints: Muscle Weakness  Skin: WNL  Nervous System: Dizziness  Mental Health: WNL     Blood: WNL    Objective:    Wt Readings from Last 5 Encounters:   10/15/20 207 lb (93.9 kg)   10/14/20 210 lb (95.3 kg)   10/07/20 213 lb 4.8 oz (96.8 kg)   10/03/20 221 lb (100.2 kg)   10/02/20 200 lb (90.7 kg)      5' 3\" (1.6 m)  Body mass index is 36.67 kg/m .  /74 (Patient Site: Right Arm, Patient Position: Sitting, Cuff Size: Adult Large)   Pulse 84   Resp 16   Ht 5' 3\" (1.6 m)   Wt 207 lb (93.9 kg)   BMI 36.67 kg/m       Physical Exam:    General Appearance: Alert and not in distress   HEENT: No scleral icterus; the tongue could not be examined as he is wearing a mask due to Covid restrictions   Neck: No cervical bruits, adenopathy, or thyromegaly; jugular venous pressure is difficult to evaluate due to obesity   Chest: The spine is straight and the chest is symmetric   Lungs: Respirations are unlabored; the lungs are clear to auscultation   Cardiovasular: Auscultation reveals normal first and second heart sounds and no murmurs, rubs, or gallops; the carotid, radial, femoral, and posterior tibial pulses are intact   Abdomen: No organomegaly, masses, bruits, or tenderness; bowel sounds are present   Extremities: No cyanosis, clubbing or edema   Skin: No xanthelasma   Neurologic: Mood and affect are appropriate       Cardiac testing:    EKG: I have personally reviewed 4 EKGs from the month of October, 2020 and 1 demonstrates sinus tachycardia and 3 demonstrate sinus rhythm and all are normal.    Imaging:    Xr Chest 2 Views    Result Date: 10/14/2020  EXAM: XR CHEST 2 VIEWS LOCATION: [facility] DATE/TIME: 10/14/2020 8:34 AM INDICATION: Chest pain. COMPARISON: 10/02/2020.     Possible mild perihilar " predominant airway thickening or bronchitis. Correlate for symptoms. Otherwise, no significant change or obvious acute process. Lungs are clear.     Xr Chest 2 Views    Result Date: 10/2/2020  EXAM: XR CHEST 2 VIEWS LOCATION: [facility] DATE/TIME: 10/2/2020 10:49 PM INDICATION: cp COMPARISON: None.     Negative chest.    Xr Abdomen 2 Views    Result Date: 10/7/2020  EXAM DATE:         10/07/2020 EXAM: X-RAY ABDOMEN, 2 VIEWS LOCATION: [facility] DATE/TIME: 10/7/2020 10:15 AM INDICATION: Lower abdominal pain, upper abdominal pain, nausea, evaluate for constipation / obstruction COMPARISON: None. IMPRESSION: Negative abdomen. Bowel gas pattern is normal. Minimal air in the colon. No significant colonic stool. Nothing for obstruction or free air. No evidence for renal stones.     Ct Abdomen Pelvis With Oral With Iv Contrast    Result Date: 10/7/2020  EXAM DATE:         10/07/2020 EXAM: CT ABDOMEN, PELVIS WITH CONTRAST LOCATION: [facility] DATE/TIME: 10/7/2020 11:00 AM INDICATION: Lower abdominal pain, upper abdominal pain, nausea, evaluate for constipation / obstruction COMPARISON: Radiograph 10/7/2020 TECHNIQUE: CT scan of the abdomen and pelvis was performed following injection of IV contrast. Multiplanar reformats were obtained. Dose reduction techniques were used. CONTRAST: 100 ml Isovue 370 was administered via iv from a single use vial. SPR istat CR= 1.0 mg/dl, eGFR=62. FINDINGS: LOWER CHEST: Small left Bochdalek hernia. HEPATOBILIARY: Hepatic steatosis. PANCREAS: Normal. SPLEEN: Normal. ADRENAL GLANDS: Normal. KIDNEYS/BLADDER: Normal. BOWEL: No obstruction or inflammatory change. Normal appendix. LYMPH NODES: Normal. VASCULATURE: Unremarkable. PELVIC ORGANS: Normal. MUSCULOSKELETAL: Mild degenerative disc disease at L4-5 and L5-S1. IMPRESSION: 1.  No acute abnormality in the abdomen or pelvis. 2.  Hepatic steatosis.       Lab Review:    Lab Results    Component Value Date     10/14/2020    K 3.8 10/14/2020     10/14/2020    CO2 24 10/14/2020    BUN 13 10/14/2020    CREATININE 1.01 10/14/2020    CALCIUM 9.3 10/14/2020     Lab Results   Component Value Date    WBC 9.0 10/14/2020    HGB 15.6 10/14/2020    HCT 47.0 10/14/2020    MCV 85 10/14/2020     10/14/2020     Lab Results   Component Value Date    CHOL 262 (H) 10/03/2020    TRIG 127 10/03/2020    HDL 41 10/03/2020    LDLDIRECT 170 (H) 05/11/2018     LDL Calculated (mg/dL)   Date Value   10/03/2020 196 (H)     BNP (pg/mL)   Date Value   10/14/2020 <10   10/02/2020 <10           Much or all of the text in this note was generated through the use of the Dragon Dictate voice-to-text software. Errors in spelling or words which seem out of context are unintentional. Sound alike errors, in particular, may have escaped editing.

## 2021-07-02 ENCOUNTER — COMMUNICATION - HEALTHEAST (OUTPATIENT)
Dept: FAMILY MEDICINE | Facility: CLINIC | Age: 41
End: 2021-07-02

## 2021-07-02 DIAGNOSIS — E11.65 TYPE 2 DIABETES MELLITUS WITH HYPERGLYCEMIA, WITHOUT LONG-TERM CURRENT USE OF INSULIN (H): ICD-10-CM

## 2021-07-02 RX ORDER — BLOOD SUGAR DIAGNOSTIC
STRIP MISCELLANEOUS
Qty: 100 STRIP | Refills: 3 | Status: SHIPPED | OUTPATIENT
Start: 2021-07-02

## 2021-07-03 NOTE — ADDENDUM NOTE
Addendum Note by Shahla Joaquin MD at 10/26/2020  7:40 AM     Author: Shahla Joaquin MD Service: -- Author Type: Physician    Filed: 10/27/2020 11:33 AM Encounter Date: 10/26/2020 Status: Signed    : Shahla Joaquin MD (Physician)    Addended by: SHAHLA JOAQUIN on: 10/27/2020 11:33 AM        Modules accepted: Orders

## 2021-07-03 NOTE — ADDENDUM NOTE
Addendum Note by Clay Santamaria MD at 3/7/2018 10:05 AM     Author: Clay Santamaria MD Service: -- Author Type: Physician    Filed: 3/7/2018 10:05 AM Encounter Date: 3/5/2018 Status: Signed    : Clay Santamaria MD (Physician)    Addended by: CLAY SANTAMARIA on: 3/7/2018 10:05 AM        Modules accepted: Orders

## 2021-07-04 NOTE — ADDENDUM NOTE
Addendum Note by Shahla Joaquin MD at 4/29/2021 10:00 AM     Author: Shahla Joaquin MD Service: -- Author Type: Physician    Filed: 5/7/2021  1:06 PM Encounter Date: 4/29/2021 Status: Signed    : Shahla Joaquin MD (Physician)    Addended by: SHAHLA JOAQUIN on: 5/7/2021 01:06 PM        Modules accepted: Level of Service

## 2021-07-04 NOTE — TELEPHONE ENCOUNTER
Telephone Encounter by Gloria Juarez RN at 7/2/2021  1:04 PM     Author: Gloria Juarez RN Service: -- Author Type: Registered Nurse    Filed: 7/2/2021  1:06 PM Encounter Date: 7/2/2021 Status: Signed    : Gloria Juarez RN (Registered Nurse)       Refill Approved    Rx renewed per Medication Renewal Policy. Medication was last renewed on 10/08/2020.  Last office visit was 05/13/2021 with PCP.    Gloria Juarez, [facility] Triage/Med Refill 7/2/2021     Requested Prescriptions   Pending Prescriptions Disp Refills   ? ACCU-CHEK GUIDE TEST STRIPS strips [Pharmacy Med Name: ACCU-CHEK GUIDE TEST STRIPS 50] 100 strip 3     Sig: USE TO TEST ONCE DAILY       Diabetic Supplies Refill Protocol Passed - 7/2/2021 12:41 PM        Passed - Visit with PCP or prescribing provider visit in last 6 months     Last office visit with prescriber/PCP: 10/26/2020 Shahla Joaquin MD OR same dept: 10/26/2020 Shahla Joaquin MD OR same specialty: 10/26/2020 Shahla Joaquin MD  Last physical: Visit date not found Last MTM visit: Visit date not found   Next visit within 3 mo: Visit date not found  Next physical within 3 mo: Visit date not found  Prescriber OR PCP: Shahla Joaquin MD  Last diagnosis associated with med order: 1. Type 2 diabetes mellitus with hyperglycemia, without long-term current use of insulin (H)  - ACCU-CHEK GUIDE TEST STRIPS strips [Pharmacy Med Name: ACCU-CHEK GUIDE TEST STRIPS 50]; USE TO TEST ONCE DAILY  Dispense: 100 strip; Refill: 3    If protocol passes may refill for 12 months if within 3 months of last provider visit (or a total of 15 months).             Passed - A1C in last 6 months     Hemoglobin A1c   Date Value Ref Range Status   02/11/2021 5.3 <=5.6 % Final

## 2021-07-22 NOTE — PROGRESS NOTES
Assessment/Plan:   Acute intractable headache, unspecified headache type  Cervical paraspinal muscle spasm  Cellulitis of head except face  Onset of severe right side head pain during the night, no known trauma. Some associated neck stiffness and sore muscles started the day before. Due to the abrupt onset and the severity raising concern for intracranial hemorrhage, mass, infection, and concern of the patient, we obtained a head CT - no soft tissue swelling noted, no intracranial bleed or mass or other definite pathology. He was given Toradol IM in the clinic.   The pain seems superficial - extracranial. Consider cellulitis, shingles, other neurology, muscle spasms. We will treat with ibuprofen and tylenol, ice/heat, flexeril and start cephalexin. Close follow up with primary care - to ER if worse or new features develop.   I discussed red flag symptoms, return precautions to clinic/ER and follow up care with patient/parent.  Expected clinical course, symptomatic cares advised. Questions answered. Patient/parent amenable with plan.  - CT Head Without Contrast  - ketorolac injection 30 mg (TORADOL)  - cephalexin (KEFLEX) 500 MG capsule; Take 1 capsule (500 mg total) by mouth 3 (three) times a day for 10 days.  Dispense: 30 capsule; Refill: 0  - cyclobenzaprine (FLEXERIL) 5 MG tablet; Take 1 tablet (5 mg total) by mouth 3 (three) times a day as needed for muscle spasms.  Dispense: 30 tablet; Refill: 0    Alternate ice and heat to painful muscles in neck and head  Toradol was given in he office so no Ibuprofen until tomorrow - then you can take 600mg with food every 6 hours  Acetaminophen (tylenol) 500-1000mg every 6-8 hours may be taken in between ibuprofen if needed for pain. May start this now.   Cyclobenzaprine 5-10 mg every 8 hours as needed for muscle spasms. Start with 5mg. May increase to 10 if needed - this will increase the drowsiness.   Cephalexin three times a day for 7-10 days for possible infection.     Watch for worse pain, vision changes, rash on the scalp and be seen or notify our provider if that is the case.   Return if no improvement in a couple days.       Subjective:      Luan Morgan is a 40 y.o. male who presents for evaluation of severe pain on the right side of his head. This started overnight at 2:54am - may have woken him from sleep. He felt a 'pop' and then noticed the pain. He sleeps on his side, after the pop it was too tender to sleep on that side. He took some OTC pain relievers and eventually was able to return to sleep for awhile. When he woke this morning it was still painful. He is also having some tightness and pain in the neck and shoulders, this had started yesterday but was less intense. The pain is constant with occasional flares. Throbbing in nature.   Some dizziness - not true vertigo, more weakness or lightheadedness. Minimal nausea initially, resolved. No vomiting.   He feels the right side of his head is swollen, tender to touch. It hurts to move his hair. No rash, no redness, no sores or lesions.   No change in vision. Though it is blurry due to pain sometimes. No significant light sensitivity.   No URI or nasal congestion, ear pain, ST or cough.   No fever. No back pain, no localizing neuro deficits.   No history of hypertension or migraine.  No trauma or injury.     No Known Allergies  Current Outpatient Medications on File Prior to Visit   Medication Sig Dispense Refill     alcohol swabs PadM Apply 1 each topically daily. 100 each 3     azelastine (ASTELIN) 137 mcg (0.1 %) nasal spray Use in each nostril as directed 30 mL 12     blood glucose test strips Use 1 each As Directed daily. Dispense brand per patient's insurance at pharmacy discretion. 100 strip 3     clotrimazole (LOTRIMIN) 1 % cream Apply topically 2 (two) times a day. 30 g 0     ergocalciferol (ERGOCALCIFEROL) 1,250 mcg (50,000 unit) capsule Take 1 capsule (50,000 Units total) by mouth every 30 (thirty) days. 3  capsule 4     generic lancets Use 1 each As Directed as needed. Use new needle for each finger poke, two times per day. 100 each 6     lancets 32 gauge Misc Use 1 each As Directed daily. 100 each 3     omeprazole (PRILOSEC) 40 MG capsule Take 1 capsule (40 mg total) by mouth daily before breakfast. 90 capsule 3     rosuvastatin (CRESTOR) 40 MG tablet Take 1 tablet (40 mg total) by mouth at bedtime. 90 tablet 3     senna-docusate (SENNOSIDES-DOCUSATE SODIUM) 8.6-50 mg tablet Take 2 tablets by mouth daily. 50 tablet 0     No current facility-administered medications on file prior to visit.      Patient Active Problem List   Diagnosis     Type 2 diabetes mellitus with hyperglycemia, without long-term current use of insulin (H)     Dyslipidemia, goal LDL below 70     Nonalcoholic hepatosteatosis     Obesity (BMI 30.0-34.9)     Chest pain     Bochdalek hernia     Obesity (BMI 35.0-39.9) with comorbidity (H)     Gastroesophageal reflux disease with esophagitis without hemorrhage     Vitamin D deficiency       Objective:     /78   Pulse 71   Temp 98  F (36.7  C) (Oral)   Resp 16   SpO2 98%     Physical  General Appearance: Alert, cooperative, no distress but extremely uncomfortable and that clearly makes him anxious. AVSS  Head: Normocephalic, without obvious abnormality, atraumatic. There are a few scratches on top of head, no other definite cellulitis though right side of head slightly warmer, slight pink though hay be from palpation, no vesicles or typical shingles rash. Tender to even light touch, moving hair is painful. Not specifically tender over the temporal artery.   Cranial nerves intact and symmetrical.   He and SO feel the right side of head is swollen, there is no significant edema or fluctuance, perhaps more prominence of the parietal muscle but it is not dissimilar from the left side where the parietal muscle is also prominent.   Eyes: Conjunctivae are normal. Extraocular movements are intact. No  nystagmus or vertigo, PERRLA, no photophobia.   Ears: Normal TMs and external ear canals, both ears  Nose: No significant congestion.  Throat: Throat is normal.  No exudate.  No significant lesions  Neck: No adenopathy; the cervical and trapezius muscles are tight and tender with ROM L>R actually. No definite meningismus.   Lungs:  respirations unlabored  Extremities: No lower extremity or UE edema. Normal motion and strength in BUE and BLE. Normal MARIANA. Negative romberg.   Skin: as above, no other rashes or lesions  Psychiatric: Patient has a normal mood and affect that is currently anxious due to pain and concern for cause of pain.     Ct Head Without Contrast    Result Date: 5/6/2021  EXAM DATE:         05/06/2021 EXAM: CT HEAD WITHOUT CONTRAST LOCATION: [facility] DATE/TIME: 5/6/2021 3:15 PM INDICATION: Headache, acute, normal neuro exam; abrupt onset severe right side headache; new tender soft tissue swelling over right parietal region COMPARISON: None. TECHNIQUE: Routine CT Head without IV contrast. Multiplanar reformats. Dose reduction techniques were used. FINDINGS: INTRACRANIAL CONTENTS: No intracranial hemorrhage, extraaxial collection, or mass effect.  No CT evidence of acute infarct. Normal parenchymal attenuation. Normal ventricles and sulci, without hydrocephalus. VISUALIZED ORBITS/SINUSES/MASTOIDS: No intraorbital abnormality. No paranasal sinus mucosal disease. No middle ear or mastoid effusion. BONES/SOFT TISSUES: No acute abnormality. The soft tissues of the right parietal scalp are unremarkable. The temporalis muscles appear grossly symmetric. IMPRESSION: 1.  No acute intracranial abnormality. 2.  The soft tissues of the right parietal scalp are unremarkable, without localized inflammation or fluid collection. The temporalis muscles appear grossly symmetric.

## 2021-08-16 ENCOUNTER — ALLIED HEALTH/NURSE VISIT (OUTPATIENT)
Dept: FAMILY MEDICINE | Facility: CLINIC | Age: 41
End: 2021-08-16
Payer: COMMERCIAL

## 2021-08-16 DIAGNOSIS — Z53.9 ERRONEOUS ENCOUNTER--DISREGARD: Primary | ICD-10-CM

## 2021-09-13 ENCOUNTER — IMMUNIZATION (OUTPATIENT)
Dept: NURSING | Facility: CLINIC | Age: 41
End: 2021-09-13
Attending: FAMILY MEDICINE
Payer: COMMERCIAL

## 2021-09-13 PROCEDURE — 91301 PR COVID VAC MODERNA 100 MCG/0.5 ML IM: CPT | Performed by: FAMILY MEDICINE

## 2021-09-13 PROCEDURE — 0012A PR COVID VAC MODERNA 100 MCG/0.5 ML IM: CPT | Performed by: FAMILY MEDICINE

## 2021-10-08 DIAGNOSIS — R07.9 CHEST PAIN, UNSPECIFIED TYPE: Primary | ICD-10-CM

## 2021-10-08 DIAGNOSIS — E78.5 DYSLIPIDEMIA, GOAL LDL BELOW 70: ICD-10-CM

## 2021-10-08 RX ORDER — ROSUVASTATIN CALCIUM 40 MG/1
40 TABLET, COATED ORAL AT BEDTIME
Qty: 90 TABLET | Refills: 0 | Status: SHIPPED | OUTPATIENT
Start: 2021-10-08 | End: 2022-01-14

## 2021-10-17 ENCOUNTER — HEALTH MAINTENANCE LETTER (OUTPATIENT)
Age: 41
End: 2021-10-17

## 2021-12-15 ENCOUNTER — E-VISIT (OUTPATIENT)
Dept: FAMILY MEDICINE | Facility: CLINIC | Age: 41
End: 2021-12-15
Payer: COMMERCIAL

## 2021-12-15 DIAGNOSIS — Z53.9 ERRONEOUS ENCOUNTER--DISREGARD: Primary | ICD-10-CM

## 2022-01-06 ENCOUNTER — VIRTUAL VISIT (OUTPATIENT)
Dept: FAMILY MEDICINE | Facility: CLINIC | Age: 42
End: 2022-01-06
Payer: COMMERCIAL

## 2022-01-06 DIAGNOSIS — E66.01 MORBID OBESITY (H): ICD-10-CM

## 2022-01-06 DIAGNOSIS — N41.9 PROSTATITIS, UNSPECIFIED PROSTATITIS TYPE: Primary | ICD-10-CM

## 2022-01-06 DIAGNOSIS — E11.65 TYPE 2 DIABETES MELLITUS WITH HYPERGLYCEMIA, WITHOUT LONG-TERM CURRENT USE OF INSULIN (H): ICD-10-CM

## 2022-01-06 PROCEDURE — 99213 OFFICE O/P EST LOW 20 MIN: CPT | Mod: TEL | Performed by: FAMILY MEDICINE

## 2022-01-06 RX ORDER — LEVOFLOXACIN 750 MG/1
750 TABLET, FILM COATED ORAL DAILY
Qty: 14 TABLET | Refills: 0 | Status: SHIPPED | OUTPATIENT
Start: 2022-01-06 | End: 2022-01-14

## 2022-01-06 NOTE — PROGRESS NOTES
Luan is a 41 year old who is being evaluated via a billable telephone visit.      How would you like to obtain your AVS? MyChart  If the video visit is dropped, the invitation should be resent by: Text to cell phone: [phone]   Will anyone else be joining your video visit? No    ____________________________    Virtual Visit - Telephone Encounter  [facility]  Date of Service: 1/6/2022    Subjective:  Chief Complaint   Patient presents with     Nasal Congestion     Otalgia     Prostate Problem      Prostate  Same symptoms. Pain on penis, scrotum, and perineum.  No urinary symptoms. Started in November.   Almost completely went away with antibiotics in April.     Diabetes  Blood sugars are good.     Objective:         Speech is normal  Patient is calm  No visits with results within 1 Week(s) from this visit.   Latest known visit with results is:   Ambulatory - [facility] on 04/15/2021   Component Date Value Ref Range Status     Vitamin D, Total (25-Hydroxy) 04/15/2021 29.0* 30.0 - 80.0 ng/mL Final     Culture 04/15/2021 No Growth   Final     Chlamydia Trachomatis 04/15/2021 Negative  Negative Final     Neisseria gonorrhoeae 04/15/2021 Negative  Negative Final     LDL Cholesterol Direct 04/15/2021 187* <=129 mg/dl Final     Vitamin D, Total (25-Hydroxy) 04/15/2021 29.0* 30.0 - 80.0 ng/mL Final     No results found.   Assessment & Plan:    Prostatitis - recurrent. Obtain labs. Treat with levofloxacin 750 mg for 14 days. Referral to urology.    DM2, well controlled per report.     Obesity - weight stable. Impacting diabetes.     Order Summary    ICD-10-CM    1. Prostatitis, unspecified prostatitis type  N41.9 Adult Urology Referral     Urine Culture     Neisseria gonorrhoeae PCR     Chlamydia trachomatis PCR     UA reflex to Microscopic - lab collect     DISCONTINUED: levofloxacin (LEVAQUIN) 750 MG tablet   2. Type 2 diabetes mellitus with hyperglycemia, without long-term  current use of insulin (H)  E11.65    3. Morbid obesity (H)  E66.01      Future Appointments   Date Time Provider Department Center   1/6/2022  3:40 PM Shahla Joaquin MD [facility]      Completed by: Shahla Joaquin M.D., [facility]. 1/6/2022 3:35 PM.  This transcription uses voice recognition software, which may contain typographical errors.  Start call: 3:35 PM. End call: 3:47 PM.  ____________________________

## 2022-01-06 NOTE — PATIENT INSTRUCTIONS
Patient Education     Prostatitis     The prostate gland is located deep inside the body at the base of the bladder. Prostatitis is an inflammation of the prostate gland. This can occur with or without infection. Most cases of prostatitis are long term (chronic). Most do not include a bacterial infection.    Chronic prostatitis is more common in older men. It is often an inflammatory condition and not an infection. But bacterial infection can also cause chronic prostatitis. It can cause pain in the rectum, urethra, bladder, or scrotum. It can also make you unable to fully empty the bladder. You may urinate often. Or you may have burning with urination. Prostatitis may also cause painful ejaculation and erectile dysfunction.    Acute prostatitis happens suddenly. This often occurs in men younger than 35. It is from a bacterial infection. You may have severe symptoms such as fever, chills, muscle aches, and pain in the area between the scrotum and anus (perineum). You may have a hard time urinating. Or you may have pain or burning when urinating. There may be blood or pus in the urine.  Your healthcare provider may do a culture test on prostate fluids or discharge from the penis. This will help figure out if bacteria are the cause. Treatment can include antibiotics, anti-inflammatory medicine, prostate medicines, and stool softeners.  Home care  These guidelines will help you care for yourself at home:    Rest at home until the fever is gone and you are feeling better.    A hot sitz bath may offer some relief. Fill a tub with 6 inches of hot water. Allow the water to run so you can keep it hot for 10 to 15 minutes.    Drink plenty of fluids. Don't drink alcohol or caffeine until all symptoms are gone.    If your healthcare gives you an antibiotic, take it exactly as you are told. Take it until it is all gone.    Constipation causes straining and pain. Prevent constipation by eating natural laxatives such as prunes,  fresh fruits, and whole-grain cereals. If needed, use a mild over-the-counter (OTC) laxative for constipation. An OTC stool softener may be used to keep the stools soft.    If sex is uncomfortable or painful, don't have sex until symptoms get better.    You may use OTC medicines for pain and fever, unless another medicine was given. If you have chronic liver or kidney disease, talk with your healthcare provider before using these medicines. Also talk with your provider if you've ever had a stomach ulcer or GI (gastrointestinal) bleeding.  Follow-up care  Follow up with your healthcare provider, a urologist, or as advised to be sure you are responding to treatment. Your healthcare provider may want to see you after you finish your antibiotics to be sure the infection has cleared. If a culture was taken, you may call for the results as directed. A culture test can help your provider know if you are on the correct antibiotic.  Call 911  Call 911if any of these occur:    Weakness, dizziness, or fainting  When to get medical advice  Call your healthcare provider right away if any of these occur:    Fever of 100.4 F (38 C) or higher, or as directed by your healthcare provider    Unable to pass urine for 8 hours    Pressure or pain in your bladder gets worse    Painful swelling of the testicle or scrotum  [employer] last reviewed this educational content on 9/1/2019 2000-2021 [employer]. All rights reserved. This information is not intended as a substitute for professional medical care. Always follow your healthcare professional's instructions.

## 2022-01-11 ENCOUNTER — LAB (OUTPATIENT)
Dept: LAB | Facility: CLINIC | Age: 42
End: 2022-01-11
Payer: COMMERCIAL

## 2022-01-11 DIAGNOSIS — E78.5 DYSLIPIDEMIA, GOAL LDL BELOW 70: ICD-10-CM

## 2022-01-11 DIAGNOSIS — E11.65 TYPE 2 DIABETES MELLITUS WITH HYPERGLYCEMIA, WITHOUT LONG-TERM CURRENT USE OF INSULIN (H): ICD-10-CM

## 2022-01-11 DIAGNOSIS — N41.9 PROSTATITIS, UNSPECIFIED PROSTATITIS TYPE: ICD-10-CM

## 2022-01-11 DIAGNOSIS — E55.9 VITAMIN D DEFICIENCY: ICD-10-CM

## 2022-01-11 DIAGNOSIS — K76.0 NONALCOHOLIC HEPATOSTEATOSIS: ICD-10-CM

## 2022-01-11 LAB
ALBUMIN SERPL-MCNC: 4.4 G/DL (ref 3.5–5)
ALBUMIN UR-MCNC: NEGATIVE MG/DL
ALP SERPL-CCNC: 86 U/L (ref 45–120)
ALT SERPL W P-5'-P-CCNC: 107 U/L (ref 0–45)
ANION GAP SERPL CALCULATED.3IONS-SCNC: 14 MMOL/L (ref 5–18)
APPEARANCE UR: CLEAR
AST SERPL W P-5'-P-CCNC: 59 U/L (ref 0–40)
BILIRUB SERPL-MCNC: 0.5 MG/DL (ref 0–1)
BILIRUB UR QL STRIP: NEGATIVE
BUN SERPL-MCNC: 14 MG/DL (ref 8–22)
CALCIUM SERPL-MCNC: 9.8 MG/DL (ref 8.5–10.5)
CHLORIDE BLD-SCNC: 102 MMOL/L (ref 98–107)
CHOLEST SERPL-MCNC: 272 MG/DL
CO2 SERPL-SCNC: 23 MMOL/L (ref 22–31)
COLOR UR AUTO: YELLOW
CREAT SERPL-MCNC: 0.91 MG/DL (ref 0.7–1.3)
FASTING STATUS PATIENT QL REPORTED: YES
GFR SERPL CREATININE-BSD FRML MDRD: >90 ML/MIN/1.73M2
GLUCOSE BLD-MCNC: 114 MG/DL (ref 70–125)
GLUCOSE UR STRIP-MCNC: NEGATIVE MG/DL
HBA1C MFR BLD: 7.3 % (ref 0–5.6)
HDLC SERPL-MCNC: 44 MG/DL
HGB UR QL STRIP: NEGATIVE
KETONES UR STRIP-MCNC: NEGATIVE MG/DL
LDLC SERPL CALC-MCNC: 188 MG/DL
LEUKOCYTE ESTERASE UR QL STRIP: NEGATIVE
NITRATE UR QL: NEGATIVE
PH UR STRIP: 5.5 [PH] (ref 5–8)
POTASSIUM BLD-SCNC: 4 MMOL/L (ref 3.5–5)
PROT SERPL-MCNC: 7.9 G/DL (ref 6–8)
SODIUM SERPL-SCNC: 139 MMOL/L (ref 136–145)
SP GR UR STRIP: 1.02 (ref 1–1.03)
TRIGL SERPL-MCNC: 200 MG/DL
UROBILINOGEN UR STRIP-ACNC: 0.2 E.U./DL

## 2022-01-11 PROCEDURE — 36415 COLL VENOUS BLD VENIPUNCTURE: CPT

## 2022-01-11 PROCEDURE — 87591 N.GONORRHOEAE DNA AMP PROB: CPT

## 2022-01-11 PROCEDURE — 81003 URINALYSIS AUTO W/O SCOPE: CPT

## 2022-01-11 PROCEDURE — 80061 LIPID PANEL: CPT

## 2022-01-11 PROCEDURE — 83036 HEMOGLOBIN GLYCOSYLATED A1C: CPT

## 2022-01-11 PROCEDURE — 80053 COMPREHEN METABOLIC PANEL: CPT

## 2022-01-11 PROCEDURE — 87086 URINE CULTURE/COLONY COUNT: CPT

## 2022-01-11 PROCEDURE — 82306 VITAMIN D 25 HYDROXY: CPT

## 2022-01-11 PROCEDURE — 87491 CHLMYD TRACH DNA AMP PROBE: CPT

## 2022-01-12 PROBLEM — E78.2 MIXED HYPERLIPIDEMIA: Status: ACTIVE | Noted: 2020-10-03

## 2022-01-12 LAB
BACTERIA UR CULT: NO GROWTH
C TRACH DNA SPEC QL NAA+PROBE: NEGATIVE
DEPRECATED CALCIDIOL+CALCIFEROL SERPL-MC: 24 UG/L (ref 30–80)
N GONORRHOEA DNA SPEC QL NAA+PROBE: NEGATIVE

## 2022-01-13 ENCOUNTER — APPOINTMENT (OUTPATIENT)
Dept: INTERPRETER SERVICES | Facility: CLINIC | Age: 42
End: 2022-01-13
Payer: COMMERCIAL

## 2022-01-13 ENCOUNTER — TELEPHONE (OUTPATIENT)
Dept: FAMILY MEDICINE | Facility: CLINIC | Age: 42
End: 2022-01-13
Payer: COMMERCIAL

## 2022-01-13 DIAGNOSIS — E11.65 TYPE 2 DIABETES MELLITUS WITH HYPERGLYCEMIA, WITHOUT LONG-TERM CURRENT USE OF INSULIN (H): Primary | ICD-10-CM

## 2022-01-13 DIAGNOSIS — E78.5 DYSLIPIDEMIA, GOAL LDL BELOW 70: ICD-10-CM

## 2022-01-13 NOTE — TELEPHONE ENCOUNTER
RN attempted to call patient via [employer]  to relay Dr. Joaquin' message below.      Patient did not answer. Left non-detailed message to patient to call the clinic back.          Sophia Felix RN  [facility]            ----- Message from Shahla Joaquin MD sent at 1/12/2022  6:29 PM CST -----  Please discuss lab results with Roland (see MyChart notes). Make follow up visit in 3 months for diabetes and cholesterol - come fasting.

## 2022-01-14 RX ORDER — ROSUVASTATIN CALCIUM 40 MG/1
40 TABLET, COATED ORAL AT BEDTIME
Qty: 90 TABLET | Refills: 3 | Status: SHIPPED | OUTPATIENT
Start: 2022-01-14 | End: 2023-03-10

## 2022-01-14 RX ORDER — METFORMIN HYDROCHLORIDE 750 MG/1
750 TABLET, EXTENDED RELEASE ORAL
Qty: 90 TABLET | Refills: 3 | Status: SHIPPED | OUTPATIENT
Start: 2022-01-14 | End: 2023-03-10 | Stop reason: DRUGHIGH

## 2022-01-14 NOTE — TELEPHONE ENCOUNTER
RN spoke to pt regarding Dr. Joaquin message below. Pt verbalizes understanding, agree with plan and has no further questions.    RN also gave Diabetes Educator scheduling number from referral [phone].    Closing encounter.    ANTOINETTE ValadezN, RN   [facility]

## 2022-01-14 NOTE — TELEPHONE ENCOUNTER
"  Your blood sugars are too high. You want your A1c to be less than 7%. Do you think you're able to change your diet and exercise now? Or would it make more sense to increase your medicine? Please let me know either way.   Written by Shahla Joaquin MD on 1/12/2022  6:28 PM CST  Your kidney tests are good.     Your liver tests are high again. You are having ongoing liver damage. This is due to nonalcoholic hepatosteatosis (KNOX). I would recommend avoiding alcohol completely. The only cure for this is weight loss. Please let me know if you'd be interested in seeing a liver doctor or weight loss specialist about this condition.   Written by Shahla Joaquin MD on 1/12/2022  6:27 PM CST    Your vitamin D is a tiny bit low. Add 1000 units of vitamin D every day.   Written by Shahla Joaquin MD on 1/12/2022  6:18 PM CST    Normal urine. No sign of kidney problems.   Written by Shahla Joaquin MD on 1/12/2022  6:17 PM CST  Your LDL should be less than 130  This is your \"bad\" cholesterol. Too much of this causes build up in the arteries. This can cause heart attack, stroke, kidney problems, and blocked arteries to the legs.     Your HDL should be more than 40.  This is your \"good\" cholesterol. It can help remove build up in arteries. A healthy level will help reduce your risk of heart attack and stroke.        Triglycerides should be less than 150.  If triglycerides are high they can increase the risk of heart attack and stroke.      To improve your cholesterol:  Take rosuvastatin 40 mg every day.  Limit red meat, fatty dairy (like whole milk, butter, and ice cream), and fried food.  Limit sugary foods and beverages (like juice, pop and sports drinks).  Eat more fruits, vegetables, whole grain (like brown rice, whole wheat bread, and oatmeal), fish and nuts.  Exercise. The goal is 150 minutes of moderate intensity exercise per week.  Lose weight. Even losing 10% of your weight will significantly improve your cholesterol. "   Written by Shahla Joaquin MD on 1/12/2022  6:22 PM CST    RN spoke to pt and relay the following lab results to pt. Pt verbalizes understanding, and agree with plan.    Pt states he will try to lose weight and is willing to try medication as recommended by PCP.    Routing back to PCP to review and advise.    NASRIN Valadez, RN   [facility]

## 2022-01-14 NOTE — TELEPHONE ENCOUNTER
Refilled rosuvastatin.  New rx sent for metformin  mg daily.  Referral made for diabetes educator.

## 2022-01-18 VITALS
RESPIRATION RATE: 16 BRPM | SYSTOLIC BLOOD PRESSURE: 110 MMHG | WEIGHT: 207 LBS | HEART RATE: 84 BPM | DIASTOLIC BLOOD PRESSURE: 74 MMHG | HEIGHT: 63 IN | BODY MASS INDEX: 36.68 KG/M2

## 2022-01-18 VITALS
DIASTOLIC BLOOD PRESSURE: 78 MMHG | TEMPERATURE: 98 F | RESPIRATION RATE: 16 BRPM | HEART RATE: 71 BPM | SYSTOLIC BLOOD PRESSURE: 125 MMHG | OXYGEN SATURATION: 98 %

## 2022-01-18 VITALS
DIASTOLIC BLOOD PRESSURE: 85 MMHG | HEART RATE: 86 BPM | SYSTOLIC BLOOD PRESSURE: 132 MMHG | HEART RATE: 81 BPM | BODY MASS INDEX: 34.9 KG/M2 | DIASTOLIC BLOOD PRESSURE: 82 MMHG | OXYGEN SATURATION: 97 % | DIASTOLIC BLOOD PRESSURE: 74 MMHG | SYSTOLIC BLOOD PRESSURE: 114 MMHG | RESPIRATION RATE: 16 BRPM | RESPIRATION RATE: 16 BRPM | TEMPERATURE: 97.6 F | WEIGHT: 197 LBS | OXYGEN SATURATION: 100 % | RESPIRATION RATE: 14 BRPM | OXYGEN SATURATION: 99 % | HEART RATE: 86 BPM | SYSTOLIC BLOOD PRESSURE: 138 MMHG

## 2022-01-18 VITALS
RESPIRATION RATE: 16 BRPM | HEART RATE: 80 BPM | SYSTOLIC BLOOD PRESSURE: 118 MMHG | WEIGHT: 196 LBS | BODY MASS INDEX: 36.07 KG/M2 | DIASTOLIC BLOOD PRESSURE: 80 MMHG | HEIGHT: 62 IN

## 2022-01-18 VITALS
OXYGEN SATURATION: 96 % | HEART RATE: 82 BPM | RESPIRATION RATE: 16 BRPM | BODY MASS INDEX: 36.61 KG/M2 | TEMPERATURE: 98.3 F | DIASTOLIC BLOOD PRESSURE: 84 MMHG | WEIGHT: 213.3 LBS | SYSTOLIC BLOOD PRESSURE: 149 MMHG

## 2022-01-18 VITALS
HEART RATE: 78 BPM | SYSTOLIC BLOOD PRESSURE: 144 MMHG | RESPIRATION RATE: 18 BRPM | DIASTOLIC BLOOD PRESSURE: 88 MMHG | TEMPERATURE: 97.8 F | BODY MASS INDEX: 37.93 KG/M2 | WEIGHT: 221 LBS | OXYGEN SATURATION: 97 %

## 2022-01-18 VITALS
DIASTOLIC BLOOD PRESSURE: 68 MMHG | WEIGHT: 196 LBS | BODY MASS INDEX: 36.07 KG/M2 | RESPIRATION RATE: 20 BRPM | SYSTOLIC BLOOD PRESSURE: 105 MMHG | HEART RATE: 67 BPM | HEIGHT: 62 IN | TEMPERATURE: 97.5 F

## 2022-01-18 VITALS — WEIGHT: 197 LBS | BODY MASS INDEX: 35.74 KG/M2

## 2022-01-18 ASSESSMENT — PATIENT HEALTH QUESTIONNAIRE - PHQ9: SUM OF ALL RESPONSES TO PHQ QUESTIONS 1-9: 0

## 2022-02-06 ENCOUNTER — HEALTH MAINTENANCE LETTER (OUTPATIENT)
Age: 42
End: 2022-02-06

## 2022-05-29 ENCOUNTER — HEALTH MAINTENANCE LETTER (OUTPATIENT)
Age: 42
End: 2022-05-29

## 2022-07-24 ENCOUNTER — HEALTH MAINTENANCE LETTER (OUTPATIENT)
Age: 42
End: 2022-07-24

## 2022-10-02 ENCOUNTER — HEALTH MAINTENANCE LETTER (OUTPATIENT)
Age: 42
End: 2022-10-02

## 2022-12-21 ENCOUNTER — TELEPHONE (OUTPATIENT)
Dept: FAMILY MEDICINE | Facility: CLINIC | Age: 42
End: 2022-12-21
Payer: COMMERCIAL

## 2022-12-21 DIAGNOSIS — E11.65 TYPE 2 DIABETES MELLITUS WITH HYPERGLYCEMIA, WITHOUT LONG-TERM CURRENT USE OF INSULIN (H): Primary | ICD-10-CM

## 2022-12-21 NOTE — TELEPHONE ENCOUNTER
Due for visit. Please ask Roland to come in for labs, shots, and BP check now.  Schedule for a physical in 3 months.    No future appointments.   Health Maintenance Due   Topic Date Due     YEARLY PREVENTIVE VISIT  Never done     ADVANCE CARE PLANNING  Never done     EYE EXAM  Never done     MICROALBUMIN  10/26/2021     Pneumococcal Vaccine: Pediatrics (0 to 5 Years) and At-Risk Patients (6 to 64 Years) (2 - PCV) 10/26/2021     COVID-19 Vaccine (3 - Booster for Moderna series) 11/08/2021     A1C  04/11/2022     DIABETIC FOOT EXAM  05/20/2022     INFLUENZA VACCINE (1) 09/01/2022     BMP  01/11/2023     LIPID  01/11/2023     BP Readings from Last 3 Encounters:   05/06/21 125/78   05/06/21 125/78   12/03/20 118/80     Diagnoses and all orders for this visit:    Type 2 diabetes mellitus with hyperglycemia, without long-term current use of insulin (H)  -     Hemoglobin A1c; Future  -     Comprehensive metabolic panel; Future  -     Lipid panel reflex to direct LDL Fasting; Future  -     Albumin Random Urine Quantitative with Creat Ratio; Future  -     Adult Eye  Referral; Future    Other orders  -     REVIEW OF HEALTH MAINTENANCE PROTOCOL ORDERS  -     Pneumococcal 20 Valent Conjugate (Prevnar 20); Future  -     INFLUENZA VACCINE IM > 6 MONTHS VALENT IIV4 (AFLURIA/FLUZONE); Future  -     COVID-19,PF,MODERNA BIVALENT (18+YRS); Future

## 2022-12-22 NOTE — TELEPHONE ENCOUNTER
Left message #1 at [phone]. Postponing task out to a week and will try again. If patient returns call back, please help patient schedule an appointment per message below. Thanks!

## 2023-01-02 NOTE — TELEPHONE ENCOUNTER
Left message #2 at [phone]. Sending letter out and postponing task out to 2 weeks and will try again if an appointment hasn't been made. If patient returns call back, please help patient schedule an appointment per message below. Thanks!

## 2023-01-04 ENCOUNTER — MYC MEDICAL ADVICE (OUTPATIENT)
Dept: FAMILY MEDICINE | Facility: CLINIC | Age: 43
End: 2023-01-04

## 2023-01-05 NOTE — TELEPHONE ENCOUNTER
RN attempted to call patient to assist him to make an appointment.     However, the phone numbers on the chart are no longer in service.     RN will send the message via [employer].        Sophia Felix RN  [facility]

## 2023-01-18 NOTE — TELEPHONE ENCOUNTER
Left message #3 at [phone]. If patient returns call back, please help patient schedule an appointment per message below. Thanks! We have made several attempts to contact patient by phone and letter to schedule an appointment. Unfortunately, our calls have not been returned and we were unable to schedule. At this time, we will no longer make an attempt to schedule this appointment. Completing task.

## 2023-03-08 ENCOUNTER — HOSPITAL ENCOUNTER (OUTPATIENT)
Dept: ULTRASOUND IMAGING | Facility: HOSPITAL | Age: 43
Discharge: HOME | End: 2023-03-08
Attending: FAMILY MEDICINE | Admitting: FAMILY MEDICINE
Payer: COMMERCIAL

## 2023-03-08 ENCOUNTER — OFFICE VISIT (OUTPATIENT)
Dept: FAMILY MEDICINE | Facility: CLINIC | Age: 43
End: 2023-03-08
Payer: COMMERCIAL

## 2023-03-08 ENCOUNTER — ANCILLARY PROCEDURE (OUTPATIENT)
Dept: GENERAL RADIOLOGY | Facility: CLINIC | Age: 43
End: 2023-03-08
Attending: FAMILY MEDICINE
Payer: COMMERCIAL

## 2023-03-08 VITALS
SYSTOLIC BLOOD PRESSURE: 126 MMHG | DIASTOLIC BLOOD PRESSURE: 83 MMHG | BODY MASS INDEX: 39.2 KG/M2 | WEIGHT: 213 LBS | OXYGEN SATURATION: 98 % | RESPIRATION RATE: 20 BRPM | HEART RATE: 70 BPM | HEIGHT: 62 IN

## 2023-03-08 DIAGNOSIS — K76.0 NONALCOHOLIC HEPATOSTEATOSIS: Chronic | ICD-10-CM

## 2023-03-08 DIAGNOSIS — R07.9 CHEST PAIN, UNSPECIFIED TYPE: ICD-10-CM

## 2023-03-08 DIAGNOSIS — E11.65 TYPE 2 DIABETES MELLITUS WITH HYPERGLYCEMIA, WITHOUT LONG-TERM CURRENT USE OF INSULIN (H): ICD-10-CM

## 2023-03-08 DIAGNOSIS — R10.84 ABDOMINAL PAIN, GENERALIZED: ICD-10-CM

## 2023-03-08 DIAGNOSIS — R07.9 CHEST PAIN, UNSPECIFIED TYPE: Primary | ICD-10-CM

## 2023-03-08 DIAGNOSIS — E55.9 VITAMIN D DEFICIENCY: ICD-10-CM

## 2023-03-08 DIAGNOSIS — L81.9 HYPERPIGMENTATION: ICD-10-CM

## 2023-03-08 PROBLEM — E66.811 OBESITY (BMI 30.0-34.9): Status: RESOLVED | Noted: 2020-10-08 | Resolved: 2023-03-08

## 2023-03-08 LAB
ALBUMIN SERPL BCG-MCNC: 4.7 G/DL (ref 3.5–5.2)
ALBUMIN UR-MCNC: NEGATIVE MG/DL
ALP SERPL-CCNC: 69 U/L (ref 40–129)
ALT SERPL W P-5'-P-CCNC: 70 U/L (ref 10–50)
AMYLASE SERPL-CCNC: 49 U/L (ref 28–100)
ANION GAP SERPL CALCULATED.3IONS-SCNC: 15 MMOL/L (ref 7–15)
APPEARANCE UR: CLEAR
AST SERPL W P-5'-P-CCNC: 46 U/L (ref 10–50)
BACTERIA #/AREA URNS HPF: ABNORMAL /HPF
BASOPHILS # BLD AUTO: 0.1 10E3/UL (ref 0–0.2)
BASOPHILS NFR BLD AUTO: 1 %
BILIRUB SERPL-MCNC: 0.9 MG/DL
BILIRUB UR QL STRIP: NEGATIVE
BUN SERPL-MCNC: 17.8 MG/DL (ref 6–20)
CALCIUM SERPL-MCNC: 9.8 MG/DL (ref 8.6–10)
CHLORIDE SERPL-SCNC: 102 MMOL/L (ref 98–107)
CHOLEST SERPL-MCNC: 303 MG/DL
COLOR UR AUTO: YELLOW
CREAT SERPL-MCNC: 1.01 MG/DL (ref 0.67–1.17)
CREAT UR-MCNC: 237 MG/DL
DEPRECATED HCO3 PLAS-SCNC: 21 MMOL/L (ref 22–29)
EOSINOPHIL # BLD AUTO: 0.1 10E3/UL (ref 0–0.7)
EOSINOPHIL NFR BLD AUTO: 1 %
ERYTHROCYTE [DISTWIDTH] IN BLOOD BY AUTOMATED COUNT: 13.2 % (ref 10–15)
FERRITIN SERPL-MCNC: 574 NG/ML (ref 31–409)
GFR SERPL CREATININE-BSD FRML MDRD: >90 ML/MIN/1.73M2
GLUCOSE SERPL-MCNC: 90 MG/DL (ref 70–99)
GLUCOSE UR STRIP-MCNC: NEGATIVE MG/DL
HBA1C MFR BLD: 7.2 % (ref 0–5.6)
HCT VFR BLD AUTO: 49.7 % (ref 40–53)
HDLC SERPL-MCNC: 37 MG/DL
HGB BLD-MCNC: 16 G/DL (ref 13.3–17.7)
HGB UR QL STRIP: NEGATIVE
IMM GRANULOCYTES # BLD: 0 10E3/UL
IMM GRANULOCYTES NFR BLD: 0 %
KETONES UR STRIP-MCNC: NEGATIVE MG/DL
LDLC SERPL CALC-MCNC: 246 MG/DL
LEUKOCYTE ESTERASE UR QL STRIP: NEGATIVE
LIPASE SERPL-CCNC: 27 U/L (ref 13–60)
LYMPHOCYTES # BLD AUTO: 4.2 10E3/UL (ref 0.8–5.3)
LYMPHOCYTES NFR BLD AUTO: 53 %
MCH RBC QN AUTO: 27.8 PG (ref 26.5–33)
MCHC RBC AUTO-ENTMCNC: 32.2 G/DL (ref 31.5–36.5)
MCV RBC AUTO: 86 FL (ref 78–100)
MICROALBUMIN UR-MCNC: <12 MG/L
MICROALBUMIN/CREAT UR: NORMAL MG/G{CREAT}
MONOCYTES # BLD AUTO: 0.5 10E3/UL (ref 0–1.3)
MONOCYTES NFR BLD AUTO: 6 %
NEUTROPHILS # BLD AUTO: 3.1 10E3/UL (ref 1.6–8.3)
NEUTROPHILS NFR BLD AUTO: 39 %
NITRATE UR QL: NEGATIVE
NONHDLC SERPL-MCNC: 266 MG/DL
NRBC # BLD AUTO: 0 10E3/UL
NRBC BLD AUTO-RTO: 0 /100
PH UR STRIP: 6 [PH] (ref 5–8)
PLATELET # BLD AUTO: 302 10E3/UL (ref 150–450)
POTASSIUM SERPL-SCNC: 4.6 MMOL/L (ref 3.4–5.3)
PROT SERPL-MCNC: 8.1 G/DL (ref 6.4–8.3)
RBC # BLD AUTO: 5.76 10E6/UL (ref 4.4–5.9)
RBC #/AREA URNS AUTO: ABNORMAL /HPF
SODIUM SERPL-SCNC: 138 MMOL/L (ref 136–145)
SP GR UR STRIP: 1.02 (ref 1–1.03)
TRIGL SERPL-MCNC: 102 MG/DL
TROPONIN T SERPL HS-MCNC: 12 NG/L
TSH SERPL DL<=0.005 MIU/L-ACNC: 3.53 UIU/ML (ref 0.3–4.2)
UROBILINOGEN UR STRIP-ACNC: 0.2 E.U./DL
WBC # BLD AUTO: 7.9 10E3/UL (ref 4–11)
WBC #/AREA URNS AUTO: ABNORMAL /HPF

## 2023-03-08 PROCEDURE — 99215 OFFICE O/P EST HI 40 MIN: CPT | Performed by: FAMILY MEDICINE

## 2023-03-08 PROCEDURE — 93005 ELECTROCARDIOGRAM TRACING: CPT | Performed by: FAMILY MEDICINE

## 2023-03-08 PROCEDURE — 83690 ASSAY OF LIPASE: CPT | Performed by: FAMILY MEDICINE

## 2023-03-08 PROCEDURE — 82570 ASSAY OF URINE CREATININE: CPT | Performed by: FAMILY MEDICINE

## 2023-03-08 PROCEDURE — 82306 VITAMIN D 25 HYDROXY: CPT | Performed by: FAMILY MEDICINE

## 2023-03-08 PROCEDURE — 83036 HEMOGLOBIN GLYCOSYLATED A1C: CPT | Performed by: FAMILY MEDICINE

## 2023-03-08 PROCEDURE — 84484 ASSAY OF TROPONIN QUANT: CPT | Performed by: FAMILY MEDICINE

## 2023-03-08 PROCEDURE — 76705 ECHO EXAM OF ABDOMEN: CPT

## 2023-03-08 PROCEDURE — 93010 ELECTROCARDIOGRAM REPORT: CPT | Performed by: GENERAL ACUTE CARE HOSPITAL

## 2023-03-08 PROCEDURE — 80061 LIPID PANEL: CPT | Performed by: FAMILY MEDICINE

## 2023-03-08 PROCEDURE — 81001 URINALYSIS AUTO W/SCOPE: CPT | Performed by: FAMILY MEDICINE

## 2023-03-08 PROCEDURE — 71046 X-RAY EXAM CHEST 2 VIEWS: CPT | Mod: TC | Performed by: RADIOLOGY

## 2023-03-08 PROCEDURE — 82150 ASSAY OF AMYLASE: CPT | Performed by: FAMILY MEDICINE

## 2023-03-08 PROCEDURE — 82043 UR ALBUMIN QUANTITATIVE: CPT | Performed by: FAMILY MEDICINE

## 2023-03-08 PROCEDURE — 90677 PCV20 VACCINE IM: CPT | Performed by: FAMILY MEDICINE

## 2023-03-08 PROCEDURE — 36415 COLL VENOUS BLD VENIPUNCTURE: CPT | Performed by: FAMILY MEDICINE

## 2023-03-08 PROCEDURE — 90471 IMMUNIZATION ADMIN: CPT | Performed by: FAMILY MEDICINE

## 2023-03-08 PROCEDURE — 82728 ASSAY OF FERRITIN: CPT | Performed by: FAMILY MEDICINE

## 2023-03-08 PROCEDURE — 80050 GENERAL HEALTH PANEL: CPT | Performed by: FAMILY MEDICINE

## 2023-03-08 ASSESSMENT — ENCOUNTER SYMPTOMS: BACK PAIN: 1

## 2023-03-08 NOTE — PROGRESS NOTES
[facility] Visit  Phone : none    Assessment/Plan:  Chest pain, unspecified type  High risk for heart/vascular issues.  EKG reviewed by provider today is reassuring- no acute changes.  Will await official reading.  CXR is grossly normal- no obvious cause for his pain.  Trop pending.  To ER if trop elevated.  GI cause for pain as below.    - EKG 12-lead, tracing only  - XR Chest 2 Views  - Troponin T, High Sensitivity  - Troponin T, High Sensitivity    Vitamin D deficiency  Check levels and replace as indicated.    - Vitamin D Deficiency  - Vitamin D Deficiency    Hyperpigmentation  With his diabetes and increased hyperpigmentation wonder about iron deposition- screen with ferritin- hemachromatosis.  Addisons? Hyperglycemia/diabetic? Thyroid?   - Ferritin  - Ferritin    Abdominal pain, generalized  Check labs to evaluate for liver/pancreas/renal/urinar/infectious causes.  Constipation.  Check hypylori due to h/o this in the past and component of gastric pain.  Start PPI   - UA with Microscopic reflex to Culture - Clinic Collect  - CBC with platelets and differential  - Amylase  - Lipase  - TSH with free T4 reflex  - Helicobacter pylori Antigen Stool  - CBC with platelets and differential  - Amylase  - Lipase  - TSH with free T4 reflex  - UA Microscopic with Reflex to Culture  - CT Abdomen Pelvis w Contrast    Type 2 diabetes mellitus with hyperglycemia, without long-term current use of insulin (H)   Controlled.  Addressed smoking status and aspirin therapy.  Recommended annual eye exam and dental cares. Reviewed foot cares and foot exam.  Blood pressure and lipid management reviewed today.  Vaccines reviewed and updated.  Plan for glucose management includes ongoing focus on healthy diabetic diet and increased activity, continue metformin 2000mg daily (not sure if he is on XR or not?).  Labs ordered as below including:     Hemoglobin A1C   Date Value Ref Range Status   03/08/2023  7.2 (H) 0.0 - 5.6 % Final     Comment:     Normal <5.7%   Prediabetes 5.7-6.4%    Diabetes 6.5% or higher     Note: Adopted from ADA consensus guidelines.   01/11/2022 7.3 (H) 0.0 - 5.6 % Final     Comment:     Normal <5.7%   Prediabetes 5.7-6.4%    Diabetes 6.5% or higher     Note: Adopted from ADA consensus guidelines.   02/11/2021 5.3 <=5.6 % Final    ,   Lab Results   Component Value Date     (H) 03/08/2023   ,   Creatinine   Date Value Ref Range Status   03/08/2023 1.01 0.67 - 1.17 mg/dL Final        - Hemoglobin A1c  - Comprehensive metabolic panel  - Lipid panel reflex to direct LDL Fasting  - Albumin Random Urine Quantitative with Creat Ratio      Return in about 6 weeks (around 4/19/2023) for Office Visit.      Seamus Roland is a 42 year old accompanied by his spouse, presenting for the following health issues:  Back Pain  starting in butt cheek to the lower ribs and then neck up.  Worried about spleen and gallbladder- upper abd is painful as well.  Still having problems with his throat starting back again last week.  Skin is very itchy.  Lots of hiccups and then will get dry cough. Many of these problems [facility]  Nauseated easily.  Feels like he is going to vomit every time he eats.  Sweats easily.  No fever but feels hot and chills. Neck is tight and chest is tight and feels like the heart is irregular when his chest hurts.  Left eye is twitching.  Possible sinus infection.    Exercising daily 1hr but not able to loose weight.  Trying to only eat once daily and mostly veggies and not loosing weight.  Body will get tight from neck down.  Left arm will get numb and tingly at times from fingertips to elbow on both sides.  Diarrhea at times over past week.  BM is thin at times and normal at times.     H/o h pylori in the past     Meds: tylenol.    Not on any other meds currently- ran out.      Back Pain     History of Present Illness       Back Pain:  He  "presents for follow up of back pain. Patient's back pain is a recurring problem.  Location of back pain:  Right lower back, left lower back, right upper back, left upper back, right side of neck, left side of neck, right shoulder, left shoulder, right buttock, left buttock, right hip, left hip, right side of waist and left side of waist  Description of back pain: burning and cramping  Back pain spreads: right buttocks, left buttocks, right thigh, left thigh, right knee, left knee, right foot, left foot, right shoulder, left shoulder, right side of neck and left side of neck    Since patient first noticed back pain, pain is: rapidly worsening  Does back pain interfere with his job:  Yes      Reason for visit:  Illness  Symptom onset:  3-7 days ago  Symptoms include:  Gallbladder  Symptom intensity:  Moderate  Symptom progression:  Worsening  Had these symptoms before:  No  What makes it worse:  Dizzy, eating, bloat  What makes it better:  No    He eats 4 or more servings of fruits and vegetables daily.He consumes 0 sweetened beverage(s) daily.He exercises with enough effort to increase his heart rate 30 to 60 minutes per day.  He exercises with enough effort to increase his heart rate 7 days per week.   He is taking medications regularly.         Review of Systems   Musculoskeletal: Positive for back pain.          Objective    /83 (BP Location: Right arm, Patient Position: Sitting, Cuff Size: Adult Large)   Pulse 70   Resp 20   Ht 1.58 m (5' 2.21\")   Wt 96.6 kg (213 lb)   SpO2 98%   BMI 38.70 kg/m    Body mass index is 38.7 kg/m .  Physical Exam   Wt Readings from Last 3 Encounters:   03/08/23 96.6 kg (213 lb)   12/04/20 89.4 kg (197 lb)   12/03/20 88.9 kg (196 lb)       No LMP for male patient.    All normal as below except abnormalities include: hyperpigmented skin on head/neck/upper shoulder/chest/back area.  Epigastric and mid abdominal pain on palpation.    General is a  42 year old sitting " comfortably in no apparent distress wearing a mask   HEENT:  TM are clear bilaterally.  Eye exams within normal   Neck: Supple without lymphadenopathy or thyromegally  CV: Regular rate and rhythm S1S2 without rubs, murmurs or gallops,   Lungs: Clear to auscultation bilaterally  Abd:  +BS, soft ND,  No masses or organomegally,   Extremities: Warm, No Edema, 2+ Pedal and radial pulses bilaterally  Skin: No lesions or rashes noted  Neuro: Able to ambulate around the exam room with equal movement, strength and normal coordination of the upper and lower extremeties symmetrically      Independent historian: wife helped with dates and timing and details of some questions.     History summarized from1-2:last office visit 5/2021 reviewed- hasn't seen provider since then.virtual visit for change in stools.   Recommend GI follow-up for stool changes.  Possible pelvic floor dysfunction.    Old Records-1: Outside allergies, meds, problems and immunizations were reconciled as needed from [facility]  Radiology tests reviewed-1: no back xray on file   Lab tests reviewed-1: 1/2022- last set of labs   No UTI on culture   Medicine tests reviewed-1: treadmill stress test 2020 reviewed   Result Text     The nuclear stress test is negative for inducible myocardial ischemia or infarction.     The left ventricular ejection fraction at stress is 68%.     There is no prior study for comparison.    Radha Matthews MD

## 2023-03-09 ENCOUNTER — LAB (OUTPATIENT)
Dept: LAB | Facility: CLINIC | Age: 43
End: 2023-03-09
Payer: COMMERCIAL

## 2023-03-09 DIAGNOSIS — R10.84 ABDOMINAL PAIN, GENERALIZED: ICD-10-CM

## 2023-03-09 LAB — DEPRECATED CALCIDIOL+CALCIFEROL SERPL-MC: 22 UG/L (ref 20–75)

## 2023-03-09 PROCEDURE — 87338 HPYLORI STOOL AG IA: CPT

## 2023-03-10 ENCOUNTER — MYC MEDICAL ADVICE (OUTPATIENT)
Dept: FAMILY MEDICINE | Facility: CLINIC | Age: 43
End: 2023-03-10

## 2023-03-10 DIAGNOSIS — E11.65 TYPE 2 DIABETES MELLITUS WITH HYPERGLYCEMIA, WITHOUT LONG-TERM CURRENT USE OF INSULIN (H): ICD-10-CM

## 2023-03-10 DIAGNOSIS — E78.2 MIXED HYPERLIPIDEMIA: Primary | ICD-10-CM

## 2023-03-10 DIAGNOSIS — E11.65 TYPE 2 DIABETES MELLITUS WITH HYPERGLYCEMIA, WITHOUT LONG-TERM CURRENT USE OF INSULIN (H): Primary | ICD-10-CM

## 2023-03-10 DIAGNOSIS — E78.5 DYSLIPIDEMIA, GOAL LDL BELOW 70: ICD-10-CM

## 2023-03-10 LAB
ATRIAL RATE - MUSE: 68 BPM
DIASTOLIC BLOOD PRESSURE - MUSE: NORMAL MMHG
H PYLORI AG STL QL IA: NEGATIVE
INTERPRETATION ECG - MUSE: NORMAL
P AXIS - MUSE: 15 DEGREES
PR INTERVAL - MUSE: 162 MS
QRS DURATION - MUSE: 92 MS
QT - MUSE: 420 MS
QTC - MUSE: 446 MS
R AXIS - MUSE: 60 DEGREES
SYSTOLIC BLOOD PRESSURE - MUSE: NORMAL MMHG
T AXIS - MUSE: 0 DEGREES
VENTRICULAR RATE- MUSE: 68 BPM

## 2023-03-10 RX ORDER — ROSUVASTATIN CALCIUM 40 MG/1
40 TABLET, COATED ORAL AT BEDTIME
Qty: 90 TABLET | Refills: 3 | Status: SHIPPED | OUTPATIENT
Start: 2023-03-10 | End: 2024-05-16

## 2023-03-10 RX ORDER — METFORMIN HYDROCHLORIDE 1000 MG/1
2000 TABLET, FILM COATED, EXTENDED RELEASE ORAL
Qty: 180 TABLET | Refills: 4 | Status: SHIPPED | OUTPATIENT
Start: 2023-03-10 | End: 2023-05-01

## 2023-03-10 NOTE — TELEPHONE ENCOUNTER
Contacted patient and scheduled a lab only appointment on 4/24/23.    No further action needed.    Jeanne Palma RN  [facility]      Dr. Jemal Omalley refilled your cholesterol medicine (Crestor). Please call the [facility] pharmacy on [address] to see when you can pick this up. You should be taking this every day. Call us at [phone] if you have any questions.     Have a great weekend,  Barb WILLIAMSON      I am out of those medication. I didn't hear from my pharmacist regarding refill so I thought that was the end of it.      ----- Message from Shahla Joaquin MD sent at 3/10/2023  8:37 AM CST -----  Please touch base with Luan. His cholesterol is very high. LDL should be less than 130 and he's at a very high level of 246. What's going on? Does he need cholesterol medicine? I'd like him to come in to recheck cholesterol in 6 weeks. He can do it a week before his visit in May.  5/1/2023   7:40 AM    Shahla Joaquin MD         [facility]

## 2023-03-16 ENCOUNTER — HOSPITAL ENCOUNTER (OUTPATIENT)
Dept: CT IMAGING | Facility: HOSPITAL | Age: 43
Discharge: HOME | End: 2023-03-16
Attending: FAMILY MEDICINE | Admitting: FAMILY MEDICINE
Payer: COMMERCIAL

## 2023-03-16 DIAGNOSIS — R10.84 ABDOMINAL PAIN, GENERALIZED: ICD-10-CM

## 2023-03-16 PROCEDURE — 250N000011 HC RX IP 250 OP 636: Performed by: FAMILY MEDICINE

## 2023-03-16 PROCEDURE — 74177 CT ABD & PELVIS W/CONTRAST: CPT

## 2023-03-16 RX ORDER — IOPAMIDOL 755 MG/ML
100 INJECTION, SOLUTION INTRAVASCULAR ONCE
Status: COMPLETED | OUTPATIENT
Start: 2023-03-16 | End: 2023-03-16

## 2023-03-16 RX ADMIN — IOPAMIDOL 100 ML: 755 INJECTION, SOLUTION INTRAVENOUS at 07:25

## 2023-04-03 ENCOUNTER — VIRTUAL VISIT (OUTPATIENT)
Dept: EDUCATION SERVICES | Facility: CLINIC | Age: 43
End: 2023-04-03

## 2023-04-03 DIAGNOSIS — E11.65 TYPE 2 DIABETES MELLITUS WITH HYPERGLYCEMIA, WITHOUT LONG-TERM CURRENT USE OF INSULIN (H): ICD-10-CM

## 2023-04-24 ENCOUNTER — LAB (OUTPATIENT)
Dept: LAB | Facility: CLINIC | Age: 43
End: 2023-04-24
Payer: COMMERCIAL

## 2023-04-24 DIAGNOSIS — E78.2 MIXED HYPERLIPIDEMIA: ICD-10-CM

## 2023-04-24 DIAGNOSIS — E11.65 TYPE 2 DIABETES MELLITUS WITH HYPERGLYCEMIA, WITHOUT LONG-TERM CURRENT USE OF INSULIN (H): ICD-10-CM

## 2023-04-24 LAB
CHOLEST SERPL-MCNC: 229 MG/DL
HBA1C MFR BLD: 6.3 % (ref 0–5.6)
HDLC SERPL-MCNC: 38 MG/DL
LDLC SERPL CALC-MCNC: 138 MG/DL
NONHDLC SERPL-MCNC: 191 MG/DL
TRIGL SERPL-MCNC: 266 MG/DL

## 2023-04-24 PROCEDURE — 83036 HEMOGLOBIN GLYCOSYLATED A1C: CPT

## 2023-04-24 PROCEDURE — 80061 LIPID PANEL: CPT

## 2023-04-24 PROCEDURE — 36415 COLL VENOUS BLD VENIPUNCTURE: CPT

## 2023-05-01 ENCOUNTER — OFFICE VISIT (OUTPATIENT)
Dept: FAMILY MEDICINE | Facility: CLINIC | Age: 43
End: 2023-05-01
Payer: COMMERCIAL

## 2023-05-01 VITALS
TEMPERATURE: 97.6 F | HEIGHT: 62 IN | BODY MASS INDEX: 39.75 KG/M2 | RESPIRATION RATE: 16 BRPM | HEART RATE: 76 BPM | WEIGHT: 216 LBS | OXYGEN SATURATION: 95 % | DIASTOLIC BLOOD PRESSURE: 80 MMHG | SYSTOLIC BLOOD PRESSURE: 129 MMHG

## 2023-05-01 DIAGNOSIS — R93.2 ABNORMAL LIVER CT: ICD-10-CM

## 2023-05-01 DIAGNOSIS — K76.0 NONALCOHOLIC HEPATOSTEATOSIS: Primary | Chronic | ICD-10-CM

## 2023-05-01 DIAGNOSIS — K21.00 GASTROESOPHAGEAL REFLUX DISEASE WITH ESOPHAGITIS WITHOUT HEMORRHAGE: ICD-10-CM

## 2023-05-01 DIAGNOSIS — E78.2 MIXED HYPERLIPIDEMIA: ICD-10-CM

## 2023-05-01 DIAGNOSIS — E66.01 MORBID OBESITY (H): ICD-10-CM

## 2023-05-01 DIAGNOSIS — E11.65 TYPE 2 DIABETES MELLITUS WITH HYPERGLYCEMIA, WITHOUT LONG-TERM CURRENT USE OF INSULIN (H): ICD-10-CM

## 2023-05-01 PROCEDURE — 99214 OFFICE O/P EST MOD 30 MIN: CPT | Performed by: FAMILY MEDICINE

## 2023-05-01 ASSESSMENT — PAIN SCALES - GENERAL: PAINLEVEL: NO PAIN (0)

## 2023-05-01 NOTE — PROGRESS NOTES
Office Visit  [facility]  Date of Service: May 1, 2023  Assessment & Plan     1. KNOX with possible cirrhosis. Discussed ongoing weight loss. Target weight <140 lb (current weight 216 lb).    Referral to GI to further evaluate liver disease.  2. Familial hyperlipidemia with severe elevation of LDL. Current calculated 10 year risk 4.2%, lifetime risk 50% (on statin).    On rosuvastatin 40 with good response ( ->138). Discussed importance of lifelong statin therapy    Referral to cardiology - lipid specialist to discuss long term risk and primary prevention. Consider genetics.  3. DM2, well controlled with lifestyle modification alone.     Discussed low carb, heart-healthy diet and exercise.  4.   Obesity, BMI 39. Complicated by diabetes, hyperlipidemia, KNOX.    Current weight 216 lb, target weight <140.    Referral to bariatric care.  5.   GERD. Significantly improved with diet modification. Stop omeprazole.    Order Summary                                                      Nonalcoholic hepatosteatosis  -     Adult GI  Referral - Consult Only; Future  -     Adult Comprehensive Weight Management  Referral; Future    Abnormal liver CT  -     Adult GI  Referral - Consult Only; Future  -     Adult Comprehensive Weight Management  Referral; Future    Type 2 diabetes mellitus with hyperglycemia, without long-term current use of insulin (H)  -     Adult Eye  Referral; Future  -     Adult Comprehensive Weight Management  Referral; Future    Mixed hyperlipidemia  -     Adult Cardiology Eval  Referral; Future  -     Lipoprotein (a); Future  -     Adult Comprehensive Weight Management  Referral; Future    Gastroesophageal reflux disease with esophagitis without hemorrhage  -     Adult Comprehensive Weight Management  Referral; Future    Obesity (BMI 35.0-39.9) with comorbidity (H)  -     Adult  Comprehensive Weight Management  Referral; Future    Other orders  -     PRIMARY CARE FOLLOW-UP SCHEDULING; Future            No future appointments.    Completed by: Shahla Joaquin M.D., [facility]. 5/1/2023 10:34 AM. MDM: [place] neighborhood: [place] [address], language: English, MDM: reviewed results of each unique test.  Subjective   Luan Morgan is a 42 year old male who presents for   Chief Complaint   Patient presents with     Diabetes     Stop  taking medication for diabetes for over a month      Back Pain     On going     Here for follow up of chronic conditions. Off diabetes medication. Cut back on white rice and sugars have been in the 80s.    Answers for HPI/ROS submitted by the patient on 5/1/2023  Are you regularly taking any medication or supplement to lower your cholesterol?: Yes  Are you having muscle aches or other side effects that you think could be caused by your cholesterol lowering medication?: No  Do you check your blood pressure regularly outside of the clinic?: Yes  Are your blood pressures ever more than 140 on the top number (systolic) OR more than 90 on the bottom number (diastolic)? (For example, greater than 140/90): Yes  Are you following a low salt diet?: Yes  Frequency of checking blood sugars:: two times daily  What time of day are you checking your blood sugars : before and after meals  Have you had any blood sugars above 200?: No  Have you had any blood sugars below 70?: No  Hypoglycemia symptoms:: shakiness, dizziness, weakness  Diabetic concerns:: frequent infections, low blood sugar, several less than 70 in the past few weeks  Paraesthesia present:: numbness in feet, burning in feet, excessive thirst  Have you had a diabetic eye exam within the last year?: No  Your back pain is: recurring  Where is your back pain located? : right side of waist, left side of waist, other  How would you describe your back pain? : burning, cramping  Where does your  "back pain spread? : right foot, right shoulder, left shoulder, right side of neck, left side of neck  Since you noticed your back pain, how has it changed? : always present, but gets better and worse  Does your back pain interfere with your job?: Yes  If yes, which:: Chiropractor, cold, heat, massage, rest  What is the reason for your visit today? : check up  How many servings of fruits and vegetables do you eat daily?: 4 or more  On average, how many sweetened beverages do you drink each day (Examples: soda, juice, sweet tea, etc.  Do NOT count diet or artificially sweetened beverages)?: 1  How many minutes a day do you exercise enough to make your heart beat faster?: 30 to 60  How many days a week do you exercise enough to make your heart beat faster?: 7  How many days per week do you miss taking your medication?: 1  What makes it hard for you to take your medication every day?: side effects      Objective   /80   Pulse 76   Temp 97.6  F (36.4  C) (Temporal)   Resp 16   Ht 1.58 m (5' 2.21\")   Wt 98 kg (216 lb)   SpO2 95%   BMI 39.25 kg/m   He reports that he quit smoking about 10 years ago. His smoking use included cigarettes. He has a 52.50 pack-year smoking history. He has been exposed to tobacco smoke. He has never used smokeless tobacco.    Gen: Alert, no apparent distress.  Lab on 04/24/2023   Component Date Value Ref Range Status     Cholesterol 04/24/2023 229 (H)  <200 mg/dL Final     Triglycerides 04/24/2023 266 (H)  <150 mg/dL Final     Direct Measure HDL 04/24/2023 38 (L)  >=40 mg/dL Final     LDL Cholesterol Calculated 04/24/2023 138 (H)  <=100 mg/dL Final     Non HDL Cholesterol 04/24/2023 191 (H)  <130 mg/dL Final     Hemoglobin A1C 04/24/2023 6.3 (H)  0.0 - 5.6 % Final    Normal <5.7%   Prediabetes 5.7-6.4%    Diabetes 6.5% or higher     Note: Adopted from ADA consensus guidelines.   Lab on 03/09/2023   Component Date Value Ref Range Status     Helicobacter pylori Antigen Stool " 03/09/2023 Negative  Negative Final    Negative for Helicobacter pylori antigen by enzyme immunoassay. A negative result indicates the absence of H. pylori antigen or that the level of antigen is below the level of detection.   Office Visit on 03/08/2023   Component Date Value Ref Range Status     Color Urine 03/08/2023 Yellow  Colorless, Straw, Light Yellow, Yellow Final     Appearance Urine 03/08/2023 Clear  Clear Final     Glucose Urine 03/08/2023 Negative  Negative mg/dL Final     Bilirubin Urine 03/08/2023 Negative  Negative Final     Ketones Urine 03/08/2023 Negative  Negative mg/dL Final     Specific Gravity Urine 03/08/2023 1.020  1.005 - 1.030 Final     Blood Urine 03/08/2023 Negative  Negative Final     pH Urine 03/08/2023 6.0  5.0 - 8.0 Final     Protein Albumin Urine 03/08/2023 Negative  Negative mg/dL Final     Urobilinogen Urine 03/08/2023 0.2  0.2, 1.0 E.U./dL Final     Nitrite Urine 03/08/2023 Negative  Negative Final     Leukocyte Esterase Urine 03/08/2023 Negative  Negative Final     Ventricular Rate 03/08/2023 68  BPM Final     Atrial Rate 03/08/2023 68  BPM Final     FL Interval 03/08/2023 162  ms Final     QRS Duration 03/08/2023 92  ms Final     QT 03/08/2023 420  ms Final     QTc 03/08/2023 446  ms Final     P Axis 03/08/2023 15  degrees Final     R AXIS 03/08/2023 60  degrees Final     T Beck[place] 03/08/2023 0  degrees Final     Interpretation ECG 03/08/2023    Final                    Value:Sinus rhythm  Normal ECG  No previous ECGs available  Confirmed by GUILLERMO YUEN MD LOC:[facility] ([id number]) on 3/10/2023 8:27:02 AM       Hemoglobin A1C 03/08/2023 7.2 (H)  0.0 - 5.6 % Final    Normal <5.7%   Prediabetes 5.7-6.4%    Diabetes 6.5% or higher     Note: Adopted from ADA consensus guidelines.     Sodium 03/08/2023 138  136 - 145 mmol/L Final     Potassium 03/08/2023 4.6  3.4 - 5.3 mmol/L Final     Chloride 03/08/2023 102  98 - 107 mmol/L Final     Carbon Dioxide (CO2) 03/08/2023 21 (L)  22 - 29 mmol/L Final      Anion Gap 03/08/2023 15  7 - 15 mmol/L Final     Urea Nitrogen 03/08/2023 17.8  6.0 - 20.0 mg/dL Final     Creatinine 03/08/2023 1.01  0.67 - 1.17 mg/dL Final     Calcium 03/08/2023 9.8  8.6 - 10.0 mg/dL Final     Glucose 03/08/2023 90  70 - 99 mg/dL Final     Alkaline Phosphatase 03/08/2023 69  40 - 129 U/L Final     AST 03/08/2023 46  10 - 50 U/L Final     ALT 03/08/2023 70 (H)  10 - 50 U/L Final     Protein Total 03/08/2023 8.1  6.4 - 8.3 g/dL Final     Albumin 03/08/2023 4.7  3.5 - 5.2 g/dL Final     Bilirubin Total 03/08/2023 0.9  <=1.2 mg/dL Final     GFR Estimate 03/08/2023 >90  >60 mL/min/1.73m2 Final    eGFR calculated using 2021 CKD-EPI equation.     Cholesterol 03/08/2023 303 (H)  <200 mg/dL Final     Triglycerides 03/08/2023 102  <150 mg/dL Final     Direct Measure HDL 03/08/2023 37 (L)  >=40 mg/dL Final     LDL Cholesterol Calculated 03/08/2023 246 (H)  <=100 mg/dL Final     Non HDL Cholesterol 03/08/2023 266 (H)  <130 mg/dL Final     Amylase 03/08/2023 49  28 - 100 U/L Final     Lipase 03/08/2023 27  13 - 60 U/L Final     TSH 03/08/2023 3.53  0.30 - 4.20 uIU/mL Final     Troponin T, High Sensitivity 03/08/2023 12  <=22 ng/L Final    Either a High Sensitivity Troponin T baseline (0 hours) value = 100 ng/L, or an increase in High Sensitivity Troponin T = 7 ng/L at 2 hours compared to 0 hours (2-0 hours), suggests myocardial injury, and urgent clinical attention is required.    If the 2-0 hours increase is <7 ng/L, a High Sensitivity Troponin T result above gender-specific reference ranges warrants further evaluation.   Recommendations for further evaluation include correlation with clinical decision-making tool (e.g., HEART), a 3rd High Sensitivity Troponin T test 2 hours after the 2nd (a 20% change from baseline would represent concern), admission for observation, close PCC/cardiology follow-up, or urgent outpatient provocative testing.     Ferritin 03/08/2023 574 (H)  31 - 409 ng/mL Final      Vitamin D, Total (25-Hydroxy) 03/08/2023 22  20 - 75 ug/L Final     WBC Count 03/08/2023 7.9  4.0 - 11.0 10e3/uL Final     RBC Count 03/08/2023 5.76  4.40 - 5.90 10e6/uL Final     Hemoglobin 03/08/2023 16.0  13.3 - 17.7 g/dL Final     Hematocrit 03/08/2023 49.7  40.0 - 53.0 % Final     MCV 03/08/2023 86  78 - 100 fL Final     MCH 03/08/2023 27.8  26.5 - 33.0 pg Final     MCHC 03/08/2023 32.2  31.5 - 36.5 g/dL Final     RDW 03/08/2023 13.2  10.0 - 15.0 % Final     Platelet Count 03/08/2023 302  150 - 450 10e3/uL Final     % Neutrophils 03/08/2023 39  % Final     % Lymphocytes 03/08/2023 53  % Final     % Monocytes 03/08/2023 6  % Final     % Eosinophils 03/08/2023 1  % Final     % Basophils 03/08/2023 1  % Final     % Immature Granulocytes 03/08/2023 0  % Final     NRBCs per 100 WBC 03/08/2023 0  <1 /100 Final     Absolute Neutrophils 03/08/2023 3.1  1.6 - 8.3 10e3/uL Final     Absolute Lymphocytes 03/08/2023 4.2  0.8 - 5.3 10e3/uL Final     Absolute Monocytes 03/08/2023 0.5  0.0 - 1.3 10e3/uL Final     Absolute Eosinophils 03/08/2023 0.1  0.0 - 0.7 10e3/uL Final     Absolute Basophils 03/08/2023 0.1  0.0 - 0.2 10e3/uL Final     Absolute Immature Granulocytes 03/08/2023 0.0  <=0.4 10e3/uL Final     Absolute NRBCs 03/08/2023 0.0  10e3/uL Final     Creatinine Urine mg/dL 03/08/2023 237.0  mg/dL Final    The reference ranges have not been established in urine creatinine. The results should be integrated into the clinical context for interpretation.     Albumin Urine mg/L 03/08/2023 <12.0  mg/L Final    The reference ranges have not been established in urine albumin. The results should be integrated into the clinical context for interpretation.     Albumin Urine mg/g Cr 03/08/2023    Final    Unable to calculate, urine albumin and/or urine creatinine is outside detectable limits.  Microalbuminuria is defined as an albumin:creatinine ratio of 17 to 299 for males and 25 to 299 for females. A ratio of albumin:creatinine of  300 or higher is indicative of overt proteinuria.  Due to biologic variability, positive results should be confirmed by a second, first-morning random or 24-hour timed urine specimen. If there is discrepancy, a third specimen is recommended. When 2 out of 3 results are in the microalbuminuria range, this is evidence for incipient nephropathy and warrants increased efforts at glucose control, blood pressure control, and institution of therapy with an angiotensin-converting-enzyme (ACE) inhibitor (if the patient can tolerate it).       Bacteria Urine 03/08/2023 Few (A)  None Seen /HPF Final     RBC Urine 03/08/2023 None Seen  0-2 /HPF /HPF Final     WBC Urine 03/08/2023 None Seen  0-5 /HPF /HPF Final       No results found for any visits on 05/01/23.   The 10-year ASCVD risk score (Yancy BROOKS, et al., 2019) is: 4.2%    Values used to calculate the score:      Age: 42 years      Sex: Male      Is Non- : No      Diabetic: Yes      Tobacco smoker: No      Systolic Blood Pressure: 129 mmHg      Is BP treated: No      HDL Cholesterol: 38 mg/dL      Total Cholesterol: 229 mg/dL

## 2023-05-05 NOTE — LETTER
May 5, 2023  Re: Luan Morgan  YOB: 1980    Dear Colleague,    Thank you for your referral to the [facility] [place]. We have been unable to reach the patient after multiple contact attempts.      If you have any questions or concerns, please contact our office at Dept: [phone].        Sincerely,  [facility]

## 2023-05-05 NOTE — LETTER
Luan Morgan  [address] [place] [address]      May 22, 2023      Dear Luan,    As a valued [facility] patient, your healthcare needs are our priority.    Your health care team has determined that you are due for an appointment  Heart,Hepatology, and weight Mangement .   We encourage you to call or schedule an appointment with your primary care provider to discuss your overdue screening and schedule an appointment.     If you already have had your screening performed at another health care facility, please ask that practice to send your results to [facility] [phone] and we will update your health records. This will ensure you receive the best possible care from our providers.      If you have any questions or need help with scheduling, please call the [facility] at [phone].        Yours in health,       Your care team at [facility]

## 2023-05-05 NOTE — LETTER
5/5/2023       May 5, 2023  Re: Luan Morgan  YOB: 1980    Dear Colleague,    Thank you for your referral to the [facility] HEART [facility]. We have been unable to reach the patient after multiple contact attempts.      If you have any questions or concerns, please contact our office at Dept: [phone].        Sincerely,  [facility] HEART [facility]

## 2023-05-08 NOTE — LETTER
May 8, 2023  Re: Luan Morgan  YOB: 1980    Dear Colleague,    Thank you for your referral to the [facility] HEPATOLOGY [facility]. We have been unable to reach the patient after multiple contact attempts.      If you have any questions or concerns, please contact our office at Dept: [phone].        Sincerely,  [facility] HEPATOLOGY [facility]

## 2023-05-08 NOTE — LETTER
May 8, 2023       TO: Luan Morgan  [address] [facility] [address]         Dear Luan,     Our records indicate that you have not scheduled an appointment for a Hepatology consult, as recommended by Shahla Joaquin MD. If you wish to schedule within [facility], we have several options to help you schedule your appointment:      Call [phone] Monday-Friday 7 am to 5 pm and we will be happy to assist you.       You can also request an appointment via [employer] if applicable or by visiting [email or web address]     If you have chosen to schedule elsewhere or if you have already made an appointment, please disregard this letter.    If you have any questions or concerns regarding the information above, please contact the Hepatology Clinic at [phone].      Sincerely,      [facility]

## 2023-05-16 ENCOUNTER — TELEPHONE (OUTPATIENT)
Dept: FAMILY MEDICINE | Facility: CLINIC | Age: 43
End: 2023-05-16

## 2023-05-16 DIAGNOSIS — E11.9 TYPE 2 DIABETES MELLITUS WITHOUT COMPLICATION, WITHOUT LONG-TERM CURRENT USE OF INSULIN (H): ICD-10-CM

## 2023-05-16 RX ORDER — LANCETS
EACH MISCELLANEOUS
Qty: 100 EACH | Refills: 11 | Status: SHIPPED | OUTPATIENT
Start: 2023-05-16

## 2023-05-16 RX ORDER — DOXAZOSIN 2 MG/1
2 TABLET ORAL AT BEDTIME
COMMUNITY

## 2023-05-16 NOTE — TELEPHONE ENCOUNTER
Softclix lancets approved per protocol.    Doxazosin routed to PCP for approval--passes protocol but is patient reported/not on current med list.

## 2023-05-18 RX ORDER — DOXAZOSIN 2 MG/1
2 TABLET ORAL AT BEDTIME
Qty: 90 TABLET | Refills: 3 | OUTPATIENT
Start: 2023-05-18

## 2023-05-18 NOTE — TELEPHONE ENCOUNTER
Called patient who states he did not request doxazosin. Both him and his wife are unsure where the request for this medication came from. He is not needing this medication at this time.

## 2023-05-22 NOTE — PROGRESS NOTES
Final attempt to call pt to help schedule weight, heart, and hepatology. Lvm for pt to call back. Sending out letter. Completing task.

## 2023-09-09 ENCOUNTER — HEALTH MAINTENANCE LETTER (OUTPATIENT)
Age: 43
End: 2023-09-09

## 2024-01-02 NOTE — PROGRESS NOTES
"Luan Morgan is a 40 y.o. male who is being evaluated via a billable video visit.      The patient has been notified of following:     \"This video visit will be conducted via a call between you and your physician/provider. We have found that certain health care needs can be provided without the need for an in-person physical exam.  This service lets us provide the care you need with a video conversation.  If a prescription is necessary we can send it directly to your pharmacy.  If lab work is needed we can place an order for that and you can then stop by our lab to have the test done at a later time.    Video visits are billed at different rates depending on your insurance coverage. Please reach out to your insurance provider with any questions.    If during the course of the call the physician/provider feels a video visit is not appropriate, you will not be charged for this service.\"    Patient has given verbal consent to a Video visit? Yes  How would you like to obtain your AVS? AVS Preference: Mail a copy.  If dropped by the video visit, the video invitation should be sent to: Text to cell phone: [phone]  Will anyone else be joining your video visit? Yes: Spouse. How would they like to receive their invitation? Text to cell phone: [phone]        ____________________________    Virtual Visit - Video Encounter  [facility]  Family Medicine  Date of Service: 12/17/2020    Subjective:    Chest Pain  Feels that it is muscle related    Nose spray helped  Chiropractor helped, but discomfort came back.  ENT said reflux  Esophagram showed esophagitis + hernia of bochdalek    Nerve Pain  Needle pain  Prickling discomfort  Arm, chest, leg, back  Onset: 11/28/20 through present (started lansoprazole around the same time)  Number of episodes: comes and goes, worse when sitting  Called nurse line, she suggested that it might be MS    Objective:  There were no vitals taken for this visit. " Medication: rosuvastatin (CRESTOR) 10 MG tablet   Last office visit date: 12.04.23  Next appointment scheduled?: Yes   Number of refills given: 3     GENERAL: sitting comfortably, alert, and in no apparent distress. RESPIRATORY: No retractions, no nasal flaring, overall work of breathing. No audible wheezing, stridor, cough. SKIN: No rashes, bruising or other skin lesions   No visible edema.  No visits with results within 1 Week(s) from this visit.   Latest known visit with results is:   Hospital Outpatient Visit on 10/27/2020   Component Date Value Ref Range Status     Pharmacologic Protocol  10/27/2020 Andrés   Final     Test Type 10/27/2020 Treadmill   Final     Baseline HR 10/27/2020 71   Final     Baseline Systolic BP 10/27/2020 104   Final     Baseline Diastolic BP 10/27/2020 64   Final     Last Stress HR 10/27/2020 169   Final     Last Stress Systolic BP 10/27/2020 198   Final     Last Stress Diastolic BP 10/27/2020 84   Final     Target HR 10/27/2020 183   Final     PERCENT HR 10/27/2020 85%   Final     Exercise duration (min) 10/27/2020 8   Final     Exercise duration (sec) 10/27/2020 5   Final     Estimated workload 10/27/2020 9.7   Final     Exercise Stage Reached  10/27/2020 Stage 3   Final     ST Deviation Elevation 10/27/2020 V2 0.8mm   Final     Deviation Time 10/27/2020 V3 -16.3mm   Final     ST Elevation Amount 10/27/2020 V3 6.4mm   Final     ST Deviation Amount he 10/27/2020 V3 -9.9mm   Final     Final Resting BP 10/27/2020 114/74   Final     Final Resting HR 10/27/2020 111   Final     Max Treadmill Speed 10/27/2020 3.4   Final     Max Treadmill Grade 10/27/2020 14.0   Final     Peak Systolic BP 10/27/2020 198/84   Final     Peak Diastolic BP 10/27/2020 198/84   Final     Max HR 10/27/2020 169   Final     Stress Phase 10/27/2020 Resting   Final     Stress Resting Pt Position 10/27/2020 STANDING   Final     Current HR 10/27/2020 74   Final     Current BP 10/27/2020 104/64   Final     Stress Phase 10/27/2020 Resting   Final     Stress Resting Pt Position 10/27/2020 MANUAL EVENT   Final     Current HR 10/27/2020 159   Final     Current BP  10/27/2020 186/80   Final     Stress Phase 10/27/2020 Stress   Final     Stage Minute 10/27/2020 EXE 00:00   Final     Exercise Stage 10/27/2020 STAGE 1   Final     Current HR 10/27/2020 81   Final     Current BP 10/27/2020 112/66   Final     Stress Phase 10/27/2020 Stress   Final     Stage Minute 10/27/2020 EXE 01:00   Final     Exercise Stage 10/27/2020 STAGE 1   Final     Current HR 10/27/2020 98   Final     Current BP 10/27/2020 112/66   Final     Stress Phase 10/27/2020 Stress   Final     Stage Minute 10/27/2020 EXE 02:00   Final     Exercise Stage 10/27/2020 STAGE 1   Final     Current HR 10/27/2020 101   Final     Current BP 10/27/2020 112/66   Final     Stress Phase 10/27/2020 Stress   Final     Stage Minute 10/27/2020 EXE 02:37   Final     Exercise Stage 10/27/2020 STAGE 1   Final     Current HR 10/27/2020 109   Final     Current BP 10/27/2020 172/70   Final     Stress Phase 10/27/2020 Stress   Final     Stage Minute 10/27/2020 EXE 03:00   Final     Exercise Stage 10/27/2020 STAGE 2   Final     Current HR 10/27/2020 107   Final     Current BP 10/27/2020 172/70   Final     Stress Phase 10/27/2020 Stress   Final     Stage Minute 10/27/2020 EXE 04:00   Final     Exercise Stage 10/27/2020 STAGE 2   Final     Current HR 10/27/2020 118   Final     Current BP 10/27/2020 172/70   Final     Stress Phase 10/27/2020 Stress   Final     Stage Minute 10/27/2020 EXE 05:00   Final     Exercise Stage 10/27/2020 STAGE 2   Final     Current HR 10/27/2020 125   Final     Current BP 10/27/2020 172/70   Final     Stress Phase 10/27/2020 Stress   Final     Stage Minute 10/27/2020 EXE 05:12   Final     Exercise Stage 10/27/2020 STAGE 2   Final     Current HR 10/27/2020 129   Final     Current BP 10/27/2020 186/80   Final     Stress Phase 10/27/2020 Stress   Final     Stage Minute 10/27/2020 EXE 06:00   Final     Exercise Stage 10/27/2020 STAGE 3   Final     Current HR 10/27/2020 134   Final     Current BP 10/27/2020 186/80   Final      Stress Phase 10/27/2020 Stress   Final     Stage Minute 10/27/2020 EXE 07:00   Final     Exercise Stage 10/27/2020 STAGE 3   Final     Current HR 10/27/2020 158   Final     Current BP 10/27/2020 186/80   Final     Stress Phase 10/27/2020 Stress   Final     Stage Minute 10/27/2020 EXE 07:01   Final     Exercise Stage 10/27/2020 STAGE 3   Final     Current HR 10/27/2020 159   Final     Current BP 10/27/2020 186/80   Final     Stress Phase 10/27/2020 Stress   Final     Stage Minute 10/27/2020 EXE 07:44   Final     Exercise Stage 10/27/2020 STAGE 3   Final     Current HR 10/27/2020 164   Final     Current BP 10/27/2020 198/84   Final     Stress Phase 10/27/2020 Stress   Final     Stage Minute 10/27/2020 EXE 08:00   Final     Exercise Stage 10/27/2020 STAGE 3   Final     Current HR 10/27/2020 167   Final     Current BP 10/27/2020 198/84   Final     Stress Phase 10/27/2020 Stress   Final     Stage Minute 10/27/2020 EXE 08:05   Final     Exercise Stage 10/27/2020 STAGE 3   Final     Current HR 10/27/2020 169   Final     Current BP 10/27/2020 198/84   Final     Stress Phase 10/27/2020 Recovery   Final     Stage Minute 10/27/2020 REC 00:43   Final     Exercise Stage 10/27/2020 Recovery   Final     Current HR 10/27/2020 157   Final     Current BP 10/27/2020 194/70   Final     Stress Phase 10/27/2020 Recovery   Final     Stage Minute 10/27/2020 REC 00:48   Final     Exercise Stage 10/27/2020 Recovery   Final     Current HR 10/27/2020 155   Final     Current BP 10/27/2020 194/70   Final     Stress Phase 10/27/2020 Recovery   Final     Stage Minute 10/27/2020 REC 00:55   Final     Exercise Stage 10/27/2020 Recovery   Final     Current HR 10/27/2020 153   Final     Current BP 10/27/2020 194/70   Final     Stress Phase 10/27/2020 Recovery   Final     Stage Minute 10/27/2020 REC 01:55   Final     Exercise Stage 10/27/2020 Recovery   Final     Current HR 10/27/2020 134   Final     Current BP 10/27/2020 194/70   Final     Stress  Phase 10/27/2020 Recovery   Final     Stage Minute 10/27/2020 REC 02:02   Final     Exercise Stage 10/27/2020 Recovery   Final     Current HR 10/27/2020 134   Final     Current BP 10/27/2020 152/64   Final     Stress Phase 10/27/2020 Recovery   Final     Stage Minute 10/27/2020 REC 02:55   Final     Exercise Stage 10/27/2020 Recovery   Final     Current HR 10/27/2020 126   Final     Current BP 10/27/2020 152/64   Final     Stress Phase 10/27/2020 Recovery   Final     Stage Minute 10/27/2020 REC 03:48   Final     Exercise Stage 10/27/2020 Recovery   Final     Current HR 10/27/2020 114   Final     Current BP 10/27/2020 134/70   Final     Stress Phase 10/27/2020 Recovery   Final     Stage Minute 10/27/2020 REC 03:55   Final     Exercise Stage 10/27/2020 Recovery   Final     Current HR 10/27/2020 113   Final     Current BP 10/27/2020 134/70   Final     Stress Phase 10/27/2020 Recovery   Final     Stage Minute 10/27/2020 REC 04:55   Final     Exercise Stage 10/27/2020 Recovery   Final     Current HR 10/27/2020 110   Final     Current BP 10/27/2020 134/70   Final     Stress Phase 10/27/2020 Recovery   Final     Stage Minute 10/27/2020 REC 05:35   Final     Exercise Stage 10/27/2020 Recovery   Final     Current HR 10/27/2020 110   Final     Current BP 10/27/2020 114/74   Final     Stress Phase 10/27/2020 Recovery   Final     Stage Minute 10/27/2020 REC 05:55   Final     Exercise Stage 10/27/2020 Recovery   Final     Current HR 10/27/2020 108   Final     Current BP 10/27/2020 114/74   Final     Stress Phase 10/27/2020 Recovery   Final     Stage Minute 10/27/2020 REC 06:23   Final     Exercise Stage 10/27/2020 Recovery   Final     Current HR 10/27/2020 111   Final     Current BP 10/27/2020 114/74   Final     Calculated Percent HR 10/27/2020 92  % Final     Rate Pressure Product 10/27/2020 33,462.0   Final     Left Ventricular EF 10/27/2020 68  % Final     Us Abdomen Complete    Result Date: 11/21/2020  EXAM: US ABDOMEN  COMPLETE LOCATION: [facility] DATE/TIME: 11/21/2020 8:52 AM INDICATION: abd pain COMPARISON: None. TECHNIQUE: Complete abdominal ultrasound. FINDINGS: GALLBLADDER: Normal. No gallstones, wall thickening, or pericholecystic fluid. Negative sonographic Tran's sign. BILE DUCTS: No biliary dilatation. The common duct measures 3 mm. LIVER: Increased echogenicity from diffuse fatty infiltration. No focal mass. RIGHT KIDNEY: Normal size. Normal echogenicity with no hydronephrosis or mass. LEFT KIDNEY: Normal size. Normal echogenicity with no hydronephrosis or mass. SPLEEN: Normal. PANCREAS: The visualized portions are normal. AORTA: Normal in caliber. IVC: Normal where visualized. No ascites.     Hepatic steatosis.    Us Thyroid    Result Date: 11/21/2020  EXAM: US THYROID LOCATION: [facility] DATE/TIME: 11/21/2020 8:56 AM INDICATION: Neck pain. COMPARISON: None. TECHNIQUE: Thyroid ultrasound. FINDINGS: RIGHT lobe: 4.7 x 1.5 x 1.3 cm. Homogeneous echotexture. Isthmus: 2 mm. LEFT lobe: 4.0 x 1.3 x 1.2 cm. Homogeneous echotexture. NECK: Mildly prominent architecturally normal right submandibular lymph node measures 2.5 x 1.6 x 0.6 cm. No additional cervical lymphadenopathy.     1.  Normal thyroid ultrasound. 2.  Mildly prominent architecturally normal right submandibular lymph node may be reactive. This could be followed up with ultrasound. Nodules are characterized per ACR Thyroid Imaging, Reporting and Data System (TI-RADS): White Paper of the ACR TI-RADS Committee Jeffrey Miles et al. Journal of the American College of Radiology 2017. Volume 14 (2017), Issue 5, 062-249.     Xr Esophagram    Result Date: 12/8/2020  EXAM: XR ESOPHAGRAM LOCATION: [facility] DATE/TIME: 12/8/2020 8:23 AM INDICATION: gerd COMPARISON: CT abdomen pelvis dated 10/07/2020. TECHNIQUE: Routine. FINDINGS: FLUOROSCOPIC TIME: 1.3 minutes NUMBER OF IMAGES: No spot images.  8 saved fluoroscopic loops. ESOPHAGUS: There is mild irregularity of the gastroesophageal junction consistent with mild esophagitis. No fixed strictures or masses are identified. Esophageal motility is normal. No gastroesophageal reflux is limited however there is an apparent small  paraesophageal hernia with a small portion of the gastric cardia herniating through a diaphragmatic defect consistent with a small Bochdalek hernia.     1.  Mild irregularity of the gastroesophageal junction suggesting esophagitis. No fixed stricture or mass. 2.  Left posterior diaphragmatic defect with herniation of a small portion of the gastric cardia through the diaphragmatic defect consistent with a Bochdalek hernia containing stomach.     Assessment & Plan:  1. Tingling pain on torso and extremities - onset concurrent with switching from omeprazole to lansoprazole and starting azelastine nasal spray. Off azelastine now. Switch back to omeprazole, increase dose to 40 mg every morning. Check labs, as below.  2. GERD with esophagitis based on laryngoscopy + esophagram. At risk due to diaphragmatic hernia. Continue lifestyle modification and weight loss (has been doing well). Increase ppi. If symptoms persist, he will let us know so that we can intensify therapy.      Order Summary                                                      1. Other chest pain     2. Gastroesophageal reflux disease without esophagitis  omeprazole (PRILOSEC) 40 MG capsule   3. Tingling pain  Comprehensive Metabolic Panel    Magnesium    Thyroid Cascade    Vitamin D, Total (25-Hydroxy)   4. Gastroesophageal reflux disease with esophagitis without hemorrhage        Future Appointments   Date Time Provider Department Center   1/25/2021  8:00 AM Jericho Manjarrez MD [facility]       Completed by: Shahla Joaquin M.D., [facility]. 12/17/2020 9:55 AM.  This transcription uses voice recognition software, which may contain typographical  errors.  ____________________________  Start visit: 9:55 AM  End visit: 10:11 AM     Video-Visit Details    Type of service:  Video Visit    Video End Time (time video stopped): 10:11 AM  Originating Location (pt. Location): Home    Distant Location (provider location):  [facility]     Platform used for Video Visit: Yaquelin Joaquin MD

## 2024-01-04 ENCOUNTER — HOSPITAL ENCOUNTER (EMERGENCY)
Facility: HOSPITAL | Age: 44
Discharge: HOME | End: 2024-01-04
Attending: EMERGENCY MEDICINE | Admitting: EMERGENCY MEDICINE
Payer: COMMERCIAL

## 2024-01-04 ENCOUNTER — NURSE TRIAGE (OUTPATIENT)
Dept: NURSING | Facility: CLINIC | Age: 44
End: 2024-01-04

## 2024-01-04 VITALS
SYSTOLIC BLOOD PRESSURE: 132 MMHG | RESPIRATION RATE: 16 BRPM | TEMPERATURE: 98.2 F | DIASTOLIC BLOOD PRESSURE: 81 MMHG | BODY MASS INDEX: 40.03 KG/M2 | OXYGEN SATURATION: 97 % | WEIGHT: 226 LBS | HEART RATE: 85 BPM

## 2024-01-04 VITALS
RESPIRATION RATE: 18 BRPM | DIASTOLIC BLOOD PRESSURE: 99 MMHG | SYSTOLIC BLOOD PRESSURE: 160 MMHG | TEMPERATURE: 97.3 F | BODY MASS INDEX: 40.13 KG/M2 | OXYGEN SATURATION: 94 % | WEIGHT: 226.5 LBS | HEIGHT: 63 IN | HEART RATE: 93 BPM

## 2024-01-04 DIAGNOSIS — L03.221 CELLULITIS, NECK: ICD-10-CM

## 2024-01-04 DIAGNOSIS — T78.40XA ALLERGIC REACTION, INITIAL ENCOUNTER: ICD-10-CM

## 2024-01-04 LAB
ALBUMIN SERPL BCG-MCNC: 5.1 G/DL (ref 3.5–5.2)
ALP SERPL-CCNC: 91 U/L (ref 40–150)
ALT SERPL W P-5'-P-CCNC: 98 U/L (ref 0–70)
ANION GAP SERPL CALCULATED.3IONS-SCNC: 10 MMOL/L (ref 7–15)
AST SERPL W P-5'-P-CCNC: 53 U/L (ref 0–45)
BASOPHILS # BLD AUTO: 0 10E3/UL (ref 0–0.2)
BASOPHILS NFR BLD AUTO: 0 %
BILIRUB SERPL-MCNC: 0.8 MG/DL
BUN SERPL-MCNC: 12 MG/DL (ref 6–20)
CALCIUM SERPL-MCNC: 9.6 MG/DL (ref 8.6–10)
CHLORIDE SERPL-SCNC: 102 MMOL/L (ref 98–107)
CREAT SERPL-MCNC: 0.77 MG/DL (ref 0.67–1.17)
DEPRECATED HCO3 PLAS-SCNC: 24 MMOL/L (ref 22–29)
EGFRCR SERPLBLD CKD-EPI 2021: >90 ML/MIN/1.73M2
EOSINOPHIL # BLD AUTO: 0.1 10E3/UL (ref 0–0.7)
EOSINOPHIL NFR BLD AUTO: 1 %
ERYTHROCYTE [DISTWIDTH] IN BLOOD BY AUTOMATED COUNT: 13 % (ref 10–15)
GLUCOSE SERPL-MCNC: 120 MG/DL (ref 70–99)
HCT VFR BLD AUTO: 48.9 % (ref 40–53)
HGB BLD-MCNC: 16.5 G/DL (ref 13.3–17.7)
IMM GRANULOCYTES # BLD: 0 10E3/UL
IMM GRANULOCYTES NFR BLD: 0 %
LYMPHOCYTES # BLD AUTO: 3.3 10E3/UL (ref 0.8–5.3)
LYMPHOCYTES NFR BLD AUTO: 32 %
MCH RBC QN AUTO: 28 PG (ref 26.5–33)
MCHC RBC AUTO-ENTMCNC: 33.7 G/DL (ref 31.5–36.5)
MCV RBC AUTO: 83 FL (ref 78–100)
MONOCYTES # BLD AUTO: 0.4 10E3/UL (ref 0–1.3)
MONOCYTES NFR BLD AUTO: 4 %
NEUTROPHILS # BLD AUTO: 6.5 10E3/UL (ref 1.6–8.3)
NEUTROPHILS NFR BLD AUTO: 63 %
NRBC # BLD AUTO: 0 10E3/UL
NRBC BLD AUTO-RTO: 0 /100
PLATELET # BLD AUTO: 279 10E3/UL (ref 150–450)
POTASSIUM SERPL-SCNC: 3.8 MMOL/L (ref 3.4–5.3)
PROT SERPL-MCNC: 8.1 G/DL (ref 6.4–8.3)
RBC # BLD AUTO: 5.9 10E6/UL (ref 4.4–5.9)
SODIUM SERPL-SCNC: 136 MMOL/L (ref 135–145)
WBC # BLD AUTO: 10.4 10E3/UL (ref 4–11)

## 2024-01-04 PROCEDURE — 96374 THER/PROPH/DIAG INJ IV PUSH: CPT

## 2024-01-04 PROCEDURE — 250N000013 HC RX MED GY IP 250 OP 250 PS 637: Performed by: EMERGENCY MEDICINE

## 2024-01-04 PROCEDURE — 80053 COMPREHEN METABOLIC PANEL: CPT | Performed by: PHYSICIAN ASSISTANT

## 2024-01-04 PROCEDURE — 85025 COMPLETE CBC W/AUTO DIFF WBC: CPT | Performed by: PHYSICIAN ASSISTANT

## 2024-01-04 PROCEDURE — 99283 EMERGENCY DEPT VISIT LOW MDM: CPT

## 2024-01-04 PROCEDURE — 96375 TX/PRO/DX INJ NEW DRUG ADDON: CPT

## 2024-01-04 PROCEDURE — 99284 EMERGENCY DEPT VISIT MOD MDM: CPT | Mod: 25

## 2024-01-04 PROCEDURE — 250N000011 HC RX IP 250 OP 636: Performed by: PHYSICIAN ASSISTANT

## 2024-01-04 PROCEDURE — 36415 COLL VENOUS BLD VENIPUNCTURE: CPT | Performed by: PHYSICIAN ASSISTANT

## 2024-01-04 RX ORDER — CLINDAMYCIN HCL 150 MG
300 CAPSULE ORAL ONCE
Status: COMPLETED | OUTPATIENT
Start: 2024-01-04 | End: 2024-01-04

## 2024-01-04 RX ORDER — PREDNISONE 20 MG/1
TABLET ORAL
Qty: 8 TABLET | Refills: 0 | Status: SHIPPED | OUTPATIENT
Start: 2024-01-04 | End: 2024-10-07

## 2024-01-04 RX ORDER — CLINDAMYCIN HCL 300 MG
300 CAPSULE ORAL 3 TIMES DAILY
Qty: 21 CAPSULE | Refills: 0 | Status: SHIPPED | OUTPATIENT
Start: 2024-01-04 | End: 2024-01-11

## 2024-01-04 RX ORDER — DIPHENHYDRAMINE HCL 25 MG
25 CAPSULE ORAL ONCE
Status: COMPLETED | OUTPATIENT
Start: 2024-01-04 | End: 2024-01-04

## 2024-01-04 RX ORDER — DIPHENHYDRAMINE HYDROCHLORIDE 50 MG/ML
25 INJECTION INTRAMUSCULAR; INTRAVENOUS ONCE
Status: COMPLETED | OUTPATIENT
Start: 2024-01-04 | End: 2024-01-04

## 2024-01-04 RX ORDER — METHYLPREDNISOLONE SODIUM SUCCINATE 125 MG/2ML
125 INJECTION, POWDER, LYOPHILIZED, FOR SOLUTION INTRAMUSCULAR; INTRAVENOUS ONCE
Status: COMPLETED | OUTPATIENT
Start: 2024-01-04 | End: 2024-01-04

## 2024-01-04 RX ADMIN — CLINDAMYCIN HYDROCHLORIDE 300 MG: 150 CAPSULE ORAL at 07:59

## 2024-01-04 RX ADMIN — DIPHENHYDRAMINE HYDROCHLORIDE 25 MG: 25 CAPSULE ORAL at 07:59

## 2024-01-04 RX ADMIN — METHYLPREDNISOLONE SODIUM SUCCINATE 125 MG: 125 INJECTION, POWDER, FOR SOLUTION INTRAMUSCULAR; INTRAVENOUS at 16:04

## 2024-01-04 RX ADMIN — FAMOTIDINE 20 MG: 10 INJECTION, SOLUTION INTRAVENOUS at 16:04

## 2024-01-04 RX ADMIN — DIPHENHYDRAMINE HYDROCHLORIDE 25 MG: 50 INJECTION, SOLUTION INTRAMUSCULAR; INTRAVENOUS at 16:03

## 2024-01-04 ASSESSMENT — ENCOUNTER SYMPTOMS
ROS SKIN COMMENTS: POSITIVE FOR ITCHING.
CHILLS: 0
FEVER: 0

## 2024-01-04 ASSESSMENT — ACTIVITIES OF DAILY LIVING (ADL): ADLS_ACUITY_SCORE: 35

## 2024-01-04 NOTE — ED TRIAGE NOTES
"Patient was bitten by an insect yesterday. Patient unsure of what insect bit him. He was bit behind both ears and on both elbows. Patient put \"after bite\" on areas and since then areas have swollen, became reddened and hot to the touch. Patient also has itchiness of bilateral hands.     Triage Assessment (Adult)       Row Name 01/04/24 0707          Triage Assessment    Airway WDL WDL        Respiratory WDL    Respiratory WDL WDL        Skin Circulation/Temperature WDL    Skin Circulation/Temperature WDL WDL        Cardiac WDL    Cardiac WDL WDL        Peripheral/Neurovascular WDL    Peripheral Neurovascular WDL WDL        Cognitive/Neuro/Behavioral WDL    Cognitive/Neuro/Behavioral WDL WDL                     "

## 2024-01-04 NOTE — ED PROVIDER NOTES
EMERGENCY DEPARTMENT ENCOUNTER   NAME: Luan Morgan ; AGE: 43 year old male ; YOB: 1980 ; MRN: [id number] ; PCP: Shahla Joaquin     Evaluation Date & Time: 1/4/2024  3:27 PM    ED Provider: Sabrina Gutierrez PA-C    CHIEF COMPLAINT     Skin redness to bilateral hands      FINAL ASSESSMENT       ICD-10-CM    1. Allergic reaction, initial encounter  T78.40XA           ED COURSE, MEDICAL DECISION MAKING, PLAN     ED course   3:20 PM: Evaluated patient in an overflow triage room.  Performed physical exam.  Plan for Solu-Medrol, Benadryl, and Pepcid for presumed allergic reaction.  Will monitor here for some time.  4:51 PM: Re-checked patient. He has had improvement in the hand swelling and areas behind his ears. Still no difficulty with breathing, swallowing, nausea, vomiting. Plan for discharge.    _____________________________________________________________________    Luan is a 43-year-old male presenting with increasing hand swelling and redness since this morning.  Patient was seen here at [facility] this morning for an itchy and painful rash behind both of his ears and on his elbows.  He did note a small amount of swelling on his hands at that time, but nothing substantial.  He was discharged with a course of clindamycin to cover for possible cellulitis behind the left ear.  Comes back now because the rash on the ears and the elbows has decreased, but his hands have now become much more swollen and red.    On exam patient does have thick and blotchy erythematous tissue behind both of his ears that extends down the neck slightly.  He has a couple of hive-like lesions on the upper arms.  Both of his hands are swollen and there is some erythema to the palmar surfaces.  No oropharynx swelling.  No difficulty breathing or wheezing.  Patient is initially hypertensive at 167/102, but otherwise vitally normal.  This did normalize by the end of the visit.    Concern for allergic reaction, cellulitis,  inflammatory arthritis.    CBC and CMP completed.  Very minimal elevations of the AST and ALT otherwise unremarkable.  He has had these elevations in the past and has been worked up.    Overall, presentation seems to be most consistent with some sort of allergic reaction to an unknown trigger.  No red flag signs or symptoms present to suggest anaphylaxis or other life-threatening etiology.  He has no difficulty breathing.  Clear lung sounds.  Oxygenating well.  No nausea or vomiting.  No oropharynx swelling.    Patient was given 125 mg of IV Solu-Medrol, 25 mg of IV Benadryl, 20 mg of IV Pepcid.  After about an hour or so patient did report fairly good improvement in the swelling in his hands.  On reassessment, the rashes behind his ears are actually almost all gone and his hands do look quite a bit less swollen and less erythematous.  I think this helps confirm that this seems to be some sort of allergic reaction.    Really do not think there is any cellulitis going on at this point.  I informed patient he could discontinue the clindamycin at this point.  A cellulitis really would not improve this rapidly with IV medications.    Patient will discharge home with a 4-day burst of prednisone that will be started tomorrow.  He will also continue with Pepcid twice daily and Benadryl as needed over the next couple of days.    Patient instructed to monitor his blood sugars very closely as steroids will increase them.  He understands that if the blood sugars are getting severely elevated he should discontinue the prednisone.    Also encourage patient to discontinue the bite ointment that he used in the event this had some role in worsening symptoms.    Indications for reevaluation discussed with patient.      *All pertinent lab & imaging studies independently reviewed. (See chart for details)   *Discussed the results of all the tests and plan with patient and family/guardians.   *All questions were answered.   *The patient  and/or family/guardian acknowledged understanding and was agreeable with the care plan.      HISTORY OF PRESENT ILLNESS   Patient information was obtained from: Patient    Use of Intrepreter: N/A     Luan Morgan is a 43 year old male with a pertinent history of DM2, HLD, GERD who presents to the ED by means of walk in with wife for re-check on a rash behind his ears and swelling to his hands.     Patient was seen here this morning in the ER and discharged.    Per my chart review, patient was seen here around 8 AM for concerns about an insect bite behind both of his ears and both elbows.  He was found to have erythema, swelling, and tenderness behind the left ear without fluctuance or crepitus.  He was also found to have some small erythematous papules on the extensor surfaces of the elbows.  Was ultimately diagnosed with cellulitis by the left ear and prescribed clindamycin.    Patient states that after he got home, he started to notice his hands getting more and more swollen and red.  He notes that the areas behind his ears and elbows have actually improved some.    He denies having any difficulty breathing or swallowing.  No chest pain.  No nausea or vomiting.  No paresthesias.    Patient is not certain what could be causing symptoms.  He initially thought may be insect bites, but he is not certain.  He cannot recall any new foods, medications, hygiene products.    He does note that after he noticed his ears bothering him, he used an over-the-counter bite ointment and felt like symptoms worsened substantially after that.  He is wondering if he could be reacting to that.    No other concerns.      MEDICAL HISTORY     Past Medical History:   Diagnosis Date    Sleep apnea     Tenesmus 10/08/2020       Past Surgical History:   Procedure Laterality Date    NO PAST SURGERIES         Family History   Problem Relation Age of Onset    Diabetes Type 2  Mother     Lung Cancer Mother     Ovarian Cancer Mother     Breast  Cancer Mother     Diabetes Type 2  Father     Heart Disease Father Brett Roland does not know what kind of heart disease    Kidney Disease Father     Diabetes Type 2  Sister     Diabetes Type 2  Sister     No Known Problems Brother     Sudden Death Paternal Uncle        Social History     Tobacco Use    Smoking status: Former     Packs/day: 3.50     Years: 15.00     Additional pack years: 0.00     Total pack years: 52.50     Types: Cigarettes     Quit date: 4/1/2013     Years since quitting: 10.7     Passive exposure: Past    Smokeless tobacco: Never   Substance Use Topics    Alcohol use: Never    Drug use: Never       predniSONE (DELTASONE) 20 MG tablet  ACCU-CHEK GUIDE TEST STRIPS strips  alcohol swabs PadM  azelastine (ASTELIN) 137 mcg (0.1 %) nasal spray  blood glucose monitoring (SOFTCLIX) lancets  clindamycin (CLEOCIN) 300 MG capsule  doxazosin (CARDURA) 2 MG tablet  rosuvastatin (CRESTOR) 40 MG tablet          PHYSICAL EXAM     First Vitals:  Patient Vitals for the past 24 hrs:   BP Temp Temp src Pulse Resp SpO2 Weight   01/04/24 1700 132/81 -- -- 85 16 97 % --   01/04/24 1231 (!) 167/102 98.2  F (36.8  C) Oral 98 16 97 % 102.5 kg (226 lb)         PHYSICAL EXAM:   Constitutional: No acute distress.  Neuro: Awake and alert.  Psych: Calm and cooperative.  Head: No swelling noted to the face.  Eyes: PERRL. EOMI. Conjunctivae clear. No crusting or mattering of the lids or lashes. No periocular redness or swelling.   Ears: Red thick blotchy rash behind each ear that extends slightly down onto the neck.  Not really painful.  Maybe some extension onto the pinnae.     Mouth: No oropharynx swelling or erythema.    Neck: FROM.  No edema.  Cardio: Regular rate. Adequate perfusion to extremities. Regular rhythm. No murmurs.  Pulmonary: Oxygenating well on RA. No labored breathing. CTA b/l.  Upper extremities: Bilateral hands are minimally swollen and slightly erythematous on the palmar surfaces.  Moves freely. Distal  pulses intact. Sensations intact.   Lower extremities: Moves freely. No edema.   Skin: See above.  He also has some hive-like lesions to the upper arms and shoulder region.  All other skin appears natural color. Warm, dry, intact.       RESULTS     LAB:  All pertinent labs reviewed and interpreted  Labs Ordered and Resulted from Time of ED Arrival to Time of ED Departure   COMPREHENSIVE METABOLIC PANEL - Abnormal       Result Value    Sodium 136      Potassium 3.8      Carbon Dioxide (CO2) 24      Anion Gap 10      Urea Nitrogen 12.0      Creatinine 0.77      GFR Estimate >90      Calcium 9.6      Chloride 102      Glucose 120 (*)     Alkaline Phosphatase 91      AST 53 (*)     ALT 98 (*)     Protein Total 8.1      Albumin 5.1      Bilirubin Total 0.8     CBC WITH PLATELETS AND DIFFERENTIAL    WBC Count 10.4      RBC Count 5.90      Hemoglobin 16.5      Hematocrit 48.9      MCV 83      MCH 28.0      MCHC 33.7      RDW 13.0      Platelet Count 279      % Neutrophils 63      % Lymphocytes 32      % Monocytes 4      % Eosinophils 1      % Basophils 0      % Immature Granulocytes 0      NRBCs per 100 WBC 0      Absolute Neutrophils 6.5      Absolute Lymphocytes 3.3      Absolute Monocytes 0.4      Absolute Eosinophils 0.1      Absolute Basophils 0.0      Absolute Immature Granulocytes 0.0      Absolute NRBCs 0.0         RADIOLOGY:  No orders to display       ECG:  N/A      PROCEDURES     None           History:  Supplemental history from: Family Member/Significant Other  External Record(s) reviewed: Outpatient Record: See HPI    Work Up:  Chart documentation includes differentials considered and any EKGs or imaging independently interpreted by provider, where specified.  In additional to work up documented, I considered the following work up: Documented in chart, if applicable.    External consultation:  Discussion of management with another provider: Documented in chart, if applicable    Complicating factors:  Care  impacted by chronic illness: Diabetes  Care affected by social determinants of health: N/A    Disposition considerations: Discharge. I prescribed additional prescription strength medication(s) as charted. See documentation for any additional details.    FINAL IMPRESSION:    ICD-10-CM    1. Allergic reaction, initial encounter  T78.40XA             MEDICATIONS GIVEN IN THE EMERGENCY DEPARTMENT:  Medications   methylPREDNISolone sodium succinate (solu-MEDROL) injection 125 mg (125 mg Intravenous $Given 1/4/24 1604)   diphenhydrAMINE (BENADRYL) injection 25 mg (25 mg Intravenous $Given 1/4/24 1603)   famotidine (PEPCID) injection 20 mg (20 mg Intravenous $Given 1/4/24 1604)         NEW PRESCRIPTIONS STARTED AT TODAY'S ED VISIT:  Discharge Medication List as of 1/4/2024  4:59 PM        START taking these medications    Details   predniSONE (DELTASONE) 20 MG tablet Take two tablets (= 40mg) each day for 4 (four) days, Disp-8 tablet, R-0, Local Print                  Some or all of this documentation has been completed using dictation software and mild grammatical errors may be present. Please contact me with any concerns regarding this.       Sabrina Gutierrez PA-C  Emergency Medicine   [facility] EMERGENCY DEPARTMENT       Sabrina Gutierrez PA-C  01/04/24 [id number]

## 2024-01-04 NOTE — TELEPHONE ENCOUNTER
His wife is doing the talking. He is answering the questions. She then gave him the phone. Throat is swelling, swelling  behind ear as well. Had a bug bite. Woke yesterday with bug bites behind both ears. Now it's swollen, hard, pain in his throat. He can swallow saliva, talk and eat.  Tips of fingers, feels like frost bite near the webbing. At first it was two fingers, now it's into the middle finger, both hands. Bites also on elbow found, redness in the area. He will get to the ER now for evaluation.  Viki Valentin RN  [facility]    Reason for Disposition   [1] Red or very tender (to touch) area AND [2] started over 24 hours after the bite    Additional Information   Negative: [1] Life-threatening reaction (anaphylaxis) in the past to bite from same insect AND [2] < 2 hours since bite   Negative: Passed out (i.e., lost consciousness, collapsed and was not responding)   Negative: Difficulty breathing or wheezing   Negative: [1] Hoarseness or cough AND [2] sudden onset following bite   Negative: [1] Difficulty swallowing or slurred speech AND [2] sudden onset following bite   Negative: Sounds like a life-threatening emergency to the triager   Negative: Bee sting(s)   Negative: Fire ant sting   Negative: Spider bite(s)   Negative: Tick bite(s)   Negative: Mosquito bite(s)   Negative: Bed bug bite(s)   Negative: Boil suspected (i.e., painful red lump and NO insect bite)   Negative: Doesn't sound like an insect bite   Negative: Patient sounds very sick or weak to the triager   Negative: [1] SEVERE bite pain AND [2] not improved after 2 hours of pain medicine   Negative: [1] Fever AND [2] red area   Negative: [1] Fever AND [2] area is very tender to touch   Negative: [1] Red streak or red line AND [2] length > 2 inches (5 cm)     Tips of finger, feels like frost bite near the webbing. At first it was two fingers now it's into the middle finger, both hands.    Protocols used: Insect Bite-A-

## 2024-01-04 NOTE — ED NOTES
Discharge paperwork, prescription and follow up care discussed with pt. Pt verbalized understanding and has no questions at time of discharge.

## 2024-01-04 NOTE — DISCHARGE INSTRUCTIONS
I suspect you are dealing with an allergic reaction to an unknown trigger.    Your symptoms have greatly improved with her medications here, I have a much lower suspicion that you have an active infection.  You can discontinue the clindamycin.    I am going to send you home with a prescription for a steroid.  Start taking this tomorrow and take for 4 days.     Continue using Benadryl 5 mg every 4-6 hours and take Pepcid 20 mg twice a day.    This should clear up in the next couple of days.    If symptoms return or worsen do not hesitate to seek reevaluation.

## 2024-01-04 NOTE — ED PROVIDER NOTES
EMERGENCY DEPARTMENT ENCOUNTER      NAME: Luan Morgan  AGE: 43 year old male  YOB: 1980  MRN: [id number]  EVALUATION DATE & TIME: 2024  7:18 AM    PCP: Shahla Joaquin    ED PROVIDER: Flores Horton DO      Chief Complaint   Patient presents with    Insect Bite         FINAL IMPRESSION:  1. Cellulitis, neck          ED COURSE & MEDICAL DECISION MAKIN-year-old male presented to the ED for evaluation of an insect bite located behind both the ears and both elbows.  The patient also reported discomfort located behind the left ear and the left side of his neck.  Here in the ED the patient was hypertensive upon arrival.  He was otherwise hemodynamically stable.  The patient did not appear to be in any obvious distress or discomfort and he was not acutely ill-appearing at the time of his initial evaluation.  On exam the patient was noted to have a small area of mild erythema, swelling, and tenderness palpation noted behind the left ear.  There were no clear fluctuant areas or crepitus noted.  The patient also had a few small erythematous papules noted to the extensor surfaces of the bilateral elbows.  The remainder of his physical exam was unremarkable.      Following his initial evaluation the patient was informed that his symptoms behind his left ear may be due to a developing cellulitis.  The skin findings do not appear consistent with an allergic reaction at this time.  The patient was given a dose of clindamycin to treat the infection and Benadryl to treat the itching.  Following this the patient felt comfortable returning home.  The patient was sent home with clindamycin to treat the suspected cellulitis.  He was instructed to use his home Benadryl and ibuprofen as needed.  The patient was instructed to follow-up with his primary care provider for reevaluation or to return back to ED sooner for any worsening redness, swelling, warmth, or pain behind the left ear, especially after he has been on  antibiotics for at least 48 hours or for any fevers, difficulty swallowing or breathing, or any other new or concerning symptoms.    Pertinent Labs & Imaging studies reviewed. (See chart for details)  7:29 AM I met with the patient to gather history and to perform my initial exam. We discussed plans for the ED course, including diagnostic testing and treatment.       At the conclusion of the encounter I discussed the results of all of the tests and the disposition. The questions were answered. The patient or family acknowledged understanding and was agreeable with the care plan.     Medical Decision Making    History:  Supplemental history from: Family Member/Significant Other  External Record(s) reviewed: Documented in chart    Work Up:  Chart documentation includes differential considered and any EKGs or imaging independently interpreted by provider, where specified.  In additional to work up documented, I considered the following work up: Documented in chart, if applicable.    External consultation:  Discussion of management with another provider: Documented in chart, if applicable    Complicating factors:  Care impacted by chronic illness: Diabetes and Hyperlipidemia  Care affected by social determinants of health: N/A    Disposition considerations: Discharge. I prescribed additional prescription strength medication(s) as charted. See documentation for any additional details.      PPE worn: n95 mask, goggles    MEDICATIONS GIVEN IN THE EMERGENCY:  Medications   clindamycin (CLEOCIN) capsule 300 mg (300 mg Oral $Given 1/4/24 0759)   diphenhydrAMINE (BENADRYL) capsule 25 mg (25 mg Oral $Given 1/4/24 0759)       NEW PRESCRIPTIONS STARTED AT TODAY'S ER VISIT  New Prescriptions    CLINDAMYCIN (CLEOCIN) 300 MG CAPSULE    Take 1 capsule (300 mg) by mouth 3 times daily for 7 days          =================================================================    HPI    Patient information was obtained from: Patient and  patient's spouse    Use of : N/A         Luan TAYLOR Morgan is a 43 year old male with a pertinent history of T2DM and HLD who presents to this ED by private vehicle for evaluation of an insect bite.    The patient was bitten by an insect yesterday and initially noticed bumps on his elbows and behind his ears that were itchy. He bought and applied a topical medication onto his elbows and ears. After applying this medication, he began to endorse pain on his elbows and behind his ears, worse on the area of his left ear and left neck. Patient took benadryl which did improve the itching. He otherwise denies fever, chills, respiratory difficulties, throat swelling or any other complaints at this time.       REVIEW OF SYSTEMS   Review of Systems   Constitutional:  Negative for chills and fever.   HENT:          Negative for throat swelling.   Respiratory:          Negative for respiratory difficulties.   Skin:         Positive for itching.   All other systems reviewed and are negative.       PAST MEDICAL HISTORY:  Past Medical History:   Diagnosis Date    Sleep apnea     Tenesmus 10/08/2020       PAST SURGICAL HISTORY:  Past Surgical History:   Procedure Laterality Date    NO PAST SURGERIES             CURRENT MEDICATIONS:    clindamycin (CLEOCIN) 300 MG capsule  ACCU-CHEK GUIDE TEST STRIPS strips  alcohol swabs PadM  azelastine (ASTELIN) 137 mcg (0.1 %) nasal spray  blood glucose monitoring (SOFTCLIX) lancets  doxazosin (CARDURA) 2 MG tablet  rosuvastatin (CRESTOR) 40 MG tablet        ALLERGIES:  No Known Allergies    FAMILY HISTORY:  Family History   Problem Relation Age of Onset    Diabetes Type 2  Mother     Lung Cancer Mother     Ovarian Cancer Mother     Breast Cancer Mother     Diabetes Type 2  Father     Heart Disease Father 69        Luan does not know what kind of heart disease    Kidney Disease Father     Diabetes Type 2  Sister     Diabetes Type 2  Sister     No Known Problems Brother     Sudden Death  "Paternal Uncle        SOCIAL HISTORY:   Social History     Socioeconomic History    Marital status:      Spouse name: None    Number of children: 6    Years of education: None    Highest education level: None   Tobacco Use    Smoking status: Former     Packs/day: 3.50     Years: 15.00     Additional pack years: 0.00     Total pack years: 52.50     Types: Cigarettes     Quit date: 4/1/2013     Years since quitting: 10.7     Passive exposure: Past    Smokeless tobacco: Never   Substance and Sexual Activity    Alcohol use: Never    Drug use: Never    Sexual activity: Yes     Partners: Female   Social History Narrative    Wife: Jennifer Khan. Kids: Zehra 2005, Kameron 2006, Zoë 2008, Elizabeth 2014, Cathy 2015.       VITALS:  BP (!) 160/99   Pulse 93   Temp 97.3  F (36.3  C) (Temporal)   Resp 18   Ht 1.6 m (5' 3\")   Wt 102.7 kg (226 lb 8 oz)   SpO2 94%   BMI 40.12 kg/m      PHYSICAL EXAM    General presentation: Alert, Vital signs reviewed. NAD  HENT: ENT inspection is normal. Oropharynx is moist and clear. TM and external auditory canal are both clear on the right.  Posterior oropharynx is clear.   Eye: Pupils are equal and reactive to light. EOMI  Neck: The neck is supple, with full ROM, with no evidence of meningismus.  No stridor.  Pulmonary: Currently in no acute respiratory distress. Normal, non labored respirations, the lung sounds are normal with good equal air movement. Clear to auscultation bilaterally.   Circulatory: Regular rate and rhythm. Peripheral pulses are strong and equal. No murmurs, rubs, or gallops.   Abdominal: The abdomen is soft. Nontender. No rigidity, guarding, or rebound. Bowel sounds normal.   Neurologic: Alert, oriented to person, place, and time. No motor deficit. No sensory deficit. Cranial nerves II through XII are intact.  Musculoskeletal: No extremity tenderness. Full range of motion in all extremities. No extremity edema.   Skin: Small patch of erythema, warmth, and swelling " noted behind the left ear.  There were no clear areas of crepitus or fluctuance noted.  1 erythematous papule was noted on the extensor surface of the left elbow and 2 erythematous papules were noted on the extensor surface of the right elbow.         I, Dara Maria, am serving as a scribe to document services personally performed by Flores Horton DO based on my observation and the provider's statements to me. I, Flores Horton, attest that Dara Maria is acting in a scribe capacity, has observed my performance of the services and has documented them in accordance with my direction.    Flores Horton DO  Emergency Medicine  [facility] EMERGENCY DEPARTMENT  [facility] [facility] [id number] [phone]       Flores Horton DO  01/04/24 0813

## 2024-01-04 NOTE — DISCHARGE INSTRUCTIONS
Take the antibiotic as directed to treat the suspected skin infection (cellulitis) behind your left ear.  It is also recommended that you continue using over-the-counter ibuprofen as needed for any further discomfort and Benadryl as needed for any further itching.  Your symptoms should improve after you have been on the antibiotics for 48 hours.  Please follow-up with your primary care provider for reevaluation or return back to ED sooner for any worsening pain, respiratory difficulty, fevers, or any other new or concerning symptoms.

## 2024-01-04 NOTE — ED TRIAGE NOTES
Pt has redness spreading on his bilateral hands which started yesterday. Pt also diagnosed with cellulitis here earlier today and placed on Clindamycin. Pt states he was told to come back if redness spreads, which it is. Pt denies airway issues, denies nausea, vomiting, and diarrhea.     Triage Assessment (Adult)       Row Name 01/04/24 1232          Triage Assessment    Airway WDL WDL        Respiratory WDL    Respiratory WDL WDL        Skin Circulation/Temperature WDL    Skin Circulation/Temperature WDL X  Bilateral hand redness        Cardiac WDL    Cardiac WDL WDL        Peripheral/Neurovascular WDL    Peripheral Neurovascular WDL WDL        Cognitive/Neuro/Behavioral WDL    Cognitive/Neuro/Behavioral WDL WDL

## 2024-01-27 ENCOUNTER — HEALTH MAINTENANCE LETTER (OUTPATIENT)
Age: 44
End: 2024-01-27

## 2024-04-22 ENCOUNTER — LAB (OUTPATIENT)
Dept: LAB | Facility: CLINIC | Age: 44
End: 2024-04-22
Payer: COMMERCIAL

## 2024-04-22 DIAGNOSIS — M54.2 NECK PAIN: ICD-10-CM

## 2024-04-22 DIAGNOSIS — K76.0 NONALCOHOLIC HEPATOSTEATOSIS: Chronic | ICD-10-CM

## 2024-04-22 DIAGNOSIS — G89.29 CHRONIC RIGHT SHOULDER PAIN: Primary | ICD-10-CM

## 2024-04-22 DIAGNOSIS — E11.65 TYPE 2 DIABETES MELLITUS WITH HYPERGLYCEMIA, WITHOUT LONG-TERM CURRENT USE OF INSULIN (H): ICD-10-CM

## 2024-04-22 DIAGNOSIS — M25.511 CHRONIC RIGHT SHOULDER PAIN: Primary | ICD-10-CM

## 2024-04-22 LAB
ALBUMIN SERPL BCG-MCNC: 4.8 G/DL (ref 3.5–5.2)
ALP SERPL-CCNC: 87 U/L (ref 40–150)
ALT SERPL W P-5'-P-CCNC: 86 U/L (ref 0–70)
ANION GAP SERPL CALCULATED.3IONS-SCNC: 11 MMOL/L (ref 7–15)
AST SERPL W P-5'-P-CCNC: 48 U/L (ref 0–45)
BILIRUB SERPL-MCNC: 0.5 MG/DL
BUN SERPL-MCNC: 19.4 MG/DL (ref 6–20)
CALCIUM SERPL-MCNC: 9.5 MG/DL (ref 8.6–10)
CHLORIDE SERPL-SCNC: 103 MMOL/L (ref 98–107)
CHOLEST SERPL-MCNC: 263 MG/DL
CREAT SERPL-MCNC: 0.86 MG/DL (ref 0.67–1.17)
CREAT UR-MCNC: 167 MG/DL
DEPRECATED HCO3 PLAS-SCNC: 24 MMOL/L (ref 22–29)
EGFRCR SERPLBLD CKD-EPI 2021: >90 ML/MIN/1.73M2
ERYTHROCYTE [DISTWIDTH] IN BLOOD BY AUTOMATED COUNT: 13 % (ref 10–15)
FASTING STATUS PATIENT QL REPORTED: ABNORMAL
GLUCOSE SERPL-MCNC: 149 MG/DL (ref 70–99)
HBA1C MFR BLD: 8.9 % (ref 0–5.6)
HCT VFR BLD AUTO: 47.2 % (ref 40–53)
HDLC SERPL-MCNC: 40 MG/DL
HGB BLD-MCNC: 15.6 G/DL (ref 13.3–17.7)
LDLC SERPL CALC-MCNC: 187 MG/DL
MCH RBC QN AUTO: 27.4 PG (ref 26.5–33)
MCHC RBC AUTO-ENTMCNC: 33.1 G/DL (ref 31.5–36.5)
MCV RBC AUTO: 83 FL (ref 78–100)
MICROALBUMIN UR-MCNC: 38.4 MG/L
MICROALBUMIN/CREAT UR: 22.99 MG/G CR (ref 0–17)
NONHDLC SERPL-MCNC: 223 MG/DL
PLATELET # BLD AUTO: 296 10E3/UL (ref 150–450)
POTASSIUM SERPL-SCNC: 4.3 MMOL/L (ref 3.4–5.3)
PROT SERPL-MCNC: 7.9 G/DL (ref 6.4–8.3)
RBC # BLD AUTO: 5.69 10E6/UL (ref 4.4–5.9)
SODIUM SERPL-SCNC: 138 MMOL/L (ref 135–145)
TRIGL SERPL-MCNC: 179 MG/DL
WBC # BLD AUTO: 9 10E3/UL (ref 4–11)

## 2024-04-22 PROCEDURE — 83036 HEMOGLOBIN GLYCOSYLATED A1C: CPT

## 2024-04-22 PROCEDURE — 36415 COLL VENOUS BLD VENIPUNCTURE: CPT

## 2024-04-22 PROCEDURE — 82570 ASSAY OF URINE CREATININE: CPT

## 2024-04-22 PROCEDURE — 80053 COMPREHEN METABOLIC PANEL: CPT

## 2024-04-22 PROCEDURE — 85027 COMPLETE CBC AUTOMATED: CPT

## 2024-04-22 PROCEDURE — 80061 LIPID PANEL: CPT

## 2024-04-22 PROCEDURE — 82043 UR ALBUMIN QUANTITATIVE: CPT

## 2024-04-22 NOTE — PROGRESS NOTES
Luan was in for his kids' appointment today and wonders what to do for his shoulder pain. He has a lump in his right neck ~3cm, soft mobile. Muscle/soft consistency. Painful. And right shoulder pain all around the shoulder girdle. He did have some neck pain and swelling and pain in his elbows in January that was treated for cellulitis. Also due for labs. He wasn't seen today, but labs, imaging and PT ordered to get the ball rolling until he gets in for his physical.    Diagnoses and all orders for this visit:    Chronic right shoulder pain  -     US Head Neck Soft Tissue; Future  -     Physical Therapy  Referral; Future    Neck pain  -     US Head Neck Soft Tissue; Future  -     Physical Therapy  Referral; Future    Type 2 diabetes mellitus with hyperglycemia, without long-term current use of insulin (H)  -     CBC with platelets; Future  -     Comprehensive metabolic panel (BMP + Alb, Alk Phos, ALT, AST, Total. Bili, TP); Future  -     Hemoglobin A1c; Future  -     Lipid panel reflex to direct LDL Fasting; Future  -     Albumin Random Urine Quantitative with Creat Ratio; Future  -     Adult Diabetes Education  Referral; Future    Nonalcoholic hepatosteatosis  -     Comprehensive metabolic panel (BMP + Alb, Alk Phos, ALT, AST, Total. Bili, TP); Future    No future appointments.

## 2024-04-24 ENCOUNTER — THERAPY VISIT (OUTPATIENT)
Dept: PHYSICAL THERAPY | Facility: REHABILITATION | Age: 44
End: 2024-04-24
Attending: FAMILY MEDICINE
Payer: COMMERCIAL

## 2024-04-24 DIAGNOSIS — M54.2 NECK PAIN: ICD-10-CM

## 2024-04-24 DIAGNOSIS — G89.29 CHRONIC RIGHT SHOULDER PAIN: Primary | ICD-10-CM

## 2024-04-24 DIAGNOSIS — M25.511 CHRONIC RIGHT SHOULDER PAIN: Primary | ICD-10-CM

## 2024-04-24 PROCEDURE — 97161 PT EVAL LOW COMPLEX 20 MIN: CPT | Mod: GP | Performed by: PHYSICAL THERAPIST

## 2024-04-24 PROCEDURE — 97110 THERAPEUTIC EXERCISES: CPT | Mod: GP | Performed by: PHYSICAL THERAPIST

## 2024-04-24 NOTE — PROGRESS NOTES
PHYSICAL THERAPY EVALUATION  Type of Visit: Evaluation    See electronic medical record for Abuse and Falls Screening details.    Subjective       Presenting condition or subjective complaint: Pain from shoulder to neck  Date of onset: 04/17/24    Relevant medical history: Diabetes; Migraines or headaches; Overweight   Dates & types of surgery: None    Prior diagnostic imaging/testing results:       Prior therapy history for the same diagnosis, illness or injury: No      Patient reports that his pain started to get worse on 4/17. If he tries to scratch his back, he will feel it or move certain ways. Pain is under the armpit, in the chest, and then in the joint. He is supposed to be getting imaging done of the swelling in his neck.  Pain is only on the R side. This affects the neck and gets twitching in the head on the R side. Icy hot does cool down the pain but doesn't take it away. Lying on the R shoulder is very painful. In April, he was sleeping on his R side and woke with pain in the morning.    Prior Level of Function  Transfers: Independent  Ambulation: Independent  ADL: Independent  IADL: Childcare, Driving, Finances, Housekeeping, Laundry, Meal preparation, Medication management, Work, Yard work    Living Environment  Social support: With a significant other or spouse (and kids)   Type of home: House   Stairs to enter the home: Yes 4 Is there a railing: Yes   Ramp: No   Stairs inside the home: Yes 13 Is there a railing: Yes   Help at home: Self Cares (home health aide/personal care attendant, family, etc)  Equipment owned:       Employment: No    Hobbies/Interests:      Patient goals for therapy: Lots of things    Pain assessment: Pain present  Location: R shoulder/neck/Rating: 3-4/10     Objective   SHOULDER EVALUATION  PAIN: Pain Level at Rest: 4/10  Pain Level with Use: 10/10  Pain Location: cervical spine and shoulder  Pain Quality: Burning and muscle spasm  Pain Frequency: intermittent  Pain is Worst:  daytime  Pain is Exacerbated By: Lying on the R shoulder, carry kids, washing his back  Pain is Relieved By: otc medications  Pain Progression: Improved d/t no use of the R shoulder  INTEGUMENTARY (edema, incisions):  Mild edema superior to clavicle R  POSTURE: Sitting Posture: Rounded shoulders, Forward head, Lordosis increased, Thoracic kyphosis increased  ROM:   (Degrees) Left AROM Left PROM Right AROM  Right PROM   Shoulder Flexion WNL   WNL with pain    Shoulder Extension       Shoulder Abduction WNL  WNL with pain    Shoulder Adduction       Shoulder Internal Rotation WNL  WNL with pain    Shoulder External Rotation WNL  WNL with pain    Shoulder Horizontal Abduction       Shoulder Horizontal Adduction       Shoulder Flexion ER       Shoulder Flexion IR       Elbow Extension WNL  WNL    Elbow Flexion WNL  WNL    Pain:   End feel:   STRENGTH:   Pain: - none + mild ++ moderate +++ severe  Strength Scale: 0-5/5 Left Right   Shoulder Flexion 5 +++ (severe)   Shoulder Extension     Shoulder Abduction 5 +++ (severe)   Shoulder Adduction     Shoulder Internal Rotation 5 5   Shoulder External Rotation 5 4+   Shoulder Horizontal Abduction     Shoulder Horizontal Adduction     Elbow Flexion 5 5   Elbow Extension 5 5   Mid Trap     Lower Trap     Rhomboid     Serratus Anterior       SPECIAL TESTS:    Left Right   Impingement     Neer's  Positive   Hawkin's-Jorden  Positive   Coracoid Impingement     Internal impingement     Labral     Anterior Slide     Parsons's     Crank  Negative    Instability     Apprehension (anterior)  Negative    Relocation (anterior)     Anterior Load & Shift     Posterior Load & Shift     Posterior instability (with 90 degrees flex)     Multi-Directional Instability      Sulcus     Biceps      Speed's     Rotator Cuff Tear     Drop Arm     Belly Press     Lift off        CERVICAL SPECIAL TESTS: Negative compression and Spurling's tests bilaterally  PALPATION:  Tenderness at R subacromial  space, R pec muscle and anterior shoulder  JOINT MOBILITY: Not tested d/t guarding  CERVICAL SCREEN:  26 deg flexion; 36 deg extension; SB L 20 deg, R 30 deg with pain L; Rot 53 L, 46 deg R    Assessment & Plan   CLINICAL IMPRESSIONS  Medical Diagnosis: Chronic right shoulder pain  Neck pain    Treatment Diagnosis: R shoulder impingement   Impression/Assessment: Patient is a 43 year old male with complaints of acute R shoulder pain that began on 4/17/24 without injury.  The following significant findings have been identified: Pain, Decreased strength, Impaired muscle performance, and Impaired posture. These impairments interfere with their ability to perform self care tasks, recreational activities, household chores, and driving  as compared to previous level of function. His symptoms are most consistent with impingement syndrome of the R shoulder.  He is appropriate for skilled 1:1 PT services to address the listed impairments and return to function.    Clinical Decision Making (Complexity):  Clinical Presentation: Stable/Uncomplicated  Clinical Presentation Rationale: based on medical and personal factors listed in PT evaluation  Clinical Decision Making (Complexity): Low complexity    PLAN OF CARE  Treatment Interventions:  Interventions: Manual Therapy, Neuromuscular Re-education, Therapeutic Activity, Therapeutic Exercise, Self-Care/Home Management    Long Term Goals     PT Goal 1  Goal Identifier: Self-management/HEP  Goal Description: Patient will be independent in self-management of condition and HEP to reach functional goals.  Target Date: 06/19/24  PT Goal 2  Goal Identifier: Self-Care  Goal Description: Patient will be able to dress and shower independently without pain in the shoulder in order to perform self-cares.  Target Date: 06/19/24  PT Goal 3  Goal Identifier: Sleeping  Goal Description: Patient will be able to sleep on the R side without pain in the shoulder in order to improve QOL.  Target Date:  06/19/24  PT Goal 4  Goal Identifier: Dependent care  Goal Description: Patient will be able to provide dependent care for his 2 year old and be able to carry them without pain in the shoulder.  Target Date: 06/19/24      Frequency of Treatment: 1x/week to every other week  Duration of Treatment: 8 weeks    Recommended Referrals to Other Professionals: None  Education Assessment:   Learner/Method: Patient;Listening;Demonstration    Risks and benefits of evaluation/treatment have been explained.   Patient/Family/caregiver agrees with Plan of Care.     Evaluation Time:     PT Eval, Low Complexity Minutes (53756): 30     Signing Clinician: Dasha Hardwick, PT, DPT, MHA

## 2024-05-01 ENCOUNTER — THERAPY VISIT (OUTPATIENT)
Dept: PHYSICAL THERAPY | Facility: REHABILITATION | Age: 44
End: 2024-05-01
Payer: COMMERCIAL

## 2024-05-01 DIAGNOSIS — M25.511 CHRONIC RIGHT SHOULDER PAIN: Primary | ICD-10-CM

## 2024-05-01 DIAGNOSIS — G89.29 CHRONIC RIGHT SHOULDER PAIN: Primary | ICD-10-CM

## 2024-05-01 DIAGNOSIS — M54.2 NECK PAIN: ICD-10-CM

## 2024-05-01 PROCEDURE — 97110 THERAPEUTIC EXERCISES: CPT | Mod: GP | Performed by: PHYSICAL THERAPIST

## 2024-05-01 PROCEDURE — 97140 MANUAL THERAPY 1/> REGIONS: CPT | Mod: GP | Performed by: PHYSICAL THERAPIST

## 2024-05-04 ENCOUNTER — HOSPITAL ENCOUNTER (OUTPATIENT)
Dept: ULTRASOUND IMAGING | Facility: HOSPITAL | Age: 44
Discharge: HOME | End: 2024-05-04
Attending: FAMILY MEDICINE | Admitting: FAMILY MEDICINE
Payer: COMMERCIAL

## 2024-05-04 DIAGNOSIS — M25.511 CHRONIC RIGHT SHOULDER PAIN: ICD-10-CM

## 2024-05-04 DIAGNOSIS — M54.2 NECK PAIN: ICD-10-CM

## 2024-05-04 DIAGNOSIS — G89.29 CHRONIC RIGHT SHOULDER PAIN: ICD-10-CM

## 2024-05-04 PROCEDURE — 76536 US EXAM OF HEAD AND NECK: CPT

## 2024-05-15 ENCOUNTER — VIRTUAL VISIT (OUTPATIENT)
Dept: EDUCATION SERVICES | Facility: CLINIC | Age: 44
End: 2024-05-15
Attending: FAMILY MEDICINE
Payer: COMMERCIAL

## 2024-05-15 DIAGNOSIS — E11.65 TYPE 2 DIABETES MELLITUS WITH HYPERGLYCEMIA, WITHOUT LONG-TERM CURRENT USE OF INSULIN (H): ICD-10-CM

## 2024-05-15 PROCEDURE — G0108 DIAB MANAGE TRN  PER INDIV: HCPCS | Mod: 95 | Performed by: DIETITIAN, REGISTERED

## 2024-05-15 NOTE — PROGRESS NOTES
"Diabetes Self-Management Education & Support    Luan Morgan is a 43 year old who presents today for education related to Type 2 Diabetes  Patient is being treated with:  diet and exercise  He is accompanied by self    Year of diagnosis: 4 years   Referring provider:  Shahla Joaquin  Living Situation: with daughter, wife  Employment: did not ask    PATIENT CONCERNS RELATED TO DIABETES SELF MANAGEMENT: not feeling hungry recently. Has tried to change his diet and lose weight, but always regains        SUBJECTIVE / OBJECTIVE:  Diabetes education in the past 24mo: No  Diabetes type: Type 2  Disease course: Stable  How confident are you filling out medical forms by yourself:: Somewhat      Monitoring:  Blood Glucose Meter: Accu-chek  Times checking blood sugar at home (number): 2  Times checking blood sugar at home (per): Week  BG results: f  Glucose:  129  fasting glucose- 173 , BP: 151/102   CGM: none    Labs:  Lab Results   Component Value Date    A1C 8.9 04/22/2024     Lab Results   Component Value Date     04/22/2024     01/11/2022     Lab Results   Component Value Date     04/22/2024     04/15/2021     Direct Measure HDL   Date Value Ref Range Status   04/22/2024 40 >=40 mg/dL Final     GFR Estimate   Date Value Ref Range Status   04/22/2024 >90 >60 mL/min/1.73m2 Final   12/18/2020 >60 >60 mL/min/1.73m2 Final     Lab Results   Component Value Date    CR 0.86 04/22/2024     No results found for: \"MICROALBUMIN\"  No results found for: \"GADAB\"  No results found for: \"CPEPT\"      Diabetes Symptoms & Complications:  Diabetes Related Symptoms: Fatigue, Neuropathy, Polydipsia (increased thirst), Polyuria (increased urination), Visual change  Weight trend: Stable  Symptom course: Stable  Disease course: Stable         Taking Medications:    Current Treatments: None    Problem Solving:             Patient is at risk of hypoglycemia?: NO  Hypoglycemia symptoms: none  Hospitalizations for hyper or " "hypoglycemia: No    Reducing Risks:  Has dilated eye exam at least once a year?: No  Sees dentist every 6 months?: Yes  Feet checked by healthcare provider in the last year?: No    Patient's most recent   Lab Results   Component Value Date    A1C 8.9 04/22/2024    A1C 6.3 04/24/2023      Patient's A1C goal: <7.0    Being Active:  Barrier to exercise: None  Walks on Saturdays - Sundays, for about 4 miles  Walks in the park with kids    Nutrition:     Cultural/Anabaptist diet restrictions?: No  How many times a week on average do you eat food made away from home (restaurant/take-out)?: 3  Meals include: Lunch, Dinner, Afternoon Snack  Beverages: Water, Juice, Soda, Coffee drinks  Lost appetite -  Usually 2 meals a day    Meal Time Food   Breakfast  Cereal,   Banana      Lunch  White rice, pizza, bowl of salad     Dinner 5-6 pm Ramen noodles, kimchi, strawberries, blueberries  Fried chicken     Snack(s)  Tracy and snickers     Beverage(s)  Oat milk and water       Self, wife, and daughter does food shopping and cooking  All help each other cook    Fruits and vegetables: almost everyday,       Healthy Coping:  Informal Support system:: Children, Family, Spouse  Sources of stress identified by patient: My health      EDUCATION and INSTRUCTION PROVIDED AT THIS VISIT:    T2DM seen for Comprehensive Knowledge Assessment and Instruction at the request of Dr. Joaquin. Luan tells me that he was diagnosed with diabetes 4 years ago. Most recent A1C was 8.9, up from 6.3 last year. Luan has tried to make diet changes and lose weight but always regains weight. He would like to do things \"the natural way\" and avoid medications if possible. We discussed relationship between carbohydrate and glucose, how to identify carbohydrate containing foods, and planning balanced meals. Discussed exercise as a \"natural\" way to lower blood sugars. Discussed various diabetes medications and how they work.   Can see if personal CGM will be covered by " insurance, I think this will help Luan see the impact of different foods and activities on his blood glucoses.   Will have pharmacy team run test claims on different classes of medications and see if options are affordable. He may benefit from a diabetes medication that will also help with weight loss, such as GLP-1 or SGLT-2 inhibitor      Topics that were covered in today's visit:  Basic pathophysiology of T2DM, relationship between carbohydrate intake, release of glucose from the liver, exercise and weight management on glucose control.    Self-monitoring of blood glucose (SMBG) using blood glucose meter and/or continuous glucose monitoring device.    Target blood glucose for pre-meal and 2 hour post-prandial glucose.    Action, timing, and potential side-effects of diabetes medications:     Basic meal planning using the plate method, emphasizing portion control, healthy food choices, and eliminating or minimizing sugary beverages.    Need for consistent physical activity, 30-45 minutes duration, preferably 4-6 days per week.         Patient-stated goal written and given to Luan Morgan.  Verbalized and demonstrated understanding of instructions.     PLAN:  Continue monitoring blood sugars  Work on diet and lifestyle changes  Consider adding diabetes medications, especially GLP-1 or SGLT-2 if cost is reasonable.      FOLLOW-UP:    6/11/2024 with Maria M         See patient instructions.  AVS provided to patient.    Time spent with patient at today's visit was 36 minutes.      Any diabetes medication initiation or dose changes were made via the [facility] Standing Orders per the patient's referring provider. A copy of this encounter was shared with the provider.

## 2024-05-15 NOTE — RESULT ENCOUNTER NOTE
Please touch base with Roland.  His results have a number of abnormal findings:    1) Too much protein in the urine. This is one of the first signs of kidney damage from diabetes. The best way to prevent further kidney damage is to take really good care of diabetes, cholesterol, and blood pressure.  2) His cholesterol is too high. He definitely needs to be on the rosuvastatin for cholesterol management. But his cholesterol went up since we last checked. Is he missing his medicine or eating differently? It's high enough that I would consider adding another, injectable cholesterol medication if he doesn't think he can improve it otherwise.  3) His liver tests are still high. I think it's probably a good idea to start a medication to help him lose weight. Would he be interested in using a once a week injection to treat diabetes and lose weight? I think he's a good candidate.  4) His blood sugars are too high. Higher than in the past. I think the semaglutide (once weekly shot) would be a good choice for him. If he doesn't want to do it yet, I would use a daily pill, called empagliflozin that can help with diabetes, weight, and also reduce the risk of kidney disease.    Please let me know what he says AND schedule a follow up appointment with me because it's a lot to go over.

## 2024-05-15 NOTE — NURSING NOTE
Is the patient currently in the state of [place]? YES    Visit mode:VIDEO    If the visit is dropped, the patient can be reconnected by: VIDEO VISIT: Text to cell phone:   Telephone Information:   Mobile [phone]       Will anyone else be joining the visit? NO  (If patient encounters technical issues they should call [phone] :[phone])    How would you like to obtain your AVS? MyChart    Are changes needed to the allergy or medication list? Pt stated no med changes    Are refills needed on medications prescribed by this physician? NO    Reason for visit: Consult (Diabetes Type: Type 2 Type 2 diabetes mellitus with hyperglycemia, without long-term current use of insulin)    Coby Palm VVF    Pt has throat pain - pain level 3   PT has kids so states that doing hobbies and activities is hard but if he could he would like to do them.

## 2024-05-15 NOTE — PROGRESS NOTES
Virtual Visit Details    Type of service:  Video Visit   Joined the call at 5/15/2024, 8:06:25 am.  Left the call at 5/15/2024, 8:43:19 am.  You were on the call for 36 minutes 54 seconds .  Originating Location (pt. Location): Home    Distant Location (provider location):  On-site  Platform used for Video Visit: MajoWell   36.6

## 2024-05-16 DIAGNOSIS — E78.5 DYSLIPIDEMIA, GOAL LDL BELOW 70: ICD-10-CM

## 2024-05-16 RX ORDER — ROSUVASTATIN CALCIUM 40 MG/1
40 TABLET, COATED ORAL AT BEDTIME
Qty: 90 TABLET | Refills: 4 | Status: SHIPPED | OUTPATIENT
Start: 2024-05-16 | End: 2024-05-20

## 2024-05-16 NOTE — TELEPHONE ENCOUNTER
"Contacted patient and relayed result message.    Patient reports he has been managing cholesterol through lifestyle modifications--\"eating very natural, lots of lettuce\". He is agreeable to starting rosuvastatin, refill pended.  He is agreeable to once-weekly injection for diabetes and weight loss.  Tentatively scheduled follow-up appointment 5/20/24 at 7:00--this is a DB. Patient is only available early mornings on Mondays and Fridays, anxious to discuss results. Writer advised patient care team may reach out to change appt if DB not appropriate--please advise RN team if you would like this appt changed.    Future Appointments 5/16/2024 - 11/12/2024        Date Visit Type Length Department Provider     5/20/2024  7:00 AM OFFICE VISIT 20 min [facility] Shahla Joaquin MD    Location Instructions:     [facility] is located at [address] [place] [address] in [place], at the intersection of [facility]. This is one block south of the [facility]. Free parking is available in the lot directly north of the clinic across [facility]. The clinic is near stops along bus [address] and [address].              6/11/2024  8:00 AM DIABETES ED 60 min [facility]ET[facility] Maria M Garcia RD, LD    Location Instructions:     The [facility] ([facility]) is in a dense urban area with multiple transportation and parking options. You may wish to review options for  service and self-parking in more detail on the [facility] s website at [email or web address].                    "

## 2024-05-16 NOTE — TELEPHONE ENCOUNTER
----- Message from Shahla Joaquin MD sent at 5/15/2024  6:24 PM CDT -----  Please touch base with Roland.  His results have a number of abnormal findings:    1) Too much protein in the urine. This is one of the first signs of kidney damage from diabetes. The best way to prevent further kidney damage is to take really good care of diabetes, cholesterol, and blood pressure.  2) His cholesterol is too high. He definitely needs to be on the rosuvastatin for cholesterol management. But his cholesterol went up since we last checked. Is he missing his medicine or eating differently? It's high enough that I would consider adding another, injectable cholesterol medication if he doesn't think he can improve it otherwise.  3) His liver tests are still high. I think it's probably a good idea to start a medication to help him lose weight. Would he be interested in using a once a week injection to treat diabetes and lose weight? I think he's a good candidate.  4) His blood sugars are too high. Higher than in the past. I think the semaglutide (once weekly shot) would be a good choice for him. If he doesn't want to do it yet, I would use a daily pill, called empagliflozin that can help with diabetes, weight, and also reduce the risk of kidney disease.    Please let me know what he says AND schedule a follow up appointment with me because it's a lot to go over.

## 2024-05-17 NOTE — PATIENT INSTRUCTIONS
"Abisai Roland,    It was a pleasure meeting with you today!    Healthy Living with Diabetes     Goals for Your Diabetes Care    Guide to Carbohydrate Counting: A Simple Meal-Planning Method for People with Diabetes    MyPlate Planner Meal-planning tool for diabetes patients.       Goals for Diabetes:    Check blood sugars at least 1 time(s) each day at varying times: before meals, after meals, and at bedtime.    Blood Glucose Targets:   Fasting and before meal: 80 - 130   2 hours after the start of a meal: less than 180    Continuous Glucose Monitor Goals:    Glucose Target Range  Recommended Time in Target Range   70 to 180 70% or more of the time   Less than 70 Less than 4% of the time   Above 180 Less than 25% of time       Activity helps to improve blood sugars. Try to incorporate 150 minutes per week (at least 10 minute at a time, and at least every other day)    Eat three balanced meals each day, consider using plate planning method, aiming for a variety of food groups including 1/4 plate starch/grains, 1/4 plate lean protein, and 1/2 plate non-starchy vegetables. Focus on \"high quality\" carbohydrates (whole grains, starchy vegetables, fresh fruits, beans/legumes). Limit added sugar and refined carbohydrates as much as possible.      If you are overweight aim for a 5% weight loss. This will improve blood sugars, cholesterol, and blood pressure!     Follow up:  6/11/2024 with Maria M    Call or Mychart with any questions.     "

## 2024-05-20 ENCOUNTER — OFFICE VISIT (OUTPATIENT)
Dept: FAMILY MEDICINE | Facility: CLINIC | Age: 44
End: 2024-05-20
Payer: COMMERCIAL

## 2024-05-20 VITALS
DIASTOLIC BLOOD PRESSURE: 82 MMHG | RESPIRATION RATE: 16 BRPM | OXYGEN SATURATION: 97 % | HEART RATE: 79 BPM | HEIGHT: 63 IN | TEMPERATURE: 98.2 F | WEIGHT: 222 LBS | SYSTOLIC BLOOD PRESSURE: 128 MMHG | BODY MASS INDEX: 39.34 KG/M2

## 2024-05-20 DIAGNOSIS — Z13.88 SCREENING FOR LEAD EXPOSURE: ICD-10-CM

## 2024-05-20 DIAGNOSIS — E78.5 DYSLIPIDEMIA, GOAL LDL BELOW 70: ICD-10-CM

## 2024-05-20 DIAGNOSIS — E11.65 TYPE 2 DIABETES MELLITUS WITH HYPERGLYCEMIA, WITHOUT LONG-TERM CURRENT USE OF INSULIN (H): ICD-10-CM

## 2024-05-20 DIAGNOSIS — K76.0 NONALCOHOLIC HEPATOSTEATOSIS: Primary | Chronic | ICD-10-CM

## 2024-05-20 DIAGNOSIS — E66.01 MORBID OBESITY (H): ICD-10-CM

## 2024-05-20 PROCEDURE — 83655 ASSAY OF LEAD: CPT | Mod: 90 | Performed by: FAMILY MEDICINE

## 2024-05-20 PROCEDURE — 36415 COLL VENOUS BLD VENIPUNCTURE: CPT | Performed by: FAMILY MEDICINE

## 2024-05-20 PROCEDURE — 99214 OFFICE O/P EST MOD 30 MIN: CPT | Performed by: FAMILY MEDICINE

## 2024-05-20 PROCEDURE — G2211 COMPLEX E/M VISIT ADD ON: HCPCS | Performed by: FAMILY MEDICINE

## 2024-05-20 PROCEDURE — 99000 SPECIMEN HANDLING OFFICE-LAB: CPT | Performed by: FAMILY MEDICINE

## 2024-05-20 RX ORDER — ROSUVASTATIN CALCIUM 40 MG/1
40 TABLET, COATED ORAL AT BEDTIME
Qty: 90 TABLET | Refills: 4 | Status: SHIPPED | OUTPATIENT
Start: 2024-05-20

## 2024-05-20 ASSESSMENT — PAIN SCALES - GENERAL: PAINLEVEL: NO PAIN (0)

## 2024-05-20 NOTE — PATIENT INSTRUCTIONS
Nonalcoholic Steatohepatitis ([place]) - fatty liver with inflammation  1) Repeat ultrasound of liver  2) Lose weight    Microalbuminuria - small amount of protein in urine  Take really good care of diabetes + cholesterol.    High LDL  1) Take cholesterol medicine  2) Eat low cholesterol diet    Diabetes  Mounjaro Initial, 2.5 mg subQ once weekly for 4 weeks, then increase to 5 mg subQ once weekly; if additional glycemic control is needed, increase dosage in 2.5-mg increments (once weekly) after at least 4 weeks on the current dose; MAX, 15 mg subQ once weekly    Weight  Lose 10% of your weight  Start Mounjaro  Target 8 hours of sleep

## 2024-05-20 NOTE — PROGRESS NOTES
"Office Visit  [facility] Medicine  Date of Service: May 20, 2024      Subjective   Luan Morgan is a 43 year old male who presents for   Chief Complaint   Patient presents with    Diabetes     Abnormal LFTs due to KNOX  ALT 86<98<70<107<41  AST 48<53<46<59<29  CT 3/16/23: Hepatic steatosis. Mild contour nodularity suggestive of fibrosis and/or early cirrhosis.   Hep A immunized, Hep B immune, hep C negative, normal iron levels.  Not drinking alcohol.    High cholesterol  Wants to lower cholesterol with diet/exercise so stopped statin.  <138<246    Trying to lose weight  Weight not coming down    Diabetes  Eating better and more active  New microalbuminuria  Wonders about SGLT-2 and GLP-1    Recurent infections - stomach, jaw    Neck pain  Seeing PT - lots of muscle tension in neck and upper back  Waking him at night    Hand swelling   Better now    Objective   /82 (BP Location: Left arm, Patient Position: Sitting, Cuff Size: Adult Large)   Pulse 79   Temp 98.2  F (36.8  C) (Temporal)   Resp 16   Ht 1.59 m (5' 2.6\")   Wt 100.7 kg (222 lb)   SpO2 97%   BMI 39.83 kg/m   He reports that he quit smoking about 11 years ago. His smoking use included cigarettes. He started smoking about 26 years ago. He has a 52.5 pack-year smoking history. He has been exposed to tobacco smoke. He has never used smokeless tobacco.  BP Readings from Last 10 Encounters:   05/20/24 128/82   01/04/24 132/81   01/04/24 (!) 160/99   05/01/23 129/80   03/08/23 126/83   05/06/21 125/78   05/06/21 125/78   12/03/20 118/80   11/10/20 105/68   10/26/20 114/74     Wt Readings from Last 10 Encounters:   05/20/24 100.7 kg (222 lb)   01/04/24 102.5 kg (226 lb)   01/04/24 102.7 kg (226 lb 8 oz)   05/01/23 98 kg (216 lb)   03/08/23 96.6 kg (213 lb)   12/04/20 89.4 kg (197 lb)   12/03/20 88.9 kg (196 lb)   11/10/20 88.9 kg (196 lb)   10/26/20 89.4 kg (197 lb)   10/15/20 93.9 kg (207 lb)     Gen: Alert, no " apparent distress.  Neck: no significant lymph node enlargement or mass. Tight muscles on neck.    No results found for any visits on 05/20/24.  Assessment & Plan     KNOX - with possible fibrosis.   Check liver US with elastography  Low risk (<1.30) of advanced fibrosis.  Weight loss recommendation: 10%. Diet, exercise. GLP-1 prescribed.  Immune to hep A &B  DM2 with hyperglycemia and microalbuminuria  Continue with lifestyle modification  Add Mounjaro for DM2, KNOX, and BMI 40. 2.5 mg for 4 weeks, then 5 mg weekly.  Consider empagliflozin once we evaluate effect of Mounjaro.  Recheck in 3 months  Familial hyperlipidemia  Discussed that he is at high risk for cardiovascular disease. Strongly encourage statin.  Discussed 1st degree relatives being tested for hyperlipidemia  Continue low cholesterol diet.  BMI40 complicated by KNOX, hyperlipidemia, diabetes, neck/back pain, GERD.  Start Mounjaro.       The longitudinal plan of care for the diagnosis(es)/condition(s) as documented were addressed during this visit. Due to the added complexity in care, I will continue to support Roland in the subsequent management and with ongoing continuity of care.    Order Summary                                                      Nonalcoholic hepatosteatosis  -     US Abdomen Limited; Future  -     US Elastography Only; Future  -     tirzepatide (MOUNJARO) 2.5 MG/0.5ML pen; Inject 2.5 mg Subcutaneous every 7 days  -     tirzepatide (MOUNJARO) 5 MG/0.5ML pen; Inject 5 mg Subcutaneous every 7 days for 28 days    Type 2 diabetes mellitus with hyperglycemia, without long-term current use of insulin (H)  -     Adult Eye  Referral; Future  -     tirzepatide (MOUNJARO) 2.5 MG/0.5ML pen; Inject 2.5 mg Subcutaneous every 7 days  -     tirzepatide (MOUNJARO) 5 MG/0.5ML pen; Inject 5 mg Subcutaneous every 7 days for 28 days    Dyslipidemia, goal LDL below 70  -     rosuvastatin (CRESTOR) 40 MG tablet; Take 1 tablet (40 mg) by mouth at  bedtime  -     tirzepatide (MOUNJARO) 2.5 MG/0.5ML pen; Inject 2.5 mg Subcutaneous every 7 days  -     tirzepatide (MOUNJARO) 5 MG/0.5ML pen; Inject 5 mg Subcutaneous every 7 days for 28 days    Obesity (BMI 35.0-39.9) with comorbidity (H)  -     tirzepatide (MOUNJARO) 2.5 MG/0.5ML pen; Inject 2.5 mg Subcutaneous every 7 days  -     tirzepatide (MOUNJARO) 5 MG/0.5ML pen; Inject 5 mg Subcutaneous every 7 days for 28 days    Screening for lead exposure  -     Lead Venous Blood Confirm; Future  -     Lead Venous Blood Confirm    Other orders  -     COVID-19 12+ (2023-24) (PFIZER)  -     REVIEW OF HEALTH MAINTENANCE PROTOCOL ORDERS            Future Appointments   Date Time Provider Department Center   6/11/2024  8:00 AM Maria M Garcia, RD, LD [facility]       Completed by: Shahla Joaquin M.D., [facility]. 5/20/2024 7:13 AM.  This transcription uses voice recognition software, which may contain typographical errors.  MDM: SDOH neighborhood: [place] [address], language: English,.  Answers submitted by the patient for this visit:  Lipid Visit (Submitted on 5/20/2024)  Chief Complaint: Chronic problems general questions HPI Form  Are you regularly taking any medication or supplement to lower your cholesterol?: No  Are you having muscle aches or other side effects that you think could be caused by your cholesterol lowering medication?: Yes  Hypertension Visit (Submitted on 5/20/2024)  Chief Complaint: Chronic problems general questions HPI Form  Do you check your blood pressure regularly outside of the clinic?: Yes  Are your blood pressures ever more than 140 on the top number (systolic) OR more than 90 on the bottom number (diastolic)? (For example, greater than 140/90): Yes  Are you following a low salt diet?: Yes  Diabetes Visit (Submitted on 5/20/2024)  Chief Complaint: Chronic problems general questions HPI Form  Frequency of checking blood sugars:: four or more times daily  What time of day are  you checking your blood sugars : before meals, after meals, before and after meals, at bedtime  Have you had any blood sugars above 200?: Yes  Have you had any blood sugars below 70?: No  Hypoglycemia symptoms:: shakiness, dizziness, weakness  Diabetic concerns:: frequent infections  Paraesthesia present:: numbness in feet, burning in feet, excessive thirst, blurry vision, weight gain  Have you had a diabetic eye exam within the last year?: No  General Questionnaire (Submitted on 5/20/2024)  Chief Complaint: Chronic problems general questions HPI Form  What is the reason for your visit today? : diabets  How many servings of fruits and vegetables do you eat daily?: 4 or more  On average, how many sweetened beverages do you drink each day (Examples: soda, juice, sweet tea, etc.  Do NOT count diet or artificially sweetened beverages)?: 1  How many minutes a day do you exercise enough to make your heart beat faster?: 60 or more  How many days a week do you exercise enough to make your heart beat faster?: 5  How many days per week do you miss taking your medication?: 1

## 2024-05-22 LAB — LEAD BLDV-MCNC: <2 UG/DL

## 2024-05-30 NOTE — LETTER
May 30, 2024  Re: Luan Morgan  YOB: 1980    Dear Colleague,    Thank you for your referral to the [facility]. We have been unable to schedule the referral after several contact attempts.      If you have any questions or concerns, please contact our office at Dept: [phone].        Sincerely,  [facility]

## 2024-05-31 NOTE — PROGRESS NOTES
LMTCB #2, if pt calls back please help pt schedule an appt. as mention. Will call back Monday, thanks!

## 2024-06-03 NOTE — PROGRESS NOTES
Called and spoke with patient. Patient declined to schedule an appointment at this time. Patient will reach out to other locations to schedule his eye exam and will have records sent to us. No further action needed. Completing task.

## 2024-06-09 DIAGNOSIS — E11.65 TYPE 2 DIABETES MELLITUS WITH HYPERGLYCEMIA, WITHOUT LONG-TERM CURRENT USE OF INSULIN (H): ICD-10-CM

## 2024-06-09 DIAGNOSIS — K76.0 NONALCOHOLIC HEPATOSTEATOSIS: Chronic | ICD-10-CM

## 2024-06-09 DIAGNOSIS — E66.01 MORBID OBESITY (H): ICD-10-CM

## 2024-06-09 DIAGNOSIS — E78.5 DYSLIPIDEMIA, GOAL LDL BELOW 70: ICD-10-CM

## 2024-06-11 ENCOUNTER — VIRTUAL VISIT (OUTPATIENT)
Dept: EDUCATION SERVICES | Facility: CLINIC | Age: 44
End: 2024-06-11
Payer: COMMERCIAL

## 2024-06-11 VITALS — BODY MASS INDEX: 37.68 KG/M2 | WEIGHT: 210 LBS

## 2024-06-11 DIAGNOSIS — E78.5 DYSLIPIDEMIA, GOAL LDL BELOW 70: ICD-10-CM

## 2024-06-11 DIAGNOSIS — K76.0 NONALCOHOLIC HEPATOSTEATOSIS: Chronic | ICD-10-CM

## 2024-06-11 DIAGNOSIS — E66.01 MORBID OBESITY (H): ICD-10-CM

## 2024-06-11 DIAGNOSIS — E11.65 TYPE 2 DIABETES MELLITUS WITH HYPERGLYCEMIA, WITHOUT LONG-TERM CURRENT USE OF INSULIN (H): ICD-10-CM

## 2024-06-11 PROCEDURE — 99207 PR NO BILLABLE SERVICE THIS VISIT: CPT | Mod: 95 | Performed by: DIETITIAN, REGISTERED

## 2024-06-11 NOTE — PATIENT INSTRUCTIONS
Abisai Roland,    It was nice meeting with you today. Here is a summary of our visit and plan:    PLAN:  Increase Mounjaro to 5 mg weekly  Continue monitoring blood sugars at various times  Continue working diet and exercise    Helpful Tips for Mounjaro:  Eat smaller portions of food than you are used to. You will get full faster due to how the medicine works.  Eat more slowly than usual and stop eating at the first sign of fullness.  Avoid eating meals late at night and then lying down. This can increase the likelihood of acid reflux (indigestion or heartburn).  Order a take-home container in restaurants when you place your order. Put away half your portion so you eat less. Or keep portions small by choosing from healthy salads, appetizers, and/or sides rather than ordering a main course. Or split and share a main course.  Limit high-fat and greasy foods. They can take longer to digest and cause more GI symptoms.  Limit spicy foods if you regularly have heartburn/indigestion.  Use arlin or peppermint tea to settle your stomach if you experience nausea.  Eat enough high-fiber foods - fruits, vegetables, and whole grains - to minimize constipation.     FOLLOW-UP:    7/16/2024 with Maria M           Please call or MyChart if you have any questions.    Maria M MILLER  Diabetes Educator  [facility] [address] [address]  Phone: [phone]  Email: [email or web address]

## 2024-06-11 NOTE — NURSING NOTE
Is the patient currently in the state of [place]? YES    Visit mode:VIDEO    If the visit is dropped, the patient can be reconnected by: VIDEO VISIT: Text to cell phone:   Telephone Information:   Mobile [phone]       Will anyone else be joining the visit? NO  (If patient encounters technical issues they should call [phone] :[id number])    How would you like to obtain your AVS? MyChart    Are changes needed to the allergy or medication list? No    Are refills needed on medications prescribed by this physician? NO    Reason for visit: RECHECK and Video Visit    Zeynep STYLES

## 2024-06-11 NOTE — PROGRESS NOTES
Virtual Visit Details    Type of service:  Video Visit   Joined the call at 6/11/2024, 8:03:33 am.  Left the call at 6/11/2024, 8:16:40 am.  You were on the call for 13 minutes 6 seconds .  Originating Location (pt. Location): Home    Distant Location (provider location):  On-site  Platform used for Video Visit: [employer]

## 2024-06-11 NOTE — PROGRESS NOTES
Diabetes Self-Management Education & Support    Luan Morgan is a 43 year old who presents today for education related to Type 2 Diabetes  Patient is being treated with:  diet and exercise, Mounjaro  He is accompanied by self    Year of diagnosis: 4 years   Referring provider:  Shahla Joaquin  Living Situation: with daughter, wife  Employment: did not ask    PATIENT CONCERNS RELATED TO DIABETES SELF MANAGEMENT: started taking Mounjaro, already lost 12 lb. Feels a lot better, fasting glucoses and BP have improved    ASSESSMENT:    T2DM seen for Comprehensive Knowledge Assessment and Instruction at the request of Dr. Joaquin. Luan tells me that he was diagnosed with diabetes 4 years ago. Most recent A1C was 8.9, up from 6.3 last year. Luan has met with PCP and was started on Mounjaro. Also restarted on statin. He tells me he feels great. Feels much better than when we met last. Has lost 12 lb already. He feels hungry after giving injection but feeling goes away. Denies any side effects from Mounjaro. Trying to watch his diet and keep up with exercise. Encouraged him to continue working on those changes.  His fasting glucoses have also improved. Due for 4th dose of Mounjaro 2.5 mg weekly on Sunday and will increase dose to 5 mg weekly.    PLAN:  Increase Mounjaro to 5 mg weekly  Continue monitoring blood sugars at various times  Work on diet and exercise      FOLLOW-UP:    7/16/2024 with Maria M     SUBJECTIVE / OBJECTIVE:  Diabetes education in the past 24mo: No  Diabetes type: Type 2  Disease course: Stable  How confident are you filling out medical forms by yourself:: Somewhat      Monitoring:  Blood Glucose Meter: Accu-chek  Times checking blood sugar at home (number): 2  Times checking blood sugar at home (per): Week  BG results: fasting  fasting glucose- 89 mg/dL , BP: 130/80  CGM: none    Labs:  Lab Results   Component Value Date    A1C 8.9 04/22/2024     Lab Results   Component Value Date     04/22/2024    GLC  "114 01/11/2022     Lab Results   Component Value Date     04/22/2024     04/15/2021     Direct Measure HDL   Date Value Ref Range Status   04/22/2024 40 >=40 mg/dL Final     GFR Estimate   Date Value Ref Range Status   04/22/2024 >90 >60 mL/min/1.73m2 Final   12/18/2020 >60 >60 mL/min/1.73m2 Final     Lab Results   Component Value Date    CR 0.86 04/22/2024     No results found for: \"MICROALBUMIN\"  No results found for: \"GADAB\"  No results found for: \"CPEPT\"      Diabetes Symptoms & Complications:  Diabetes Related Symptoms: Fatigue, Neuropathy, Polydipsia (increased thirst), Polyuria (increased urination), Visual change  Weight trend: Stable  Symptom course: Stable  Disease course: Stable         Taking Medications:  Diabetes Medication(s)       Incretin Mimetic Agents       tirzepatide (MOUNJARO) 2.5 MG/0.5ML pen Inject 2.5 mg Subcutaneous every 7 days     tirzepatide (MOUNJARO) 5 MG/0.5ML pen Inject 5 mg Subcutaneous every 7 days for 28 days          Current Treatments: None    Problem Solving:             Patient is at risk of hypoglycemia?: NO  Hypoglycemia symptoms: none  Hospitalizations for hyper or hypoglycemia: No    Reducing Risks:  Has dilated eye exam at least once a year?: No  Sees dentist every 6 months?: Yes  Feet checked by healthcare provider in the last year?: No    Patient's most recent   Lab Results   Component Value Date    A1C 8.9 04/22/2024    A1C 6.3 04/24/2023      Patient's A1C goal: <7.0    Being Active:  Barrier to exercise: None  Walks on Saturdays - Sundays, for about 4 miles  Walks in the park with kids    Nutrition:     Cultural/Bahai diet restrictions?: No  How many times a week on average do you eat food made away from home (restaurant/take-out)?: 3  Meals include: Lunch, Dinner, Afternoon Snack  Beverages: Water, Juice, Soda, Coffee drinks  Lost appetite -  Usually 2 meals a day    Meal Time Food   Breakfast  Cereal,   Banana      Lunch  White rice, pizza, bowl " of salad     Dinner 5-6 pm Ramen noodles, kimchi, strawberries, blueberries  Fried chicken     Snack(s)  [place] and snickers     Beverage(s)  Oat milk and water       Self, wife, and daughter does food shopping and cooking  All help each other cook    Fruits and vegetables: almost everyday,       Healthy Coping:  Informal Support system:: Children, Family, Spouse  Sources of stress identified by patient: My health        Patient-stated goal written and given to Luan Brady  Verbalized and demonstrated understanding of instructions.     Time spent with patient at today's visit was 13 minutes.      Any diabetes medication initiation or dose changes were made via the CDCES Standing Orders per the patient's referring provider. A copy of this encounter was shared with the provider.

## 2024-06-19 RX ORDER — TIRZEPATIDE 2.5 MG/.5ML
2.5 INJECTION, SOLUTION SUBCUTANEOUS
Qty: 2 ML | Refills: 0 | OUTPATIENT
Start: 2024-06-19

## 2024-06-19 NOTE — TELEPHONE ENCOUNTER
Per chart review, patient met with diabetes education on 6/11. Mounjaro was increased to 5 mg/dose and was sent to pharmacy at that time. Refill request refused.

## 2024-07-11 ENCOUNTER — MYC MEDICAL ADVICE (OUTPATIENT)
Dept: EDUCATION SERVICES | Facility: CLINIC | Age: 44
End: 2024-07-11
Payer: COMMERCIAL

## 2024-07-11 ENCOUNTER — MYC MEDICAL ADVICE (OUTPATIENT)
Dept: FAMILY MEDICINE | Facility: CLINIC | Age: 44
End: 2024-07-11
Payer: COMMERCIAL

## 2024-07-11 DIAGNOSIS — E11.65 TYPE 2 DIABETES MELLITUS WITH HYPERGLYCEMIA, WITHOUT LONG-TERM CURRENT USE OF INSULIN (H): Primary | ICD-10-CM

## 2024-07-12 NOTE — TELEPHONE ENCOUNTER
Rescheduled appointment from 7/16 to 7/30. Luan is tolerating Mounjaro 5 mg, but feels like he has plateau-ed on the dose. Denies any side effects. Also making changes in diet - eating more fruits, veggies, eggs.  Will send in 7.5 mg dose and follow-up at the end of the month.    Maria M Garcia, RD, LD [facility]  Diabetes Educator

## 2024-07-15 NOTE — TELEPHONE ENCOUNTER
Writer responded via [employer].  ANTOINETTE CheekN, RN-BC  [facility]

## 2024-07-30 ENCOUNTER — VIRTUAL VISIT (OUTPATIENT)
Dept: EDUCATION SERVICES | Facility: CLINIC | Age: 44
End: 2024-07-30
Payer: COMMERCIAL

## 2024-07-30 DIAGNOSIS — E11.65 TYPE 2 DIABETES MELLITUS WITH HYPERGLYCEMIA, WITHOUT LONG-TERM CURRENT USE OF INSULIN (H): Primary | ICD-10-CM

## 2024-07-30 PROCEDURE — 99207 PR NO BILLABLE SERVICE THIS VISIT: CPT | Mod: 95 | Performed by: DIETITIAN, REGISTERED

## 2024-07-30 NOTE — PATIENT INSTRUCTIONS
Abisai Roland,    It was nice meeting with you today. Here is a summary of our visit and plan:    PLAN:  Increase Mounjaro to 10 mg weekly  Continue monitoring blood sugars at various times  Work on diet and exercise    Helpful Tips for Mounjaro:  Eat smaller portions of food than you are used to. You will get full faster due to how the medicine works.  Eat more slowly than usual and stop eating at the first sign of fullness.  Avoid eating meals late at night and then lying down. This can increase the likelihood of acid reflux (indigestion or heartburn).  Order a take-home container in restaurants when you place your order. Put away half your portion so you eat less. Or keep portions small by choosing from healthy salads, appetizers, and/or sides rather than ordering a main course. Or split and share a main course.  Limit high-fat and greasy foods. They can take longer to digest and cause more GI symptoms.  Limit spicy foods if you regularly have heartburn/indigestion.  Use arlin or peppermint tea to settle your stomach if you experience nausea.  Eat enough high-fiber foods - fruits, vegetables, and whole grains - to minimize constipation.     FOLLOW-UP:    Vale Franz on 8/27          Please call or Lashayhart if you have any questions.    Maria M MILLER  Diabetes Educator  [facility] [address] [address]  Phone: [phone]  Email: [email or web address]

## 2024-07-30 NOTE — PROGRESS NOTES
Diabetes Self-Management Education & Support    Luan Morgan is a 43 year old who presents today for education related to Type 2 Diabetes  Patient is being treated with:  diet and exercise, Mounjaro  He is accompanied by self    Year of diagnosis: 4 years   Referring provider:  Shahla Joaquin  Living Situation: with daughter, wife  Employment: did not ask    PATIENT CONCERNS RELATED TO DIABETES SELF MANAGEMENT: doing well on Mounjaro, 188 lb,  Feels a lot better, fasting glucoses and BP have improved      ASSESSMENT:    T2DM seen for Comprehensive Knowledge Assessment and Instruction at the request of Dr. Joaquin. Luan tells me that he was diagnosed with diabetes 4 years ago. A1C was 8.9 in April 2023,  up from 6.3 last year. Would recommend getting A1C re-checked since it has been 3 months.  Luan has met with PCP and was started on Mounjaro. Also restarted on statin. He tells me he feels great. Feels much better than when we met last. Now at 188 lb. Feels more energetic, going fishing and walks with his family. He will also be starting new job as .  He feels nausea after giving injection but feeling goes away. Otherwise, no other side effects.   His fasting glucoses have also improved. Due for 3rd dose of Mounjaro 7.5 mg weekly on next Sunday.     PLAN:  Increase Mounjaro to 10 mg weekly  Continue monitoring blood sugars at various times  Work on diet and exercise      FOLLOW-UP:    Vale Franz on 8/27    SUBJECTIVE / OBJECTIVE:  Diabetes education in the past 24mo: No  Diabetes type: Type 2  Disease course: Stable  How confident are you filling out medical forms by yourself:: Somewhat      Monitoring:  Blood Glucose Meter: Accu-chek  Times checking blood sugar at home (number): 2  Times checking blood sugar at home (per): Week  BG results: fasting  fasting glucose- 70-90 mg/dL , BP: 127/82, 124/77  CGM: none    Labs:  Lab Results   Component Value Date    A1C 8.9 04/22/2024     Lab Results  "  Component Value Date     04/22/2024     01/11/2022     Lab Results   Component Value Date     04/22/2024     04/15/2021     Direct Measure HDL   Date Value Ref Range Status   04/22/2024 40 >=40 mg/dL Final     GFR Estimate   Date Value Ref Range Status   04/22/2024 >90 >60 mL/min/1.73m2 Final   12/18/2020 >60 >60 mL/min/1.73m2 Final     Lab Results   Component Value Date    CR 0.86 04/22/2024     No results found for: \"MICROALBUMIN\"  No results found for: \"GADAB\"  No results found for: \"CPEPT\"      Diabetes Symptoms & Complications:  Diabetes Related Symptoms: Fatigue, Neuropathy, Polydipsia (increased thirst), Polyuria (increased urination), Visual change  Weight trend: Stable  Symptom course: Stable  Disease course: Stable         Taking Medications:  Diabetes Medication(s)       Incretin Mimetic Agents       tirzepatide (MOUNJARO) 2.5 MG/0.5ML pen Inject 2.5 mg Subcutaneous every 7 days     tirzepatide (MOUNJARO) 5 MG/0.5ML pen Inject 5 mg Subcutaneous every 7 days     tirzepatide (MOUNJARO) 7.5 MG/0.5ML pen Inject 7.5 mg subcutaneously every 7 days          Current Treatments: None    Problem Solving:             Patient is at risk of hypoglycemia?: NO  Hypoglycemia symptoms: none  Hospitalizations for hyper or hypoglycemia: No    Reducing Risks:  Has dilated eye exam at least once a year?: No  Sees dentist every 6 months?: Yes  Feet checked by healthcare provider in the last year?: No    Patient's most recent   Lab Results   Component Value Date    A1C 8.9 04/22/2024    A1C 6.3 04/24/2023      Patient's A1C goal: <7.0    Being Active:  Barrier to exercise: None  Walks on Saturdays - Sundays, for about 4 miles  Walks in the park with kids, with dog  Now going fishing    Nutrition:     Cultural/Episcopalian diet restrictions?: No  How many times a week on average do you eat food made away from home (restaurant/take-out)?: 3  Meals include: Lunch, Dinner, Afternoon Snack  Beverages: " Water, Juice, Soda, Coffee drinks  Lost appetite -, feels like throwing up a few hours after Mounjaro injection  Usually 2 meals a day    Meal Time Food   Breakfast  Cereal,   Banana      Lunch  White rice, pizza, bowl of salad     Dinner 5-6 pm Ramen noodles, kimchi, strawberries, blueberries  Fried chicken     Snack(s)  [place] and snickers     Beverage(s)  Oat milk and water       Self, wife, and daughter does food shopping and cooking  All help each other cook    Fruits and vegetables: almost everyday,       Healthy Coping:  Informal Support system:: Children, Family, Spouse  Sources of stress identified by patient: My health        Patient-stated goal written and given to Luan VAZQUEZ Morgan.  Verbalized and demonstrated understanding of instructions.     Time spent with patient at today's visit was 7 minutes.      Any diabetes medication initiation or dose changes were made via the CDCES Standing Orders per the patient's referring provider. A copy of this encounter was shared with the provider.

## 2024-07-30 NOTE — NURSING NOTE
Current patient location: [facility] [address] [place] [address]    Is the patient currently in the state of [place]? YES    Visit mode:VIDEO    If the visit is dropped, the patient can be reconnected by: VIDEO VISIT: Text to cell phone:   Telephone Information:   Mobile [phone]       Will anyone else be joining the visit? NO  (If patient encounters technical issues they should call [phone]    How would you like to obtain your AVS? MyChart    Are changes needed to the allergy or medication list? Yes pt has medication requested for removal     Are refills needed on medications prescribed by this physician? NO    Reason for visit: BORIS STYLES

## 2024-08-24 ENCOUNTER — HEALTH MAINTENANCE LETTER (OUTPATIENT)
Age: 44
End: 2024-08-24

## 2024-10-07 DIAGNOSIS — E11.65 TYPE 2 DIABETES MELLITUS WITH HYPERGLYCEMIA, WITHOUT LONG-TERM CURRENT USE OF INSULIN (H): Primary | ICD-10-CM

## 2024-10-07 NOTE — PROGRESS NOTES
Diagnoses and all orders for this visit:    Type 2 diabetes mellitus with hyperglycemia, without long-term current use of insulin (H)  -     tirzepatide (MOUNJARO) 12.5 MG/0.5ML pen; Inject 12.5 mg subcutaneously every 7 days for 28 days.

## 2024-11-02 ENCOUNTER — HEALTH MAINTENANCE LETTER (OUTPATIENT)
Age: 44
End: 2024-11-02

## 2024-11-05 ENCOUNTER — MYC MEDICAL ADVICE (OUTPATIENT)
Dept: FAMILY MEDICINE | Facility: CLINIC | Age: 44
End: 2024-11-05
Payer: COMMERCIAL

## 2024-11-05 DIAGNOSIS — E11.65 TYPE 2 DIABETES MELLITUS WITH HYPERGLYCEMIA, WITHOUT LONG-TERM CURRENT USE OF INSULIN (H): Primary | ICD-10-CM

## 2024-11-06 NOTE — TELEPHONE ENCOUNTER
Dr. Joaquin,    Please review [employer] message. Pended, sign if agree.    Yakov ABREU RN  [facility]

## 2024-11-07 ENCOUNTER — TELEPHONE (OUTPATIENT)
Dept: FAMILY MEDICINE | Facility: CLINIC | Age: 44
End: 2024-11-07
Payer: COMMERCIAL

## 2024-11-12 NOTE — TELEPHONE ENCOUNTER
PA Initiation    Medication: MOUNJARO 15 MG/0.5ML SC SOAJ  Insurance Company: [employer] - Phone [phone] Fax [phone]  Pharmacy Filling the Rx: [place], [place] - [address] [facility]  Filling Pharmacy Phone: [phone]  Filling Pharmacy Fax:    Start Date: 11/12/2024

## 2024-11-15 NOTE — TELEPHONE ENCOUNTER
Prior Authorization Approval    Medication: MOUNJARO 15 MG/0.5ML SC SOAJ  Authorization Effective Date: 11/14/2024  Authorization Expiration Date: 11/14/2027  Reference #: [id number]   Insurance Company: [employer] - Phone [phone] Fax [phone]  Which Pharmacy is filling the prescription: [place] [id number]  Pharmacy Notified: YES  Patient Notified: YES

## 2024-11-18 ENCOUNTER — LAB (OUTPATIENT)
Dept: LAB | Facility: CLINIC | Age: 44
End: 2024-11-18
Payer: COMMERCIAL

## 2024-11-18 DIAGNOSIS — E11.65 TYPE 2 DIABETES MELLITUS WITH HYPERGLYCEMIA, WITHOUT LONG-TERM CURRENT USE OF INSULIN (H): ICD-10-CM

## 2024-11-18 DIAGNOSIS — R25.3 FASCICULATIONS: ICD-10-CM

## 2024-11-18 DIAGNOSIS — E55.9 VITAMIN D DEFICIENCY: ICD-10-CM

## 2024-11-18 DIAGNOSIS — K76.0 NONALCOHOLIC HEPATOSTEATOSIS: Chronic | ICD-10-CM

## 2024-11-18 DIAGNOSIS — E11.65 TYPE 2 DIABETES MELLITUS WITH HYPERGLYCEMIA, WITHOUT LONG-TERM CURRENT USE OF INSULIN (H): Primary | ICD-10-CM

## 2024-11-18 DIAGNOSIS — E78.2 MIXED HYPERLIPIDEMIA: ICD-10-CM

## 2024-11-18 LAB
ALBUMIN SERPL BCG-MCNC: 4.6 G/DL (ref 3.5–5.2)
ALP SERPL-CCNC: 80 U/L (ref 40–150)
ALT SERPL W P-5'-P-CCNC: 30 U/L (ref 0–70)
ANION GAP SERPL CALCULATED.3IONS-SCNC: 12 MMOL/L (ref 7–15)
AST SERPL W P-5'-P-CCNC: 31 U/L (ref 0–45)
BILIRUB SERPL-MCNC: 0.6 MG/DL
BUN SERPL-MCNC: 19.5 MG/DL (ref 6–20)
CALCIUM SERPL-MCNC: 9.9 MG/DL (ref 8.8–10.4)
CHLORIDE SERPL-SCNC: 105 MMOL/L (ref 98–107)
CHOLEST SERPL-MCNC: 231 MG/DL
CREAT SERPL-MCNC: 0.96 MG/DL (ref 0.67–1.17)
EGFRCR SERPLBLD CKD-EPI 2021: >90 ML/MIN/1.73M2
EST. AVERAGE GLUCOSE BLD GHB EST-MCNC: 94 MG/DL
FASTING STATUS PATIENT QL REPORTED: YES
FASTING STATUS PATIENT QL REPORTED: YES
GLUCOSE SERPL-MCNC: 75 MG/DL (ref 70–99)
HBA1C MFR BLD: 4.9 % (ref 0–5.6)
HCO3 SERPL-SCNC: 25 MMOL/L (ref 22–29)
HDLC SERPL-MCNC: 48 MG/DL
IRON BINDING CAPACITY (ROCHE): 258 UG/DL (ref 240–430)
IRON SATN MFR SERPL: 31 % (ref 15–46)
IRON SERPL-MCNC: 81 UG/DL (ref 61–157)
LDLC SERPL CALC-MCNC: 166 MG/DL
NONHDLC SERPL-MCNC: 183 MG/DL
POTASSIUM SERPL-SCNC: 4.1 MMOL/L (ref 3.4–5.3)
PROT SERPL-MCNC: 7.8 G/DL (ref 6.4–8.3)
SODIUM SERPL-SCNC: 142 MMOL/L (ref 135–145)
TRIGL SERPL-MCNC: 85 MG/DL
TSH SERPL DL<=0.005 MIU/L-ACNC: 2.84 UIU/ML (ref 0.3–4.2)
VIT D+METAB SERPL-MCNC: 31 NG/ML (ref 20–50)

## 2024-11-18 PROCEDURE — 83550 IRON BINDING TEST: CPT

## 2024-11-18 PROCEDURE — 83036 HEMOGLOBIN GLYCOSYLATED A1C: CPT

## 2024-11-18 PROCEDURE — 83540 ASSAY OF IRON: CPT

## 2024-11-18 PROCEDURE — 80061 LIPID PANEL: CPT

## 2024-11-18 PROCEDURE — 36415 COLL VENOUS BLD VENIPUNCTURE: CPT

## 2024-11-18 PROCEDURE — 84443 ASSAY THYROID STIM HORMONE: CPT

## 2024-11-18 PROCEDURE — 80053 COMPREHEN METABOLIC PANEL: CPT

## 2024-11-18 PROCEDURE — 82306 VITAMIN D 25 HYDROXY: CPT

## 2024-11-18 NOTE — PROGRESS NOTES
Having fasciculations.    Wt Readings from Last 6 Encounters:   06/11/24 95.3 kg (210 lb)   05/20/24 100.7 kg (222 lb)   01/04/24 102.5 kg (226 lb)   01/04/24 102.7 kg (226 lb 8 oz)   05/01/23 98 kg (216 lb)   03/08/23 96.6 kg (213 lb)

## 2024-11-22 ENCOUNTER — MYC MEDICAL ADVICE (OUTPATIENT)
Dept: FAMILY MEDICINE | Facility: CLINIC | Age: 44
End: 2024-11-22
Payer: COMMERCIAL

## 2024-11-22 ENCOUNTER — TELEPHONE (OUTPATIENT)
Dept: FAMILY MEDICINE | Facility: CLINIC | Age: 44
End: 2024-11-22
Payer: COMMERCIAL

## 2024-11-22 DIAGNOSIS — E78.2 MIXED HYPERLIPIDEMIA: Primary | ICD-10-CM

## 2024-11-22 NOTE — RESULT ENCOUNTER NOTE
Cholesterol is still a bit higher than we'd like. Would he like to:  1) Keep working on nutrition on his own.  2) See a nutritionist to go over cholesterol.  3) Start a second cholesterol pill (continue his current one, too).    Either way, we should recheck a fasting cholesterol in 3 months.

## 2024-11-25 PROBLEM — E11.9 TYPE 2 DIABETES MELLITUS WITHOUT COMPLICATION, WITHOUT LONG-TERM CURRENT USE OF INSULIN (H): Status: ACTIVE | Noted: 2020-10-03

## 2024-11-25 NOTE — TELEPHONE ENCOUNTER
Lab on 11/18/2024   Component Date Value Ref Range Status    Estimated Average Glucose 11/18/2024 94  <117 mg/dL Final    Hemoglobin A1C 11/18/2024 4.9  0.0 - 5.6 % Final    Normal <5.7%   Prediabetes 5.7-6.4%    Diabetes 6.5% or higher     Note: Adopted from ADA consensus guidelines.    Sodium 11/18/2024 142  135 - 145 mmol/L Final    Potassium 11/18/2024 4.1  3.4 - 5.3 mmol/L Final    Carbon Dioxide (CO2) 11/18/2024 25  22 - 29 mmol/L Final    Anion Gap 11/18/2024 12  7 - 15 mmol/L Final    Urea Nitrogen 11/18/2024 19.5  6.0 - 20.0 mg/dL Final    Creatinine 11/18/2024 0.96  0.67 - 1.17 mg/dL Final    GFR Estimate 11/18/2024 >90  >60 mL/min/1.73m2 Final    eGFR calculated using 2021 CKD-EPI equation.    Calcium 11/18/2024 9.9  8.8 - 10.4 mg/dL Final    Reference intervals for this test were updated on 7/16/2024 to reflect our healthy population more accurately. There may be differences in the flagging of prior results with similar values performed with this method. Those prior results can be interpreted in the context of the updated reference intervals.    Chloride 11/18/2024 105  98 - 107 mmol/L Final    Glucose 11/18/2024 75  70 - 99 mg/dL Final    Alkaline Phosphatase 11/18/2024 80  40 - 150 U/L Final    AST 11/18/2024 31  0 - 45 U/L Final    ALT 11/18/2024 30  0 - 70 U/L Final    Protein Total 11/18/2024 7.8  6.4 - 8.3 g/dL Final    Albumin 11/18/2024 4.6  3.5 - 5.2 g/dL Final    Bilirubin Total 11/18/2024 0.6  <=1.2 mg/dL Final    Patient Fasting > 8hrs? 11/18/2024 Yes   Final    TSH 11/18/2024 2.84  0.30 - 4.20 uIU/mL Final    Cholesterol 11/18/2024 231 (H)  <200 mg/dL Final    Triglycerides 11/18/2024 85  <150 mg/dL Final    Direct Measure HDL 11/18/2024 48  >=40 mg/dL Final    LDL Cholesterol Calculated 11/18/2024 166 (H)  <100 mg/dL Final    Non HDL Cholesterol 11/18/2024 183 (H)  <130 mg/dL Final    Patient Fasting > 8hrs? 11/18/2024 Yes   Final    Vitamin D, Total (25-Hydroxy) 11/18/2024 31  20 - 50  ng/mL Final    optimum levels    Iron 11/18/2024 81  61 - 157 ug/dL Final    Iron Binding Capacity 11/18/2024 258  240 - 430 ug/dL Final    Iron Sat Index 11/18/2024 31  15 - 46 % Final     Wt Readings from Last 20 Encounters:   06/11/24 95.3 kg (210 lb)   05/20/24 100.7 kg (222 lb)   01/04/24 102.5 kg (226 lb)   01/04/24 102.7 kg (226 lb 8 oz)   05/01/23 98 kg (216 lb)   03/08/23 96.6 kg (213 lb)   12/04/20 89.4 kg (197 lb)   12/03/20 88.9 kg (196 lb)   11/10/20 88.9 kg (196 lb)   10/26/20 89.4 kg (197 lb)   10/15/20 93.9 kg (207 lb)   10/07/20 96.8 kg (213 lb 4.8 oz)   10/03/20 100.2 kg (221 lb)   10/31/19 97.4 kg (214 lb 11.2 oz)   09/30/18 94.6 kg (208 lb 8 oz)   07/08/18 93.3 kg (205 lb 11.2 oz)   06/04/18 95.3 kg (210 lb)   05/11/18 93.9 kg (207 lb)   03/05/18 92.5 kg (204 lb)

## 2024-11-26 NOTE — TELEPHONE ENCOUNTER
RN called and relayed clinician's message below. Pt verbalized understanding and in agreement with plan. Pt is interested in starting a second cholesterol med.     Routed to PCP for update    Yakov ABREU RN  [facility]

## 2024-12-03 RX ORDER — EZETIMIBE 10 MG/1
10 TABLET ORAL DAILY
Qty: 90 TABLET | Refills: 4 | Status: SHIPPED | OUTPATIENT
Start: 2024-12-03

## 2024-12-03 NOTE — TELEPHONE ENCOUNTER
Ezetimibe ordered. Take one pill daily. Recheck cholesterol in 6-8 weeks.     Luan was seen today for results.    Diagnoses and all orders for this visit:    Mixed hyperlipidemia  -     ezetimibe (ZETIA) 10 MG tablet; Take 1 tablet (10 mg) by mouth daily.

## 2024-12-04 NOTE — TELEPHONE ENCOUNTER
Left message on answering machine for patient to call clinic triage.   Please call back to review this provider message.  Thanks!  TERI Beavers

## 2025-03-01 ENCOUNTER — HEALTH MAINTENANCE LETTER (OUTPATIENT)
Age: 45
End: 2025-03-01

## 2025-04-24 DIAGNOSIS — E11.65 TYPE 2 DIABETES MELLITUS WITH HYPERGLYCEMIA, WITHOUT LONG-TERM CURRENT USE OF INSULIN (H): ICD-10-CM

## 2025-04-24 NOTE — PROGRESS NOTES
Diagnoses and all orders for this visit:    Type 2 diabetes mellitus with hyperglycemia, without long-term current use of insulin (H)  -     tirzepatide (MOUNJARO) 15 MG/0.5ML SOAJ auto-injector pen; Inject 0.5 mLs (15 mg) subcutaneously every 7 days.

## 2025-05-30 NOTE — PATIENT INSTRUCTIONS - HE
Patient Instructions by Pedro Stearns MD at 10/7/2020  8:50 AM     Author: Pedro Stearns MD Service: -- Author Type: Physician    Filed: 10/7/2020 12:03 PM Encounter Date: 10/7/2020 Status: Addendum    : Pedro Stearns MD (Physician)    Related Notes: Original Note by Pedro Stearns MD (Physician) filed at 10/7/2020 12:03 PM       -Your blood cell counts are normal.  -Tests are in process to assess your liver function and evaluate for pancreatic inflammation.  -X-rays of your abdomen are normal.  -CT of your abdomen / pelvis is normal except for fatty liver.  -Continue lansoprazole.  -Miralax does not appear to be necessary at this time.  -Return your fecal sample for H. pylori testing at your earliest convenience. Antibiotic therapy and increased lansoprazole dosing would be used to treat this infection if positive.  -Consider requesting referral to Gastroenterology if your symptoms persist.  -Follow up with Dr. Joaquin regarding your newly diagnosed type 2 diabetes mellitus and hyperlipidemia.  Patient Education     Uncertain Causes of Abdominal Pain (Male)  Based on your visit today, the exact cause of your abdominal pain is not clear. Your exam and tests do not suggest a dangerous cause at this time. However, the signs of a serious problem may take more time to appear. Although your evaluation was reassuring today, sometimes early in the course of many conditions, exam and lab tests can appear normal. Therefore, it is important for you to watch for any new symptoms or worsening of your condition.  It may not be obvious what caused your symptoms. Pay attention to things that do seem to make your symptoms worse or better and discuss this with your doctor when you follow up.  The evaluation of abdominal pain in the emergency department may only require an exam by the doctor or it may include blood, urine or imaging studies, depending on many factors. Sometimes exams and tests can identify a  cause but in many cases, a clear cause is not found. Further testing at follow up visits may help to suggest a clear diagnosis.  Home care    Rest as much as you can until your next exam.    Try to avoid any medicines (unless otherwise directed by your doctor), foods, activities, or other factors that may have contributed to your symptoms.    Try to eat foods that you know that you have tolerated well in the past. Certain diets may be recommended for some conditions that cause abdominal pain. However, since the cause of your symptoms may not be clear, discuss your diet more with your healthcare provider or specialist for further recommendations.     If you have diarrhea, it may help to avoid dairy (lactose) for the time being. A low fat, low fiber diet can also help.    Eating several small meals per day as opposed to 2 or 3 larger meals may help.    Avoid dehydration. Make sure to drink plenty of water. Other options include broth, soup, gelatin, sports drinks, or other clear liquids.    Watch closely for anything that may make your symptoms worse or better. Pay close attention to symptoms below that may mean your condition is getting worse.  Follow-up care  Follow up with your healthcare provider if your symptoms are not improving, or as advised. In some cases, you may need more testing.  When to seek medical advice  Call your healthcare provider right away if any of these occur:    Pain is becoming worse    You are unable to take your medicines or can't keep water down due to excessive vomiting    Swelling of the abdomen    Fever of 100.4 F (38 C) or higher, or as directed by your healthcare provider    Blood in vomit or bowel movements (dark red or black color)    Jaundice (yellow color of eyes and skin)    New onset of weakness, dizziness or fainting    New onset of chest, arm, back, neck or jaw pain  Date Last Reviewed: 12/30/2015 2000-2017 [employer] [address] [facility], [place]  [address]. All rights reserved. This information is not intended as a substitute for professional medical care. Always follow your healthcare professional's instructions.                 Pt A&O x 1-2, confused, enhanced room. Denies any s/s.

## 2025-06-15 ENCOUNTER — HEALTH MAINTENANCE LETTER (OUTPATIENT)
Age: 45
End: 2025-06-15